# Patient Record
Sex: FEMALE | Race: WHITE | NOT HISPANIC OR LATINO | URBAN - METROPOLITAN AREA
[De-identification: names, ages, dates, MRNs, and addresses within clinical notes are randomized per-mention and may not be internally consistent; named-entity substitution may affect disease eponyms.]

---

## 2018-05-09 ENCOUNTER — INPATIENT (INPATIENT)
Facility: HOSPITAL | Age: 83
LOS: 2 days | Discharge: HOME | End: 2018-05-12
Attending: HOSPITALIST | Admitting: HOSPITALIST
Payer: MEDICARE

## 2018-05-09 VITALS — WEIGHT: 171.08 LBS | HEIGHT: 63 IN

## 2018-05-09 LAB
ADD ON TEST-SPECIMEN IN LAB: SIGNIFICANT CHANGE UP
ALBUMIN SERPL ELPH-MCNC: 3.6 G/DL — SIGNIFICANT CHANGE UP (ref 3.3–5)
ALP SERPL-CCNC: 65 U/L — SIGNIFICANT CHANGE UP (ref 40–120)
ALT FLD-CCNC: 24 U/L — SIGNIFICANT CHANGE UP (ref 12–78)
ANION GAP SERPL CALC-SCNC: 10 MMOL/L — SIGNIFICANT CHANGE UP (ref 5–17)
AST SERPL-CCNC: 19 U/L — SIGNIFICANT CHANGE UP (ref 15–37)
BASOPHILS # BLD AUTO: 0.08 K/UL — SIGNIFICANT CHANGE UP (ref 0–0.2)
BASOPHILS NFR BLD AUTO: 0.8 % — SIGNIFICANT CHANGE UP (ref 0–2)
BILIRUB SERPL-MCNC: 0.2 MG/DL — SIGNIFICANT CHANGE UP (ref 0.2–1.2)
BUN SERPL-MCNC: 19 MG/DL — SIGNIFICANT CHANGE UP (ref 7–23)
CALCIUM SERPL-MCNC: 9 MG/DL — SIGNIFICANT CHANGE UP (ref 8.5–10.1)
CHLORIDE SERPL-SCNC: 112 MMOL/L — HIGH (ref 96–108)
CO2 SERPL-SCNC: 23 MMOL/L — SIGNIFICANT CHANGE UP (ref 22–31)
CREAT SERPL-MCNC: 0.77 MG/DL — SIGNIFICANT CHANGE UP (ref 0.5–1.3)
EOSINOPHIL # BLD AUTO: 0.15 K/UL — SIGNIFICANT CHANGE UP (ref 0–0.5)
EOSINOPHIL NFR BLD AUTO: 1.6 % — SIGNIFICANT CHANGE UP (ref 0–6)
ERYTHROCYTE [SEDIMENTATION RATE] IN BLOOD: 11 MM/HR — SIGNIFICANT CHANGE UP (ref 0–20)
GLUCOSE SERPL-MCNC: 106 MG/DL — HIGH (ref 70–99)
HCT VFR BLD CALC: 37.2 % — SIGNIFICANT CHANGE UP (ref 34.5–45)
HGB BLD-MCNC: 12.6 G/DL — SIGNIFICANT CHANGE UP (ref 11.5–15.5)
IMM GRANULOCYTES NFR BLD AUTO: 0.2 % — SIGNIFICANT CHANGE UP (ref 0–1.5)
INR BLD: 1.03 RATIO — SIGNIFICANT CHANGE UP (ref 0.88–1.16)
LACTATE SERPL-SCNC: 1.5 MMOL/L — SIGNIFICANT CHANGE UP (ref 0.7–2)
LYMPHOCYTES # BLD AUTO: 2.69 K/UL — SIGNIFICANT CHANGE UP (ref 1–3.3)
LYMPHOCYTES # BLD AUTO: 28.2 % — SIGNIFICANT CHANGE UP (ref 13–44)
MCHC RBC-ENTMCNC: 31.8 PG — SIGNIFICANT CHANGE UP (ref 27–34)
MCHC RBC-ENTMCNC: 33.9 GM/DL — SIGNIFICANT CHANGE UP (ref 32–36)
MCV RBC AUTO: 93.9 FL — SIGNIFICANT CHANGE UP (ref 80–100)
MONOCYTES # BLD AUTO: 0.86 K/UL — SIGNIFICANT CHANGE UP (ref 0–0.9)
MONOCYTES NFR BLD AUTO: 9 % — SIGNIFICANT CHANGE UP (ref 2–14)
NEUTROPHILS # BLD AUTO: 5.75 K/UL — SIGNIFICANT CHANGE UP (ref 1.8–7.4)
NEUTROPHILS NFR BLD AUTO: 60.2 % — SIGNIFICANT CHANGE UP (ref 43–77)
NRBC # BLD: 0 /100 WBCS — SIGNIFICANT CHANGE UP (ref 0–0)
PLATELET # BLD AUTO: 177 K/UL — SIGNIFICANT CHANGE UP (ref 150–400)
POTASSIUM SERPL-MCNC: 3.7 MMOL/L — SIGNIFICANT CHANGE UP (ref 3.5–5.3)
POTASSIUM SERPL-SCNC: 3.7 MMOL/L — SIGNIFICANT CHANGE UP (ref 3.5–5.3)
PROT SERPL-MCNC: 7.1 GM/DL — SIGNIFICANT CHANGE UP (ref 6–8.3)
PROTHROM AB SERPL-ACNC: 11.1 SEC — SIGNIFICANT CHANGE UP (ref 9.8–12.7)
RBC # BLD: 3.96 M/UL — SIGNIFICANT CHANGE UP (ref 3.8–5.2)
RBC # FLD: 13.8 % — SIGNIFICANT CHANGE UP (ref 10.3–14.5)
SODIUM SERPL-SCNC: 145 MMOL/L — SIGNIFICANT CHANGE UP (ref 135–145)
WBC # BLD: 9.55 K/UL — SIGNIFICANT CHANGE UP (ref 3.8–10.5)
WBC # FLD AUTO: 9.55 K/UL — SIGNIFICANT CHANGE UP (ref 3.8–10.5)

## 2018-05-09 PROCEDURE — 99285 EMERGENCY DEPT VISIT HI MDM: CPT

## 2018-05-09 PROCEDURE — 73590 X-RAY EXAM OF LOWER LEG: CPT | Mod: 26,RT

## 2018-05-09 RX ORDER — SODIUM CHLORIDE 9 MG/ML
1000 INJECTION INTRAMUSCULAR; INTRAVENOUS; SUBCUTANEOUS
Qty: 0 | Refills: 0 | Status: DISCONTINUED | OUTPATIENT
Start: 2018-05-09 | End: 2018-05-12

## 2018-05-09 RX ORDER — ATORVASTATIN CALCIUM 80 MG/1
10 TABLET, FILM COATED ORAL AT BEDTIME
Qty: 0 | Refills: 0 | Status: DISCONTINUED | OUTPATIENT
Start: 2018-05-09 | End: 2018-05-12

## 2018-05-09 RX ORDER — CEFEPIME 1 G/1
INJECTION, POWDER, FOR SOLUTION INTRAMUSCULAR; INTRAVENOUS
Qty: 0 | Refills: 0 | Status: DISCONTINUED | OUTPATIENT
Start: 2018-05-09 | End: 2018-05-10

## 2018-05-09 RX ORDER — DOCUSATE SODIUM 100 MG
100 CAPSULE ORAL THREE TIMES A DAY
Qty: 0 | Refills: 0 | Status: DISCONTINUED | OUTPATIENT
Start: 2018-05-09 | End: 2018-05-12

## 2018-05-09 RX ORDER — TAMOXIFEN CITRATE 20 MG/1
20 TABLET, FILM COATED ORAL AT BEDTIME
Qty: 0 | Refills: 0 | Status: DISCONTINUED | OUTPATIENT
Start: 2018-05-09 | End: 2018-05-12

## 2018-05-09 RX ORDER — ACETAMINOPHEN 500 MG
650 TABLET ORAL EVERY 6 HOURS
Qty: 0 | Refills: 0 | Status: DISCONTINUED | OUTPATIENT
Start: 2018-05-09 | End: 2018-05-12

## 2018-05-09 RX ORDER — MULTIVIT-MIN/FERROUS GLUCONATE 9 MG/15 ML
1 LIQUID (ML) ORAL DAILY
Qty: 0 | Refills: 0 | Status: DISCONTINUED | OUTPATIENT
Start: 2018-05-09 | End: 2018-05-12

## 2018-05-09 RX ORDER — LOSARTAN POTASSIUM 100 MG/1
100 TABLET, FILM COATED ORAL DAILY
Qty: 0 | Refills: 0 | Status: DISCONTINUED | OUTPATIENT
Start: 2018-05-09 | End: 2018-05-12

## 2018-05-09 RX ORDER — ONDANSETRON 8 MG/1
4 TABLET, FILM COATED ORAL EVERY 6 HOURS
Qty: 0 | Refills: 0 | Status: DISCONTINUED | OUTPATIENT
Start: 2018-05-09 | End: 2018-05-12

## 2018-05-09 RX ORDER — CEFEPIME 1 G/1
1000 INJECTION, POWDER, FOR SOLUTION INTRAMUSCULAR; INTRAVENOUS ONCE
Qty: 0 | Refills: 0 | Status: COMPLETED | OUTPATIENT
Start: 2018-05-09 | End: 2018-05-09

## 2018-05-09 RX ORDER — CEFEPIME 1 G/1
1000 INJECTION, POWDER, FOR SOLUTION INTRAMUSCULAR; INTRAVENOUS EVERY 12 HOURS
Qty: 0 | Refills: 0 | Status: DISCONTINUED | OUTPATIENT
Start: 2018-05-10 | End: 2018-05-10

## 2018-05-09 RX ORDER — LEVOTHYROXINE SODIUM 125 MCG
112 TABLET ORAL DAILY
Qty: 0 | Refills: 0 | Status: DISCONTINUED | OUTPATIENT
Start: 2018-05-09 | End: 2018-05-12

## 2018-05-09 RX ORDER — CALCIUM CARBONATE 500(1250)
1 TABLET ORAL
Qty: 0 | Refills: 0 | Status: DISCONTINUED | OUTPATIENT
Start: 2018-05-09 | End: 2018-05-12

## 2018-05-09 RX ORDER — VANCOMYCIN HCL 1 G
1000 VIAL (EA) INTRAVENOUS ONCE
Qty: 0 | Refills: 0 | Status: COMPLETED | OUTPATIENT
Start: 2018-05-09 | End: 2018-05-10

## 2018-05-09 RX ORDER — ALPRAZOLAM 0.25 MG
0.25 TABLET ORAL DAILY
Qty: 0 | Refills: 0 | Status: DISCONTINUED | OUTPATIENT
Start: 2018-05-09 | End: 2018-05-12

## 2018-05-09 RX ADMIN — CEFEPIME 100 MILLIGRAM(S): 1 INJECTION, POWDER, FOR SOLUTION INTRAMUSCULAR; INTRAVENOUS at 22:41

## 2018-05-09 RX ADMIN — TAMOXIFEN CITRATE 20 MILLIGRAM(S): 20 TABLET, FILM COATED ORAL at 23:08

## 2018-05-09 RX ADMIN — ATORVASTATIN CALCIUM 10 MILLIGRAM(S): 80 TABLET, FILM COATED ORAL at 22:41

## 2018-05-09 NOTE — ED STATDOCS - OBJECTIVE STATEMENT
85 y/o female presents to the ED sent by Dr. Hebert for cellulitis of right ankle. Pt states it is painful when ambulating and resting. Pt prescribed outpatient antibiotics, augmentin, kelfex and cipro, with no improvement. Denies fever/chills. Had similar episode 3 years ago, admitted to hospital and received infusion.

## 2018-05-09 NOTE — ED STATDOCS - CARE PLAN
Principal Discharge DX:	Cellulitis of leg without foot, right  Goal:	admit  Assessment and plan of treatment:	cellulitis

## 2018-05-09 NOTE — H&P ADULT - ASSESSMENT
86 y.o. female with PMH Grave's disease s/p BARBER now hypothyroidism, HTN, HLD, Left invasive ductal breast ca s/p lumpectomy s/p chemo/xrt 2008, BCC skin presents with RLE redness, pain, and swelling. Pt reports symptoms for the past 3 weeks. Denies trauma and denies infect bites. Pt went to PCP and was given Keflex, Cipro and Augmentin at different times. Despite ABx, pt continues to complain of redness, pain, and swelling of RLE/shin region. She denies fever, chills, CP, SOB, abd pain, dysuria. Pt worse with ambulation and occurs even at rest.    #RLE cellulitis  #dehydration  -admit to med surg  -check ESR, CRP  -f/u blood cultures  -iv hydration  -vanco, cefepime  -outpt US: negative for DVT (report in chart)  -ID consult  -obtain xray RLE    #hypothyroidism  -cont synthroid 112mcg daily    #HTN, HLD  -cont home meds    #L breast ca s/p lumpectomy, chemo, xrt  -cont tamoxifen    #DVT ppx  -SCDs

## 2018-05-09 NOTE — H&P ADULT - NSHPPHYSICALEXAM_GEN_ALL_CORE
Vital Signs Last 24 Hrs  T(C): 36.8 (09 May 2018 16:33), Max: 36.8 (09 May 2018 16:33)  T(F): 98.2 (09 May 2018 16:33), Max: 98.2 (09 May 2018 16:33)  HR: 93 (09 May 2018 16:33) (93 - 93)  BP: 143/71 (09 May 2018 16:33) (143/71 - 143/71)  BP(mean): --  RR: --  SpO2: 95% (09 May 2018 16:33) (95% - 95%)    GEN: appears comfortable  Neuro: AAOx3, nonfocal  HEENT: NC/AT, EOMI  Neck: no thyroidmegaly, no JVD  Cardiovascular: S1S2 present, regular rhythm, no murmur  Respiratory: breath sounds normal bilaterally, no wheezing, no rales, no rhonchi  Gastrointestinal: bowel sounds normal, soft, no abdominal tenderness  Musculoskeletal: +tenderness RLE at shin  Extremities: R ankle pitting edema  Skin: +RLE erythema and swelling

## 2018-05-09 NOTE — ED STATDOCS - ATTENDING CONTRIBUTION TO CARE
I, Roxana Laguna MD,  performed the initial face to face bedside interview with this patient regarding history of present illness, review of symptoms and relevant past medical, social and family history.  I completed an independent physical examination.  I was the initial provider who evaluated this patient. I have signed out the follow up of any pending tests (i.e. labs, radiological studies) to the ACP.  I have communicated the patient’s plan of care and disposition with the ACP.  The history, relevant review of systems, past medical and surgical history, medical decision making, and physical examination was documented by the scribe in my presence and I attest to the accuracy of the documentation.

## 2018-05-09 NOTE — PATIENT PROFILE ADULT. - NS TRANSFER PATIENT BELONGINGS
yellow metal ring to left ring finger, wallet/Jewelry/Money (specify)/Clothing/Cell Phone/PDA (specify)

## 2018-05-09 NOTE — ED ADULT NURSE NOTE - OBJECTIVE STATEMENT
Pt alert and oriented x3. Pt presents with cellulitis of the RLE. Pt sent in for IV antibiotics. Pt states pain while ambulating. Denies fever or chills.

## 2018-05-09 NOTE — H&P ADULT - HISTORY OF PRESENT ILLNESS
86 y.o. female with PMH Grave's disease s/p BARBER now hypothyroidism, HTN, HLD, Left invasive ductal breast ca s/p lumpectomy s/p chemo/xrt 2008, BCC skin presents with RLE redness, pain, and swelling. Pt reports symptoms for the past 3 weeks. Denies trauma and denies infect bites. Pt went to PCP and was given Keflex, Cipro and Augmentin at different times. Despite ABx, pt continues to complain of redness, pain, and swelling of RLE/shin region. She denies fever, chills, CP, SOB, abd pain, dysuria. Pt worse with ambulation and occurs even at rest.    PMH: as above  PSH: as above and b/l cataract, tonsillectomy, lumpectomy, D&C  Social Hx: denies tobacco, EtOH, drugs  Family Hx: Mother-colon ca; daughter: breast ca; sister-colon ca  ROS: per HPI

## 2018-05-09 NOTE — ED ADULT TRIAGE NOTE - CHIEF COMPLAINT QUOTE
sent by dr mckinley for rle cellulitis patient failed outpatinet antibiotics, augmentin, kelfex and cipro.  denies fever/chills

## 2018-05-09 NOTE — ED STATDOCS - PROGRESS NOTE DETAILS
pt seen with ER attending for admission for cellulitis to left lower leg unresponsive to 3 dosages of oral antibiotics

## 2018-05-09 NOTE — ED STATDOCS - SKIN, MLM
skin normal color for race, warm, dry and intact. small patch of mildly reddened skin warm to touch medial distal R lower leg

## 2018-05-10 LAB
ANION GAP SERPL CALC-SCNC: 10 MMOL/L — SIGNIFICANT CHANGE UP (ref 5–17)
BASOPHILS # BLD AUTO: 0.07 K/UL — SIGNIFICANT CHANGE UP (ref 0–0.2)
BASOPHILS NFR BLD AUTO: 0.8 % — SIGNIFICANT CHANGE UP (ref 0–2)
BUN SERPL-MCNC: 15 MG/DL — SIGNIFICANT CHANGE UP (ref 7–23)
CALCIUM SERPL-MCNC: 8.3 MG/DL — LOW (ref 8.5–10.1)
CHLORIDE SERPL-SCNC: 113 MMOL/L — HIGH (ref 96–108)
CO2 SERPL-SCNC: 22 MMOL/L — SIGNIFICANT CHANGE UP (ref 22–31)
CREAT SERPL-MCNC: 0.66 MG/DL — SIGNIFICANT CHANGE UP (ref 0.5–1.3)
CRP SERPL-MCNC: 0.2 MG/DL — SIGNIFICANT CHANGE UP (ref 0–0.4)
EOSINOPHIL # BLD AUTO: 0.21 K/UL — SIGNIFICANT CHANGE UP (ref 0–0.5)
EOSINOPHIL NFR BLD AUTO: 2.4 % — SIGNIFICANT CHANGE UP (ref 0–6)
GLUCOSE SERPL-MCNC: 94 MG/DL — SIGNIFICANT CHANGE UP (ref 70–99)
HCT VFR BLD CALC: 34.1 % — LOW (ref 34.5–45)
HGB BLD-MCNC: 11.6 G/DL — SIGNIFICANT CHANGE UP (ref 11.5–15.5)
IMM GRANULOCYTES NFR BLD AUTO: 0.5 % — SIGNIFICANT CHANGE UP (ref 0–1.5)
LYMPHOCYTES # BLD AUTO: 2.69 K/UL — SIGNIFICANT CHANGE UP (ref 1–3.3)
LYMPHOCYTES # BLD AUTO: 30.9 % — SIGNIFICANT CHANGE UP (ref 13–44)
MCHC RBC-ENTMCNC: 31.8 PG — SIGNIFICANT CHANGE UP (ref 27–34)
MCHC RBC-ENTMCNC: 34 GM/DL — SIGNIFICANT CHANGE UP (ref 32–36)
MCV RBC AUTO: 93.4 FL — SIGNIFICANT CHANGE UP (ref 80–100)
MONOCYTES # BLD AUTO: 0.79 K/UL — SIGNIFICANT CHANGE UP (ref 0–0.9)
MONOCYTES NFR BLD AUTO: 9.1 % — SIGNIFICANT CHANGE UP (ref 2–14)
NEUTROPHILS # BLD AUTO: 4.9 K/UL — SIGNIFICANT CHANGE UP (ref 1.8–7.4)
NEUTROPHILS NFR BLD AUTO: 56.3 % — SIGNIFICANT CHANGE UP (ref 43–77)
NRBC # BLD: 0 /100 WBCS — SIGNIFICANT CHANGE UP (ref 0–0)
PLATELET # BLD AUTO: 149 K/UL — LOW (ref 150–400)
POTASSIUM SERPL-MCNC: 3.6 MMOL/L — SIGNIFICANT CHANGE UP (ref 3.5–5.3)
POTASSIUM SERPL-SCNC: 3.6 MMOL/L — SIGNIFICANT CHANGE UP (ref 3.5–5.3)
RBC # BLD: 3.65 M/UL — LOW (ref 3.8–5.2)
RBC # FLD: 13.7 % — SIGNIFICANT CHANGE UP (ref 10.3–14.5)
SODIUM SERPL-SCNC: 145 MMOL/L — SIGNIFICANT CHANGE UP (ref 135–145)
WBC # BLD: 8.7 K/UL — SIGNIFICANT CHANGE UP (ref 3.8–10.5)
WBC # FLD AUTO: 8.7 K/UL — SIGNIFICANT CHANGE UP (ref 3.8–10.5)

## 2018-05-10 RX ORDER — CEFAZOLIN SODIUM 1 G
1000 VIAL (EA) INJECTION EVERY 8 HOURS
Qty: 0 | Refills: 0 | Status: DISCONTINUED | OUTPATIENT
Start: 2018-05-10 | End: 2018-05-12

## 2018-05-10 RX ORDER — CEFEPIME 1 G/1
1000 INJECTION, POWDER, FOR SOLUTION INTRAMUSCULAR; INTRAVENOUS EVERY 12 HOURS
Qty: 0 | Refills: 0 | Status: DISCONTINUED | OUTPATIENT
Start: 2018-05-10 | End: 2018-05-10

## 2018-05-10 RX ADMIN — LOSARTAN POTASSIUM 100 MILLIGRAM(S): 100 TABLET, FILM COATED ORAL at 06:25

## 2018-05-10 RX ADMIN — CEFEPIME 1000 MILLIGRAM(S): 1 INJECTION, POWDER, FOR SOLUTION INTRAMUSCULAR; INTRAVENOUS at 06:25

## 2018-05-10 RX ADMIN — Medication 1 TABLET(S): at 17:16

## 2018-05-10 RX ADMIN — Medication 250 MILLIGRAM(S): at 00:27

## 2018-05-10 RX ADMIN — Medication 100 MILLIGRAM(S): at 21:02

## 2018-05-10 RX ADMIN — Medication 112 MICROGRAM(S): at 06:19

## 2018-05-10 RX ADMIN — Medication 1 TABLET(S): at 06:19

## 2018-05-10 RX ADMIN — Medication 1 TABLET(S): at 12:53

## 2018-05-10 RX ADMIN — TAMOXIFEN CITRATE 20 MILLIGRAM(S): 20 TABLET, FILM COATED ORAL at 21:02

## 2018-05-10 RX ADMIN — ATORVASTATIN CALCIUM 10 MILLIGRAM(S): 80 TABLET, FILM COATED ORAL at 21:02

## 2018-05-10 RX ADMIN — Medication 100 MILLIGRAM(S): at 15:41

## 2018-05-10 RX ADMIN — SODIUM CHLORIDE 75 MILLILITER(S): 9 INJECTION INTRAMUSCULAR; INTRAVENOUS; SUBCUTANEOUS at 00:28

## 2018-05-10 NOTE — PROGRESS NOTE ADULT - ATTENDING COMMENTS
Patient seen and examined with Alma Keys, Darryn Robertson, Rodolfo Saldivar, Kali Villa and PA Dawn Paez on the Family Medicine Teaching Service.  Agree with history, physical, labs and plan which were reviewed in detail.

## 2018-05-10 NOTE — PROGRESS NOTE ADULT - SUBJECTIVE AND OBJECTIVE BOX
86F w/PMH of L invasive ductal breast ca s/p lumpectomy s/p chemo/xrt 2008, HTN, HLD, hypothyroidism due to Grave's disease S/P BARBER presents w/ a 3 week hx of R ankle pain, swelling, and erythema. Pt denies any trauma, insect bites, scratching of the area. Pt was seen by PCP and diagnosed w/ cellulitis. Pt failed Keflex, Cipro and Augmentin as outpatient. Pt denies fever/chills, CP, SOB, abd pain, dysuria. In the ED, pt received Vanco and Cefepime.     5/10/18: Pt seen and evaluated at bedside. Pt reports the pain, swelling, erythema improved. ID started pt on Ancef 1g q8. Pt denies fevers/chills. Pt afebrile, WBC 8.7.    REVIEW OF SYSTEMS:  CONSTITUTIONAL: No weakness, fevers or chills  EYES/ENT: No visual changes;  No vertigo or throat pain   NECK: No pain or stiffness  RESPIRATORY: No cough, wheezing, hemoptysis; No shortness of breath  CARDIOVASCULAR: No chest pain or palpitations  GASTROINTESTINAL: No abdominal or epigastric pain. No nausea, vomiting, or hematemesis; No diarrhea or constipation. No melena or hematochezia.  GENITOURINARY: No dysuria, frequency or hematuria  NEUROLOGICAL: No numbness or weakness  SKIN:  R ankle pain, swelling, and erythema  All other review of systems is negative unless indicated above    PHYSICAL EXAM:  Vital Signs Last 24 Hrs  T(C): 37.3 (10 May 2018 12:00), Max: 37.3 (10 May 2018 12:00)  T(F): 99.2 (10 May 2018 12:00), Max: 99.2 (10 May 2018 12:00)  HR: 80 (10 May 2018 12:00) (79 - 93)  BP: 137/57 (10 May 2018 12:00) (137/57 - 169/81)  BP(mean): --  RR: 18 (10 May 2018 12:00) (18 - 19)  SpO2: 96% (10 May 2018 12:00) (95% - 100%)    Constitutional: Pt sitting in chair, awake and alert, NAD  HEENT: EOMI, normal hearing, moist mucous membranes  Neck: Soft and supple, no JVD  Respiratory: CTABL, No wheezing, rales or rhonchi  Cardiovascular: S1S2+, RRR, no M/G/R  Gastrointestinal: BS+, soft, NT/ND, no guarding, no rebound  Extremities: No peripheral edema  Vascular: 2+ peripheral pulses  Neurological: AAOx3, no focal deficits  Musculoskeletal: 5/5 strength b/l upper and lower extremities  Skin: R ankle pain, swelling, and erythema    MEDICATIONS:  MEDICATIONS  (STANDING):  atorvastatin 10 milliGRAM(s) Oral at bedtime  calcium carbonate 500 mG (Tums) Chewable 1 Tablet(s) Chew two times a day  ceFAZolin   IVPB 1000 milliGRAM(s) IV Intermittent every 8 hours  levothyroxine 112 MICROGram(s) Oral daily  losartan 100 milliGRAM(s) Oral daily  multivitamin/minerals 1 Tablet(s) Oral daily  sodium chloride 0.9%. 1000 milliLiter(s) (75 mL/Hr) IV Continuous <Continuous>  tamoxifen 20 milliGRAM(s) Oral at bedtime      LABS: All Labs Reviewed:                        11.6   8.70  )-----------( 149      ( 10 May 2018 07:16 )             34.1     05-10    145  |  113<H>  |  15  ----------------------------<  94  3.6   |  22  |  0.66    Ca    8.3<L>      10 May 2018 07:16    TPro  7.1  /  Alb  3.6  /  TBili  0.2  /  DBili  x   /  AST  19  /  ALT  24  /  AlkPhos  65  05-09    PT/INR - ( 09 May 2018 17:52 )   PT: 11.1 sec;   INR: 1.03 ratio         Blood Culture:   I&O's Summary    10 May 2018 07:01  -  10 May 2018 14:28  --------------------------------------------------------  IN: 360 mL / OUT: 0 mL / NET: 360 mL

## 2018-05-10 NOTE — PROGRESS NOTE ADULT - ASSESSMENT
86F w/PMH of L invasive ductal breast ca s/p lumpectomy s/p chemo/xrt 2008, HTN, HLD, hypothyroidism due to Grave's disease S/P BARBER presents w/ a 3 week hx of R ankle pain, swelling, and erythema 2/2 cellulitis that failed outpatient Keflex, Cipro and Augmentin therapy.     #RLE cellulitis  - F/U blood cultures  - IV hydration 75mL/hr x24 hours  - ESR, CRP WNL  - S/P vanco, cefepime  - ID consult appreciated: changed to Ancef 1g q8  - Foot xray negative for osteomyelitis    #Hypothyroidism  - Continue synthroid 112mcg qd    #HTN  - Continue home meds losartan    #HLD  - Continue atorvastatin    #Hx of breast cancer  - Stable  - Continue Tamoxifen    #DVT ppx  - SCDs

## 2018-05-10 NOTE — CONSULT NOTE ADULT - SUBJECTIVE AND OBJECTIVE BOX
Patient is a 86y old  Female who presents with a chief complaint of RLE cellulitis (09 May 2018 20:54)    HPI:  86 y.o. female with PMH Grave's disease s/p BARBER now hypothyroidism, HTN, HLD, Left invasive ductal breast ca s/p lumpectomy s/p chemo/xrt , BCC skin presents with RLE redness, pain, and swelling on . Pt reports symptoms for the past 3 weeks. Denies trauma and denies infect bites. Pt went to PCP and was given Keflex, Cipro and Augmentin at different times. Despite ABx, pt continues to complain of redness, pain, and swelling of RLE/shin region. She denies fever, chills, CP, SOB, abd pain, dysuria. Pt worse with ambulation and occurs even at rest. Here, afebrile, wbc ct nl, was given IV vanco/cefepime.     PMH: as above  PSH: as above and b/l cataract, tonsillectomy, lumpectomy, D&C    Meds: per reconciliation sheet, noted below  MEDICATIONS  (STANDING):  atorvastatin 10 milliGRAM(s) Oral at bedtime  calcium carbonate 500 mG (Tums) Chewable 1 Tablet(s) Chew two times a day  ceFAZolin   IVPB 1000 milliGRAM(s) IV Intermittent every 8 hours  levothyroxine 112 MICROGram(s) Oral daily  losartan 100 milliGRAM(s) Oral daily  multivitamin/minerals 1 Tablet(s) Oral daily  sodium chloride 0.9%. 1000 milliLiter(s) (75 mL/Hr) IV Continuous <Continuous>  tamoxifen 20 milliGRAM(s) Oral at bedtime      Allergies    No Known Allergies    Intolerances      Social: no smoking, no alcohol, no illegal drugs; no recent travel, no exposure to TB  FAMILY HISTORY:     no history of premature cardiovascular disease in first degree relatives    ROS: the patient denies fever, no chills, no HA, no dizziness, no sore throat, no blurry vision, no CP, no palpitations, no SOB, no cough, no abdominal pain, no diarrhea, no N/V, no dysuria, no leg pain, no claudication, no rectal pain or bleeding, no night sweats  All other systems reviewed and are negative    Vital Signs Last 24 Hrs  T(C): 37.3 (10 May 2018 12:00), Max: 37.3 (10 May 2018 12:00)  T(F): 99.2 (10 May 2018 12:00), Max: 99.2 (10 May 2018 12:00)  HR: 80 (10 May 2018 12:00) (79 - 93)  BP: 137/57 (10 May 2018 12:00) (137/57 - 169/81)  BP(mean): --  RR: 18 (10 May 2018 12:00) (18 - 19)  SpO2: 96% (10 May 2018 12:00) (95% - 100%)  Daily Height in cm: 160.02 (09 May 2018 16:15)    Daily Weight in k (10 May 2018 00:13)    PE:    Constitutional: frail looking  HEENT: NC/AT, EOMI, PERRLA, conjunctivae clear; ears and nose atraumatic; pharynx benign  Neck: supple; thyroid not palpable  Back: no tenderness  Respiratory: respiratory effort normal; clear to auscultation  Cardiovascular: S1S2 regular, no murmurs  Abdomen: soft, not tender, not distended, positive BS; liver and spleen WNL  Genitourinary: no suprapubic tenderness  Lymphatic: no LN palpable  Musculoskeletal: no muscle tenderness, no joint swelling or tenderness  Extremities: no pedal edema  Neurological/ Psychiatric: moving all extremities  Skin: RLE resolving erythema    Labs: all available labs reviewed                        11.6   8.70  )-----------( 149      ( 10 May 2018 07:16 )             34.1     05-10    145  |  113<H>  |  15  ----------------------------<  94  3.6   |  22  |  0.66    Ca    8.3<L>      10 May 2018 07:16    TPro  7.1  /  Alb  3.6  /  TBili  0.2  /  DBili  x   /  AST  19  /  ALT  24  /  AlkPhos  65  05-09     LIVER FUNCTIONS - ( 09 May 2018 17:52 )  Alb: 3.6 g/dL / Pro: 7.1 gm/dL / ALK PHOS: 65 U/L / ALT: 24 U/L / AST: 19 U/L / GGT: x                 Radiology: all available radiological tests reviewed      EXAM:  CR TIB-FIB RIGHT                            PROCEDURE DATE:  2018          INTERPRETATION:  INDICATION: 86-year-old pain. Swelling and tenderness in   the right leg.    3 views of right tibia and fibula. There are no prior exams available for   comparison.    FINDINGS/  IMPRESSION:    There is no acute displaced fracture or dislocation. There is osteopenia.   There is no gross soft tissue swelling.     If symptoms persist, follow-up exam is suggested.    < end of copied text >    Advanced directives addressed: full resuscitation

## 2018-05-10 NOTE — CONSULT NOTE ADULT - ASSESSMENT
86 y.o. female with PMH Grave's disease s/p BARBER now hypothyroidism, HTN, HLD, Left invasive ductal breast ca s/p lumpectomy s/p chemo/xrt 2008, BCC skin presents with RLE redness, pain, and swelling on 5/9. Pt reports symptoms for the past 3 weeks. Denies trauma and denies infect bites. Pt went to PCP and was given Keflex, Cipro and Augmentin at different times. Despite ABx, pt continues to complain of redness, pain, and swelling of RLE/shin region. She denies fever, chills, CP, SOB, abd pain, dysuria. Pt worse with ambulation and occurs even at rest. Here, afebrile, wbc ct nl, was given IV vanco/cefepime.     1. RLE cellulitis  - improving  - likely strep related  - s/p vanco/cefepime  - switch to ancef 1gmq8h  - once improved in next 24-48 hours, switch to oral keflex [id number] to complete 5 day course  - f/u cbc  - monitor temps  - tolerating abx well so far; no side effects noted  -reason for abx use and side effects reviewed with patient  - supportive care    2. other issues - care per medicine

## 2018-05-11 ENCOUNTER — TRANSCRIPTION ENCOUNTER (OUTPATIENT)
Age: 83
End: 2018-05-11

## 2018-05-11 RX ORDER — CEPHALEXIN 500 MG
1 CAPSULE ORAL
Qty: 21 | Refills: 0 | OUTPATIENT
Start: 2018-05-11 | End: 2018-05-17

## 2018-05-11 RX ORDER — CEPHALEXIN 500 MG
1 CAPSULE ORAL
Qty: 15 | Refills: 0 | OUTPATIENT
Start: 2018-05-11 | End: 2018-05-15

## 2018-05-11 RX ORDER — CEPHALEXIN 500 MG
1 CAPSULE ORAL
Qty: 9 | Refills: 0 | OUTPATIENT
Start: 2018-05-11 | End: 2018-05-13

## 2018-05-11 RX ADMIN — LOSARTAN POTASSIUM 100 MILLIGRAM(S): 100 TABLET, FILM COATED ORAL at 05:19

## 2018-05-11 RX ADMIN — Medication 100 MILLIGRAM(S): at 21:29

## 2018-05-11 RX ADMIN — Medication 1 TABLET(S): at 05:19

## 2018-05-11 RX ADMIN — ATORVASTATIN CALCIUM 10 MILLIGRAM(S): 80 TABLET, FILM COATED ORAL at 21:29

## 2018-05-11 RX ADMIN — Medication 100 MILLIGRAM(S): at 13:18

## 2018-05-11 RX ADMIN — Medication 1 TABLET(S): at 12:06

## 2018-05-11 RX ADMIN — Medication 1 TABLET(S): at 17:35

## 2018-05-11 RX ADMIN — Medication 112 MICROGRAM(S): at 05:19

## 2018-05-11 RX ADMIN — Medication 100 MILLIGRAM(S): at 05:19

## 2018-05-11 RX ADMIN — TAMOXIFEN CITRATE 20 MILLIGRAM(S): 20 TABLET, FILM COATED ORAL at 21:29

## 2018-05-11 NOTE — DISCHARGE NOTE ADULT - CARE PLAN
Principal Discharge DX:	Cellulitis of leg without foot, right  Goal:	To not have worsening swelling, pain, warmth of right ankle Principal Discharge DX:	Cellulitis of leg without foot, right  Goal:	To not have worsening swelling, pain, warmth of right ankle  Assessment and plan of treatment:	- Start taking Keflex 500mg every 8 hours for the next three days to complete your antibiotic course  - Follow up with your primary physician within the next week  - Call your primary physician or return to the ED if you experience worsening right ankle/foot pain, fevers, chills, nausea or vomiting Principal Discharge DX:	Cellulitis of leg without foot, right  Goal:	To not have worsening swelling, pain, warmth of right ankle  Assessment and plan of treatment:	- Start taking Keflex 500mg every 8 hours for the next five days to complete your antibiotic course  - Follow up with your primary physician within the next week  - Call your primary physician or return to the ED if you experience worsening right ankle/foot pain, fevers, chills, nausea or vomiting  Secondary Diagnosis:	Hypothyroid  Assessment and plan of treatment:	- Continue taking synthroid 112mcg daily  Secondary Diagnosis:	HTN (hypertension)  Assessment and plan of treatment:	- Continue taking home Losartan  Secondary Diagnosis:	HLD (hyperlipidemia)  Assessment and plan of treatment:	- Continue taking atorvastatin  Secondary Diagnosis:	History of breast cancer  Assessment and plan of treatment:	- Continue taking Tamoxifen Principal Discharge DX:	Cellulitis of leg without foot, right  Goal:	To not have worsening swelling, pain, warmth of right ankle  Assessment and plan of treatment:	- Start taking Keflex 500mg every 8 hours for the next seven days to complete your antibiotic course  - Follow up with your primary physician within the next week  - Call your primary physician or return to the ED if you experience worsening right ankle/foot pain, fevers, chills, nausea or vomiting  Secondary Diagnosis:	Hypothyroid  Assessment and plan of treatment:	- Continue taking synthroid 112mcg daily  Secondary Diagnosis:	HTN (hypertension)  Assessment and plan of treatment:	- Continue taking home Losartan  Secondary Diagnosis:	HLD (hyperlipidemia)  Assessment and plan of treatment:	- Continue taking atorvastatin  Secondary Diagnosis:	History of breast cancer  Assessment and plan of treatment:	- Continue taking Tamoxifen

## 2018-05-11 NOTE — DISCHARGE NOTE ADULT - CARE PROVIDER_API CALL
Mahnaz Hebert), Gastroenterology; Internal Medicine  [address]  [place] [address]  Phone: [phone]  Fax: [phone]    Nazia Page (PA-C), Physician Assistant Services  [address]  [place] [address]  Phone: [phone]  Fax: [phone]

## 2018-05-11 NOTE — DISCHARGE NOTE ADULT - HOSPITAL COURSE
86F w/PMH of L invasive ductal breast ca s/p lumpectomy s/p chemo/xrt 2008, HTN, HLD, hypothyroidism due to Grave's disease S/P BARBER presents w/ a 3 week hx of R ankle pain, swelling, and erythema. Pt denies any trauma, insect bites, scratching of the area. Pt was seen by PCP and diagnosed w/ cellulitis. Pt failed Keflex, Cipro and Augmentin as outpatient. Pt denies fever/chills, CP, SOB, abd pain, dysuria. In the ED, pt received Vanco and Cefepime. ID started pt on Ancef 1g q8. Pain, swelling, erythema improved. Pt denies fevers/chills. Pt afebrile, WBC 8.7 on D/C. Pt sent home w/ 3 days of Keflex 500mg q8 to complete 5 day antibiotic course. 86F w/PMH of L invasive ductal breast ca s/p lumpectomy s/p chemo/xrt 2008, HTN, HLD, hypothyroidism due to Grave's disease S/P BARBER presents w/ a 3 week hx of R ankle pain, swelling, and erythema. Pt denies any trauma, insect bites, scratching of the area. Pt was seen by PCP and diagnosed w/ cellulitis. Pt failed Keflex, Cipro and Augmentin as outpatient. Pt denies fever/chills, CP, SOB, abd pain, dysuria. In the ED, pt received Vanco and Cefepime. ID started pt on Ancef 1g q8. Pain, swelling, erythema improved. Pt denies fevers/chills. Pt afebrile, WBC WNL on D/C. Pt sent home w/ 5 days of Keflex 500mg q8 to complete antibiotic course. 86F w/PMH of L invasive ductal breast ca s/p lumpectomy s/p chemo/xrt 2008, HTN, HLD, hypothyroidism due to Grave's disease S/P BARBER presents w/ a 3 week hx of R ankle pain, swelling, and erythema. Pt denies any trauma, insect bites, scratching of the area. Pt was seen by PCP and diagnosed w/ cellulitis. Pt failed Keflex, Cipro and Augmentin as outpatient. Pt denies fever/chills, CP, SOB, abd pain, dysuria. In the ED, pt received Vanco and Cefepime. ID started pt on Ancef 1g q8. Pain, swelling, erythema improved. Pt denies fevers/chills. Pt afebrile, WBC WNL on D/C. Pt sent home w/ 5 days of Keflex 500mg q8 to complete antibiotic course.    PE  Vital Signs Last 24 Hrs  T(C): 36.8 (11 May 2018 05:22), Max: 36.8 (11 May 2018 05:22)  T(F): 98.2 (11 May 2018 05:22), Max: 98.2 (11 May 2018 05:22)  HR: 74 (11 May 2018 05:22) (74 - 81)  BP: 135/67 (11 May 2018 05:22) (129/78 - 135/67)  BP(mean): --  RR: 18 (11 May 2018 05:22) (18 - 18)  SpO2: 94% (11 May 2018 05:22) (94% - 98%)    Constitutional: Pt sitting in chair, awake and alert, NAD  HEENT: EOMI, normal hearing, moist mucous membranes  Neck: Soft and supple, no JVD  Respiratory: CTABL, No wheezing, rales or rhonchi  Cardiovascular: S1S2+, RRR, no M/G/R  Gastrointestinal: BS+, soft, NT/ND, no guarding, no rebound  Extremities: No peripheral edema  Vascular: 2+ peripheral pulses  Neurological: AAOx3, no focal deficits  Musculoskeletal: 5/5 strength b/l upper and lower extremities  Skin: R ankle pain, swelling, and erythema

## 2018-05-11 NOTE — DISCHARGE NOTE ADULT - OTHER SIGNIFICANT FINDINGS
11.6   8.70  )-----------( 149      ( 10 May 2018 07:16 )             34.1     10 May 2018 07:16    145    |  113    |  15     ----------------------------<  94     3.6     |  22     |  0.66     Ca    8.3        10 May 2018 07:16    TPro  7.1    /  Alb  3.6    /  TBili  0.2    /  DBili  x      /  AST  19     /  ALT  24     /  AlkPhos  65     09 May 2018 17:52    PT/INR - ( 09 May 2018 17:52 )   PT: 11.1 sec;   INR: 1.03 ratio       CAPILLARY BLOOD GLUCOSE    LIVER FUNCTIONS - ( 09 May 2018 17:52 )  Alb: 3.6 g/dL / Pro: 7.1 gm/dL / ALK PHOS: 65 U/L / ALT: 24 U/L / AST: 19 U/L / GGT: x

## 2018-05-11 NOTE — DISCHARGE NOTE ADULT - PLAN OF CARE
To not have worsening swelling, pain, warmth of right ankle - Start taking Keflex 500mg every 8 hours for the next three days to complete your antibiotic course  - Follow up with your primary physician within the next week  - Call your primary physician or return to the ED if you experience worsening right ankle/foot pain, fevers, chills, nausea or vomiting - Start taking Keflex 500mg every 8 hours for the next five days to complete your antibiotic course  - Follow up with your primary physician within the next week  - Call your primary physician or return to the ED if you experience worsening right ankle/foot pain, fevers, chills, nausea or vomiting - Continue taking synthroid 112mcg daily - Continue taking home Losartan - Continue taking atorvastatin - Continue taking Tamoxifen - Start taking Keflex 500mg every 8 hours for the next seven days to complete your antibiotic course  - Follow up with your primary physician within the next week  - Call your primary physician or return to the ED if you experience worsening right ankle/foot pain, fevers, chills, nausea or vomiting

## 2018-05-11 NOTE — DISCHARGE NOTE ADULT - PATIENT PORTAL LINK FT
You can access the [facility] Patient Portal, offered by [facility], by registering with the following website: [email or web address]

## 2018-05-11 NOTE — DISCHARGE NOTE ADULT - MEDICATION SUMMARY - MEDICATIONS TO TAKE
I will START or STAY ON the medications listed below when I get home from the hospital:    Cozaar 100 mg oral tablet  -- 1 tab(s) by mouth once a day  -- Indication: For HTN    calcium carbonate 500 mg (200 mg elemental calcium) oral tablet, chewable  -- 1 tab(s) by mouth 2 times a day  -- Indication: For GERD    atorvastatin 10 mg oral tablet  -- 1 tab(s) by mouth once a day (at bedtime)  -- Indication: For HLD    tamoxifen 20 mg oral tablet  -- 1 tab(s) by mouth once a day (at bedtime)  -- Indication: For Hx of breast cancer    ALPRAZolam 0.25 mg oral tablet  -- 1 tab(s) by mouth once a day, As Needed  -- Indication: For Anxiety    Keflex 500 mg oral capsule  -- 1 cap(s) by mouth every 8 hours   -- Finish all this medication unless otherwise directed by prescriber.    -- Indication: For CELLULITIS OF LEG WITHOUT FOOT, RIGHT    Synthroid 112 mcg (0.112 mg) oral tablet  -- 1 tab(s) by mouth once a day  -- Indication: For Hypothyroidism    Centrum Silver oral tablet  -- 1 tab(s) by mouth once a day  -- Indication: For Health maintenance

## 2018-05-11 NOTE — PROGRESS NOTE ADULT - ASSESSMENT
86 y.o. female with PMH Grave's disease s/p BARBER now hypothyroidism, HTN, HLD, Left invasive ductal breast ca s/p lumpectomy s/p chemo/xrt 2008, BCC skin presents with RLE redness, pain, and swelling on 5/9. Pt reports symptoms for the past 3 weeks. Denies trauma and denies insect bites. Pt went to PCP and was given Keflex, Cipro and Augmentin at different times. Despite ABx, pt continues to complain of redness, pain, and swelling of RLE/shin region. She denies fever, chills, CP, SOB, abd pain, dysuria. Pt worse with ambulation and occurs even at rest. Here, afebrile, wbc ct nl, was given IV vanco/cefepime.     1. RLE cellulitis  - improving  - likely strep related  - s/p vanco/cefepime  - on ancef 1gmq8h #2  - continue with antibiotic coverage  - consider RLE doppler  - once improved in next 24-48 hours, switch to oral keflex [id number] to complete 7 day course  - f/u cbc  - monitor temps  - tolerating abx well so far; no side effects noted  -reason for abx use and side effects reviewed with patient  - supportive care    2. other issues - care per medicine

## 2018-05-11 NOTE — DISCHARGE NOTE ADULT - ADDITIONAL INSTRUCTIONS
- Followup with primary MD.  - Take medications as prescribed.  - If you have a fever greater than 100.3F, chills, abdominal pain, headache with vision changes, or shortness of breath, come to the ED or call your primary MD immediately.

## 2018-05-11 NOTE — PROGRESS NOTE ADULT - SUBJECTIVE AND OBJECTIVE BOX
Date of service: 05-11-18 @ 15:55    86 y.o. female with PMH Grave's disease s/p BARBER now hypothyroidism, HTN, HLD, Left invasive ductal breast ca s/p lumpectomy s/p chemo/xrt 2008, BCC skin presents with RLE redness, pain, and swelling on 5/9. Pt reports symptoms for the past 3 weeks. Denies trauma and denies infect bites. Pt went to PCP and was given Keflex, Cipro and Augmentin at different times. Despite ABx, pt continues to complain of redness, pain, and swelling of RLE/shin region. She denies fever, chills, CP, SOB, abd pain, dysuria. Pt worse with ambulation and occurs even at rest. Here, afebrile, wbc ct nl, was given IV vanco/cefepime.     pt seen and examined  no fevers  LLE improving    ROS: no fever or chills; denies dizziness, no HA, no SOB or cough, no abdominal pain, no diarrhea or constipation; no dysuria, no urinary frequency    MEDICATIONS  (STANDING):  atorvastatin 10 milliGRAM(s) Oral at bedtime  calcium carbonate 500 mG (Tums) Chewable 1 Tablet(s) Chew two times a day  ceFAZolin   IVPB 1000 milliGRAM(s) IV Intermittent every 8 hours  levothyroxine 112 MICROGram(s) Oral daily  losartan 100 milliGRAM(s) Oral daily  multivitamin/minerals 1 Tablet(s) Oral daily  sodium chloride 0.9%. 1000 milliLiter(s) (75 mL/Hr) IV Continuous <Continuous>  tamoxifen 20 milliGRAM(s) Oral at bedtime    Vital Signs Last 24 Hrs  T(C): 36.8 (11 May 2018 05:22), Max: 36.8 (11 May 2018 05:22)  T(F): 98.2 (11 May 2018 05:22), Max: 98.2 (11 May 2018 05:22)  HR: 74 (11 May 2018 05:22) (74 - 81)  BP: 135/67 (11 May 2018 05:22) (129/78 - 135/67)  BP(mean): --  RR: 18 (11 May 2018 05:22) (18 - 18)  SpO2: 94% (11 May 2018 05:22) (94% - 98%)        PE:  Constitutional: frail looking  HEENT: NC/AT, EOMI, PERRLA, conjunctivae clear; ears and nose atraumatic; pharynx benign  Neck: supple; thyroid not palpable  Back: no tenderness  Respiratory: respiratory effort normal; clear to auscultation  Cardiovascular: S1S2 regular, no murmurs  Abdomen: soft, not tender, not distended, positive BS; liver and spleen WNL  Genitourinary: no suprapubic tenderness  Lymphatic: no LN palpable  Musculoskeletal: no muscle tenderness, no joint swelling or tenderness  Extremities: no pedal edema  Neurological/ Psychiatric: moving all extremities  Skin: RLE resolving erythema    Labs: all available labs reviewed                                   11.6   8.70  )-----------( 149      ( 10 May 2018 07:16 )             34.1     05-10    145  |  113<H>  |  15  ----------------------------<  94  3.6   |  22  |  0.66    Ca    8.3<L>      10 May 2018 07:16    TPro  7.1  /  Alb  3.6  /  TBili  0.2  /  DBili  x   /  AST  19  /  ALT  24  /  AlkPhos  65  05-09         Cultures:   Culture - Blood (05.09.18 @ 17:52)    Specimen Source: .Blood Blood    Culture Results:   No growth to date.    Culture - Blood (05.09.18 @ 17:52)    Specimen Source: .Blood Blood    Culture Results:   No growth to date.      Radiology: all available radiological tests reviewed      EXAM:  CR TIB-FIB RIGHT                            PROCEDURE DATE:  05/09/2018          INTERPRETATION:  INDICATION: 86-year-old pain. Swelling and tenderness in   the right leg.    3 views of right tibia and fibula. There are no prior exams available for   comparison.    FINDINGS/  IMPRESSION:    There is no acute displaced fracture or dislocation. There is osteopenia.   There is no gross soft tissue swelling.     If symptoms persist, follow-up exam is suggested.    < end of copied text >    Advanced directives addressed: full resuscitation

## 2018-05-12 VITALS
DIASTOLIC BLOOD PRESSURE: 77 MMHG | TEMPERATURE: 98 F | OXYGEN SATURATION: 97 % | HEART RATE: 84 BPM | SYSTOLIC BLOOD PRESSURE: 137 MMHG | RESPIRATION RATE: 18 BRPM

## 2018-05-12 LAB
HCT VFR BLD CALC: 34.4 % — LOW (ref 34.5–45)
HGB BLD-MCNC: 11.5 G/DL — SIGNIFICANT CHANGE UP (ref 11.5–15.5)
MCHC RBC-ENTMCNC: 31.3 PG — SIGNIFICANT CHANGE UP (ref 27–34)
MCHC RBC-ENTMCNC: 33.4 GM/DL — SIGNIFICANT CHANGE UP (ref 32–36)
MCV RBC AUTO: 93.5 FL — SIGNIFICANT CHANGE UP (ref 80–100)
NRBC # BLD: 0 /100 WBCS — SIGNIFICANT CHANGE UP (ref 0–0)
PLATELET # BLD AUTO: 159 K/UL — SIGNIFICANT CHANGE UP (ref 150–400)
RBC # BLD: 3.68 M/UL — LOW (ref 3.8–5.2)
RBC # FLD: 13.8 % — SIGNIFICANT CHANGE UP (ref 10.3–14.5)
WBC # BLD: 9.25 K/UL — SIGNIFICANT CHANGE UP (ref 3.8–10.5)
WBC # FLD AUTO: 9.25 K/UL — SIGNIFICANT CHANGE UP (ref 3.8–10.5)

## 2018-05-12 RX ORDER — CEPHALEXIN 500 MG
1 CAPSULE ORAL
Qty: 21 | Refills: 0
Start: 2018-05-12 | End: 2018-05-18

## 2018-05-12 RX ADMIN — Medication 112 MICROGRAM(S): at 05:36

## 2018-05-12 RX ADMIN — Medication 100 MILLIGRAM(S): at 05:36

## 2018-05-12 RX ADMIN — Medication 1 TABLET(S): at 05:36

## 2018-05-12 RX ADMIN — LOSARTAN POTASSIUM 100 MILLIGRAM(S): 100 TABLET, FILM COATED ORAL at 05:36

## 2018-05-12 NOTE — PROGRESS NOTE ADULT - ASSESSMENT
86F w/PMH of L invasive ductal breast ca s/p lumpectomy s/p chemo/xrt 2008, HTN, HLD, hypothyroidism due to Grave's disease S/P BARBER presents w/ a 3 week hx of R ankle pain, swelling, and erythema 2/2 cellulitis. D/C order placed yesterday, pt aware.    #RLE cellulitis  - Blood cultures negative  - ESR, CRP WNL  - S/P vanco, cefepime, Ancef  - ID consult appreciated: D/C on Keflex 500mg q8 x7days  - Foot xray negative for osteomyelitis    #Hypothyroidism  - Continue synthroid 112mcg qd    #HTN  - Continue home meds losartan    #HLD  - Continue atorvastatin    #Hx of breast cancer  - Stable  - Continue Tamoxifen    #DVT ppx  - SCDs

## 2018-05-12 NOTE — PROGRESS NOTE ADULT - SUBJECTIVE AND OBJECTIVE BOX
6F w/PMH of L invasive ductal breast ca s/p lumpectomy s/p chemo/xrt 2008, HTN, HLD, hypothyroidism due to Grave's disease S/P BARBER presents w/ a 3 week hx of R ankle pain, swelling, and erythema. Pt denies any trauma, insect bites, scratching of the area. Pt was seen by PCP and diagnosed w/ cellulitis. Pt failed Keflex, Cipro and Augmentin as outpatient. Pt denies fever/chills, CP, SOB, abd pain, dysuria. In the ED, pt received Vanco and Cefepime.     5/10/18: Pt seen and evaluated at bedside. Pt reports the pain, swelling, erythema improved. ID started pt on Ancef 1g q8. Pt denies fevers/chills. Pt afebrile, WBC 8.7.  5/12/18: Pt seen and evaluated at bedside. Pt still concerned about remaining pain, swelling, erythema, was reassured about taking outpt PO antibiotics. Pt remains afebrile, WBC 9.25. D/C on Keflex 500mg q8 x7days.    REVIEW OF SYSTEMS:  CONSTITUTIONAL: No weakness, fevers or chills  EYES/ENT: No visual changes;  No vertigo or throat pain   NECK: No pain or stiffness  RESPIRATORY: No cough, wheezing, hemoptysis; No shortness of breath  CARDIOVASCULAR: No chest pain or palpitations  GASTROINTESTINAL: No abdominal or epigastric pain. No nausea, vomiting, or hematemesis; No diarrhea or constipation. No melena or hematochezia.  GENITOURINARY: No dysuria, frequency or hematuria  NEUROLOGICAL: No numbness or weakness  SKIN:  POSITIVE R ankle pain, swelling, and erythema, improved  All other review of systems is negative unless indicated above    PHYSICAL EXAM:  Vital Signs Last 24 Hrs  T(C): 36.9 (12 May 2018 05:08), Max: 36.9 (11 May 2018 17:18)  T(F): 98.5 (12 May 2018 05:08), Max: 98.5 (12 May 2018 05:08)  HR: 84 (12 May 2018 05:08) (82 - 84)  BP: 137/77 (12 May 2018 05:08) (131/46 - 137/77)  BP(mean): --  RR: 18 (12 May 2018 05:08) (18 - 18)  SpO2: 97% (12 May 2018 05:08) (94% - 97%)    Constitutional: Pt sitting in chair, awake and alert, NAD  HEENT: EOMI, normal hearing, moist mucous membranes  Neck: Soft and supple, no JVD  Respiratory: CTABL, No wheezing, rales or rhonchi  Cardiovascular: S1S2+, RRR, no M/G/R  Gastrointestinal: BS+, soft, NT/ND, no guarding, no rebound  Extremities: No peripheral edema  Vascular: 2+ peripheral pulses  Neurological: AAOx3, no focal deficits  Musculoskeletal: 5/5 strength b/l upper and lower extremities  Skin: R ankle pain, swelling, and erythema, margins retreated from initial presentation    MEDICATIONS  (STANDING):  atorvastatin 10 milliGRAM(s) Oral at bedtime  calcium carbonate 500 mG (Tums) Chewable 1 Tablet(s) Chew two times a day  ceFAZolin   IVPB 1000 milliGRAM(s) IV Intermittent every 8 hours  levothyroxine 112 MICROGram(s) Oral daily  losartan 100 milliGRAM(s) Oral daily  multivitamin/minerals 1 Tablet(s) Oral daily  sodium chloride 0.9%. 1000 milliLiter(s) (75 mL/Hr) IV Continuous <Continuous>  tamoxifen 20 milliGRAM(s) Oral at bedtime    MEDICATIONS  (PRN):  acetaminophen   Tablet 650 milliGRAM(s) Oral every 6 hours PRN For Temp greater than 38 C (100.4 F), mild pain, HA  ALPRAZolam 0.25 milliGRAM(s) Oral daily PRN anxiety  docusate sodium 100 milliGRAM(s) Oral three times a day PRN Constipation  ondansetron Injectable 4 milliGRAM(s) IV Push every 6 hours PRN Nausea      LABS: All Labs Reviewed:                        11.5   9.25  )-----------( 159      ( 12 May 2018 07:03 )             34.4

## 2018-05-12 NOTE — PROGRESS NOTE ADULT - ATTENDING COMMENTS
Patient seen and examined with Alma Robertson and Rodolfo Saldivar on the Family Medicine Teaching Service.  Agree with history, physical, labs and plan which were reviewed in detail.

## 2018-05-15 DIAGNOSIS — I10 ESSENTIAL (PRIMARY) HYPERTENSION: ICD-10-CM

## 2018-05-15 DIAGNOSIS — Z92.21 PERSONAL HISTORY OF ANTINEOPLASTIC CHEMOTHERAPY: ICD-10-CM

## 2018-05-15 DIAGNOSIS — L03.115 CELLULITIS OF RIGHT LOWER LIMB: ICD-10-CM

## 2018-05-15 DIAGNOSIS — E78.5 HYPERLIPIDEMIA, UNSPECIFIED: ICD-10-CM

## 2018-05-15 DIAGNOSIS — E86.0 DEHYDRATION: ICD-10-CM

## 2018-05-15 DIAGNOSIS — Z85.3 PERSONAL HISTORY OF MALIGNANT NEOPLASM OF BREAST: ICD-10-CM

## 2018-05-15 DIAGNOSIS — Z79.899 OTHER LONG TERM (CURRENT) DRUG THERAPY: ICD-10-CM

## 2018-05-15 DIAGNOSIS — E03.9 HYPOTHYROIDISM, UNSPECIFIED: ICD-10-CM

## 2018-05-15 LAB
CULTURE RESULTS: SIGNIFICANT CHANGE UP
CULTURE RESULTS: SIGNIFICANT CHANGE UP
SPECIMEN SOURCE: SIGNIFICANT CHANGE UP
SPECIMEN SOURCE: SIGNIFICANT CHANGE UP

## 2018-05-24 ENCOUNTER — APPOINTMENT (OUTPATIENT)
Dept: INFECTIOUS DISEASE | Facility: CLINIC | Age: 83
End: 2018-05-24

## 2018-05-24 PROBLEM — Z00.00 ENCOUNTER FOR PREVENTIVE HEALTH EXAMINATION: Status: ACTIVE | Noted: 2018-05-24

## 2020-02-19 NOTE — ED STATDOCS - NS ED MD DISPO ADMITTING SERVICE
MED PAST MEDICAL HISTORY:  Anxiety     BRCA positive     Breast cancer in female Right    Gastric polyp History of    Hyperlipidemia, unspecified hyperlipidemia type

## 2020-02-28 NOTE — PATIENT PROFILE ADULT. - SOCIAL CONCERNS
None
Is This A New Presentation, Or A Follow-Up?: Skin Lesions
What Type Of Note Output Would You Prefer (Optional)?: Standard Output
How Severe Is Your Skin Lesion?: severe
Has Your Skin Lesion Been Treated?: not been treated

## 2020-06-22 ENCOUNTER — APPOINTMENT (OUTPATIENT)
Dept: ORTHOPEDIC SURGERY | Facility: CLINIC | Age: 85
End: 2020-06-22
Payer: MEDICARE

## 2020-06-22 DIAGNOSIS — M65.351 TRIGGER FINGER, RIGHT LITTLE FINGER: ICD-10-CM

## 2020-06-22 PROCEDURE — 20550 NJX 1 TENDON SHEATH/LIGAMENT: CPT | Mod: F9

## 2020-06-22 PROCEDURE — 99203 OFFICE O/P NEW LOW 30 MIN: CPT | Mod: 25

## 2020-06-22 NOTE — PHYSICAL EXAM
[de-identified] : Patient is WDWN, alert, and in no acute distress. Breathing is unlabored. She is grossly oriented to person, place, and time. \par \par Right hand: There is A1 pulley tenderness in the little finger. Full arc of motion in the fingers, and all intrinsic and extrinsic hand muscles 5/5. No joint instability on provocative testing, sensation is intact to light touch, and no skin lesions or discoloration.  [de-identified] : No imaging done today.

## 2020-06-22 NOTE — HISTORY OF PRESENT ILLNESS
[Right] : right hand dominant [FreeTextEntry1] : Patient is a RHD 89 y/o female who presents with right little finger pain and triggering x 3 weeks. She states that her symptoms were of spontaneous onset as she denies injury or any antecedent event. The finger locks.  It is tender to touch.  She has tenderness over the A1 pulley. She states her symptoms only affect one finger. She denies any additional symptoms. She  has not received any treatment to date. \par

## 2020-06-22 NOTE — DISCUSSION/SUMMARY
[FreeTextEntry1] : The underlying pathophysiology was reviewed with the patient. Treatment options were discussed including; observation, NSAIDS, analgesics, injection and surgical intervention. \par \par The patient wishes to proceed with a cortisone injection at this time. The skin was prepped with alcohol and sprayed with Ethyl Chloride. An injection of 0.5 cc 1% Lidocaine without epinephrine, 0.25 cc Kenalog 40mg, and 0.25 cc Dexamethasone was administered into the flexor tendon sheath of the right little finger (1/3). The patient tolerated the procedure well. Apply ice. \par \par The patient was advised to soak the hand in warm water and Epsom salt. \par NSAIDs, as tolerated, were advised for pain and symptom management. \par Follow up as needed.

## 2020-06-22 NOTE — ADDENDUM
[FreeTextEntry1] : I, Linnea Bergman wrote this note acting as a scribe for Dr. Kwesi Rabago on Jun 22, 2020.

## 2020-06-22 NOTE — END OF VISIT
[FreeTextEntry3] : I, Kwesi Rabago MD, ordering physician, have read and attest that all the information, medical decision making and discharge instructions within are true and accurate.

## 2020-11-19 ENCOUNTER — APPOINTMENT (OUTPATIENT)
Dept: OTOLARYNGOLOGY | Facility: CLINIC | Age: 85
End: 2020-11-19
Payer: MEDICARE

## 2020-11-19 VITALS
TEMPERATURE: 97.5 F | DIASTOLIC BLOOD PRESSURE: 80 MMHG | SYSTOLIC BLOOD PRESSURE: 149 MMHG | HEIGHT: 63 IN | WEIGHT: 175 LBS | BODY MASS INDEX: 31.01 KG/M2

## 2020-11-19 DIAGNOSIS — H60.63 UNSPECIFIED CHRONIC OTITIS EXTERNA, BILATERAL: ICD-10-CM

## 2020-11-19 DIAGNOSIS — H61.23 IMPACTED CERUMEN, BILATERAL: ICD-10-CM

## 2020-11-19 PROCEDURE — 69210 REMOVE IMPACTED EAR WAX UNI: CPT

## 2020-11-19 PROCEDURE — 99213 OFFICE O/P EST LOW 20 MIN: CPT | Mod: 25

## 2020-11-19 RX ORDER — LOSARTAN POTASSIUM 100 MG/1
100 TABLET, FILM COATED ORAL
Refills: 0 | Status: ACTIVE | COMMUNITY

## 2020-11-19 RX ORDER — METOPROLOL TARTRATE 100 MG/1
100 TABLET, FILM COATED ORAL
Refills: 0 | Status: ACTIVE | COMMUNITY

## 2020-11-19 RX ORDER — LEVOTHYROXINE SODIUM 100 UG/1
100 TABLET ORAL
Refills: 0 | Status: ACTIVE | COMMUNITY

## 2020-11-19 RX ORDER — MOMETASONE FUROATE 1 MG/G
0.1 CREAM TOPICAL TWICE DAILY
Qty: 1 | Refills: 1 | Status: ACTIVE | COMMUNITY
Start: 2020-11-19 | End: 1900-01-01

## 2020-11-19 RX ORDER — ATORVASTATIN CALCIUM 10 MG/1
10 TABLET, FILM COATED ORAL
Refills: 0 | Status: ACTIVE | COMMUNITY

## 2020-11-19 RX ORDER — GABAPENTIN 300 MG/1
300 CAPSULE ORAL
Refills: 0 | Status: ACTIVE | COMMUNITY

## 2020-11-19 NOTE — PHYSICAL EXAM
[Normal] : mucosa is normal [Midline] : trachea located in midline position [de-identified] : CI AU, dry flaky skin

## 2020-11-19 NOTE — HISTORY OF PRESENT ILLNESS
[de-identified] : 88 yr old female c/o clog, itch and burning AU for 2 weeks.\par -tinnitus, otorrhea, dizzy\par denies Qtips\par -hx otitis, noise exp, head trauma, FH

## 2020-11-19 NOTE — REVIEW OF SYSTEMS
[Ear Pain] : ear pain [Ear Itch] : ear itch [Negative] : Heme/Lymph [As Noted in HPI] : as noted in HPI

## 2022-03-15 NOTE — ED ADULT NURSE NOTE - [facility] ED SYMPTOMS POS
Problem: PHYSICAL THERAPY ADULT  Goal: Performs mobility at highest level of function for planned discharge setting  See evaluation for individualized goals  Description:  Outcome: Progressing  Note: Prognosis: Good  Problem List: Decreased strength,Decreased endurance,Impaired balance,Decreased mobility,Decreased safety awareness  Assessment: Pt  seen for PT treatment session this date with interventions consisting of  therapeutic exercises, bed mobility, transfers and  gait training w/ emphasis on improving pt's ability to ambulate  Pt  Requiring occasional cues for sequence and safety  In comparison to previous session, Pt  With decrease in activity tolerance  C/o fatigue from no sleep limiting ambulation  Pt is in need of continued activity in PT to improve strength balance endurance mobility transfers and ambulation with return to maximize LOF  From PT/mobility standpoint, recommendation at time of d/c would be home health rehab  in order to promote return to PLOF and independence  The patient's AM-PAC Basic Mobility Inpatient Short Form Raw Score is 18  A Raw score of greater than 16 suggests the patient may benefit from discharge to home  Please also refer to physical therapy recommendation for safe DC planning  Barriers to Discharge: Decreased caregiver support        PT Discharge Recommendation: Post acute rehabilitation services          See flowsheet documentation for full assessment  REDNESS/cellulitis

## 2022-06-25 ENCOUNTER — NON-APPOINTMENT (OUTPATIENT)
Age: 87
End: 2022-06-25

## 2022-07-30 ENCOUNTER — NON-APPOINTMENT (OUTPATIENT)
Age: 87
End: 2022-07-30

## 2023-04-10 ENCOUNTER — APPOINTMENT (OUTPATIENT)
Dept: ORTHOPEDIC SURGERY | Facility: CLINIC | Age: 88
End: 2023-04-10
Payer: MEDICARE

## 2023-04-10 VITALS — HEIGHT: 63 IN | BODY MASS INDEX: 30.12 KG/M2 | WEIGHT: 170 LBS

## 2023-04-10 DIAGNOSIS — I10 ESSENTIAL (PRIMARY) HYPERTENSION: ICD-10-CM

## 2023-04-10 DIAGNOSIS — E78.00 PURE HYPERCHOLESTEROLEMIA, UNSPECIFIED: ICD-10-CM

## 2023-04-10 DIAGNOSIS — I89.0 LYMPHEDEMA, NOT ELSEWHERE CLASSIFIED: ICD-10-CM

## 2023-04-10 DIAGNOSIS — C80.1 MALIGNANT (PRIMARY) NEOPLASM, UNSPECIFIED: ICD-10-CM

## 2023-04-10 DIAGNOSIS — Z78.9 OTHER SPECIFIED HEALTH STATUS: ICD-10-CM

## 2023-04-10 PROCEDURE — 99203 OFFICE O/P NEW LOW 30 MIN: CPT

## 2023-04-10 NOTE — PHYSICAL EXAM
[NL (90)] : forward flexion 90 degrees [NL (30)] : right lateral bending 30 degrees [Left] : left foot and ankle [NL (20)] : dorsiflexion 20 degrees [NL (40)] : plantar flexion 40 degrees [5___] : eversion 5[unfilled]/5 [Normal Mood and Affect] : normal mood and affect [Able to Communicate] : able to communicate [Well Developed] : well developed [Well Nourished] : well nourished [] : no SI joint tenderness [FreeTextEntry3] : Venous stasis changes. [FreeTextEntry9] : Limited inversion and eversion.

## 2023-04-10 NOTE — REASON FOR VISIT
[FreeTextEntry2] : Sharp left lower leg pain past 6 month with swelling . with pain level 0 active and 10 resting . Occasional  right lower leg pain..

## 2023-04-10 NOTE — HISTORY OF PRESENT ILLNESS
[de-identified] : 4/10/23  Initial visit for this 89 year female who noticed pain and swelling lt leg x 6 months ago. Went to urgent care where she was started on oral ABS which helped. pt noticed a recurrence 3-4 months ago and was placed on the same abs which helped.  She had seen a vascular surgeon one month ago due to persistent lower leg swelling with sharp pains, who did a Doppler study of her left LE, which was negative for DVT and he prescribed a diuretic, which she did not take. Pain is intermittent, and mostly when sitting and is her shin only to the stop of her left foot.  Said she was diagnosed by a neurologist years ago that she has neuropathy in both legs and she takes gabapentin for this.  No complaint of back pain.  She uses a walker. She also states she has done PT for many visits with the pain still persisting.  [] : no [FreeTextEntry1] : bilateral low leg pain [FreeTextEntry9] : no treatment [de-identified] : 3./23 [de-identified] : DR Garcia-great neck- vascular [de-identified] : USDoppler left leg [de-identified] : none

## 2023-08-26 ENCOUNTER — INPATIENT (INPATIENT)
Facility: HOSPITAL | Age: 88
LOS: 3 days | Discharge: NURSING, REHAB OR LONG-TERM CARE | DRG: 481 | End: 2023-08-30
Attending: INTERNAL MEDICINE | Admitting: STUDENT IN AN ORGANIZED HEALTH CARE EDUCATION/TRAINING PROGRAM
Payer: MEDICARE

## 2023-08-26 VITALS
HEART RATE: 74 BPM | SYSTOLIC BLOOD PRESSURE: 164 MMHG | OXYGEN SATURATION: 95 % | WEIGHT: 169.98 LBS | HEIGHT: 63 IN | DIASTOLIC BLOOD PRESSURE: 76 MMHG | TEMPERATURE: 98 F | RESPIRATION RATE: 18 BRPM

## 2023-08-26 DIAGNOSIS — S72.002A FRACTURE OF UNSPECIFIED PART OF NECK OF LEFT FEMUR, INITIAL ENCOUNTER FOR CLOSED FRACTURE: ICD-10-CM

## 2023-08-26 LAB
ABO RH CONFIRMATION: SIGNIFICANT CHANGE UP
ANION GAP SERPL CALC-SCNC: 4 MMOL/L — LOW (ref 5–17)
APTT BLD: 26.3 SEC — SIGNIFICANT CHANGE UP (ref 24.5–35.6)
BASOPHILS # BLD AUTO: 0.1 K/UL — SIGNIFICANT CHANGE UP (ref 0–0.2)
BASOPHILS NFR BLD AUTO: 0.6 % — SIGNIFICANT CHANGE UP (ref 0–2)
BLD GP AB SCN SERPL QL: SIGNIFICANT CHANGE UP
BUN SERPL-MCNC: 17 MG/DL — SIGNIFICANT CHANGE UP (ref 7–23)
CALCIUM SERPL-MCNC: 9 MG/DL — SIGNIFICANT CHANGE UP (ref 8.5–10.1)
CHLORIDE SERPL-SCNC: 108 MMOL/L — SIGNIFICANT CHANGE UP (ref 96–108)
CK SERPL-CCNC: 169 U/L — SIGNIFICANT CHANGE UP (ref 26–192)
CO2 SERPL-SCNC: 26 MMOL/L — SIGNIFICANT CHANGE UP (ref 22–31)
CREAT SERPL-MCNC: 0.85 MG/DL — SIGNIFICANT CHANGE UP (ref 0.5–1.3)
EGFR: 65 ML/MIN/1.73M2 — SIGNIFICANT CHANGE UP
EOSINOPHIL # BLD AUTO: 0.07 K/UL — SIGNIFICANT CHANGE UP (ref 0–0.5)
EOSINOPHIL NFR BLD AUTO: 0.4 % — SIGNIFICANT CHANGE UP (ref 0–6)
GLUCOSE SERPL-MCNC: 127 MG/DL — HIGH (ref 70–99)
HCT VFR BLD CALC: 37.1 % — SIGNIFICANT CHANGE UP (ref 34.5–45)
HGB BLD-MCNC: 12.4 G/DL — SIGNIFICANT CHANGE UP (ref 11.5–15.5)
IMM GRANULOCYTES NFR BLD AUTO: 0.3 % — SIGNIFICANT CHANGE UP (ref 0–0.9)
INR BLD: 0.97 RATIO — SIGNIFICANT CHANGE UP (ref 0.85–1.18)
LYMPHOCYTES # BLD AUTO: 1.14 K/UL — SIGNIFICANT CHANGE UP (ref 1–3.3)
LYMPHOCYTES # BLD AUTO: 6.3 % — LOW (ref 13–44)
MCHC RBC-ENTMCNC: 32.2 PG — SIGNIFICANT CHANGE UP (ref 27–34)
MCHC RBC-ENTMCNC: 33.4 GM/DL — SIGNIFICANT CHANGE UP (ref 32–36)
MCV RBC AUTO: 96.4 FL — SIGNIFICANT CHANGE UP (ref 80–100)
MONOCYTES # BLD AUTO: 0.92 K/UL — HIGH (ref 0–0.9)
MONOCYTES NFR BLD AUTO: 5.1 % — SIGNIFICANT CHANGE UP (ref 2–14)
NEUTROPHILS # BLD AUTO: 15.87 K/UL — HIGH (ref 1.8–7.4)
NEUTROPHILS NFR BLD AUTO: 87.3 % — HIGH (ref 43–77)
PLATELET # BLD AUTO: 176 K/UL — SIGNIFICANT CHANGE UP (ref 150–400)
POTASSIUM SERPL-MCNC: 3.8 MMOL/L — SIGNIFICANT CHANGE UP (ref 3.5–5.3)
POTASSIUM SERPL-SCNC: 3.8 MMOL/L — SIGNIFICANT CHANGE UP (ref 3.5–5.3)
PROTHROM AB SERPL-ACNC: 11 SEC — SIGNIFICANT CHANGE UP (ref 9.5–13)
RBC # BLD: 3.85 M/UL — SIGNIFICANT CHANGE UP (ref 3.8–5.2)
RBC # FLD: 14.6 % — HIGH (ref 10.3–14.5)
SODIUM SERPL-SCNC: 138 MMOL/L — SIGNIFICANT CHANGE UP (ref 135–145)
WBC # BLD: 18.16 K/UL — HIGH (ref 3.8–10.5)
WBC # FLD AUTO: 18.16 K/UL — HIGH (ref 3.8–10.5)

## 2023-08-26 PROCEDURE — P9016: CPT

## 2023-08-26 PROCEDURE — 83735 ASSAY OF MAGNESIUM: CPT

## 2023-08-26 PROCEDURE — 93005 ELECTROCARDIOGRAM TRACING: CPT

## 2023-08-26 PROCEDURE — 86920 COMPATIBILITY TEST SPIN: CPT

## 2023-08-26 PROCEDURE — 80048 BASIC METABOLIC PNL TOTAL CA: CPT

## 2023-08-26 PROCEDURE — 97530 THERAPEUTIC ACTIVITIES: CPT | Mod: GO

## 2023-08-26 PROCEDURE — 36430 TRANSFUSION BLD/BLD COMPNT: CPT

## 2023-08-26 PROCEDURE — 86901 BLOOD TYPING SEROLOGIC RH(D): CPT

## 2023-08-26 PROCEDURE — 76376 3D RENDER W/INTRP POSTPROCES: CPT | Mod: 26

## 2023-08-26 PROCEDURE — 97162 PT EVAL MOD COMPLEX 30 MIN: CPT | Mod: GP

## 2023-08-26 PROCEDURE — 93010 ELECTROCARDIOGRAM REPORT: CPT

## 2023-08-26 PROCEDURE — 99285 EMERGENCY DEPT VISIT HI MDM: CPT

## 2023-08-26 PROCEDURE — 86923 COMPATIBILITY TEST ELECTRIC: CPT

## 2023-08-26 PROCEDURE — 82040 ASSAY OF SERUM ALBUMIN: CPT

## 2023-08-26 PROCEDURE — 76000 FLUOROSCOPY <1 HR PHYS/QHP: CPT

## 2023-08-26 PROCEDURE — 73502 X-RAY EXAM HIP UNI 2-3 VIEWS: CPT | Mod: 26,LT

## 2023-08-26 PROCEDURE — 73562 X-RAY EXAM OF KNEE 3: CPT | Mod: 26,LT

## 2023-08-26 PROCEDURE — 72192 CT PELVIS W/O DYE: CPT | Mod: 26,MG

## 2023-08-26 PROCEDURE — G1004: CPT

## 2023-08-26 PROCEDURE — 86850 RBC ANTIBODY SCREEN: CPT

## 2023-08-26 PROCEDURE — 85730 THROMBOPLASTIN TIME PARTIAL: CPT

## 2023-08-26 PROCEDURE — 85025 COMPLETE CBC W/AUTO DIFF WBC: CPT

## 2023-08-26 PROCEDURE — 85027 COMPLETE CBC AUTOMATED: CPT

## 2023-08-26 PROCEDURE — 86900 BLOOD TYPING SEROLOGIC ABO: CPT

## 2023-08-26 PROCEDURE — 82306 VITAMIN D 25 HYDROXY: CPT

## 2023-08-26 PROCEDURE — 71045 X-RAY EXAM CHEST 1 VIEW: CPT | Mod: 26

## 2023-08-26 PROCEDURE — 97116 GAIT TRAINING THERAPY: CPT | Mod: GP

## 2023-08-26 PROCEDURE — C1889: CPT

## 2023-08-26 PROCEDURE — 36415 COLL VENOUS BLD VENIPUNCTURE: CPT

## 2023-08-26 PROCEDURE — C1713: CPT

## 2023-08-26 PROCEDURE — 73552 X-RAY EXAM OF FEMUR 2/>: CPT | Mod: 26,LT

## 2023-08-26 PROCEDURE — 97535 SELF CARE MNGMENT TRAINING: CPT | Mod: GO

## 2023-08-26 PROCEDURE — 99223 1ST HOSP IP/OBS HIGH 75: CPT

## 2023-08-26 PROCEDURE — 97166 OT EVAL MOD COMPLEX 45 MIN: CPT | Mod: GO

## 2023-08-26 PROCEDURE — 85610 PROTHROMBIN TIME: CPT

## 2023-08-26 RX ORDER — MORPHINE SULFATE 50 MG/1
4 CAPSULE, EXTENDED RELEASE ORAL ONCE
Refills: 0 | Status: DISCONTINUED | OUTPATIENT
Start: 2023-08-26 | End: 2023-08-26

## 2023-08-26 RX ORDER — ALPRAZOLAM 0.25 MG
1 TABLET ORAL
Qty: 0 | Refills: 0 | DISCHARGE

## 2023-08-26 RX ORDER — ATORVASTATIN CALCIUM 80 MG/1
10 TABLET, FILM COATED ORAL AT BEDTIME
Refills: 0 | Status: DISCONTINUED | OUTPATIENT
Start: 2023-08-26 | End: 2023-08-30

## 2023-08-26 RX ORDER — HEPARIN SODIUM 5000 [USP'U]/ML
5000 INJECTION INTRAVENOUS; SUBCUTANEOUS ONCE
Refills: 0 | Status: COMPLETED | OUTPATIENT
Start: 2023-08-26 | End: 2023-08-26

## 2023-08-26 RX ORDER — MULTIVIT-MIN/FERROUS GLUCONATE 9 MG/15 ML
1 LIQUID (ML) ORAL
Qty: 0 | Refills: 0 | DISCHARGE

## 2023-08-26 RX ORDER — MORPHINE SULFATE 50 MG/1
2 CAPSULE, EXTENDED RELEASE ORAL EVERY 4 HOURS
Refills: 0 | Status: DISCONTINUED | OUTPATIENT
Start: 2023-08-26 | End: 2023-08-27

## 2023-08-26 RX ORDER — NALOXONE HYDROCHLORIDE 4 MG/.1ML
0.4 SPRAY NASAL ONCE
Refills: 0 | Status: DISCONTINUED | OUTPATIENT
Start: 2023-08-26 | End: 2023-08-30

## 2023-08-26 RX ORDER — TAMOXIFEN CITRATE 20 MG/1
1 TABLET, FILM COATED ORAL
Qty: 0 | Refills: 0 | DISCHARGE

## 2023-08-26 RX ORDER — ACETAMINOPHEN 500 MG
1000 TABLET ORAL EVERY 8 HOURS
Refills: 0 | Status: DISCONTINUED | OUTPATIENT
Start: 2023-08-26 | End: 2023-08-30

## 2023-08-26 RX ORDER — GABAPENTIN 400 MG/1
300 CAPSULE ORAL THREE TIMES A DAY
Refills: 0 | Status: DISCONTINUED | OUTPATIENT
Start: 2023-08-26 | End: 2023-08-30

## 2023-08-26 RX ORDER — SENNA PLUS 8.6 MG/1
2 TABLET ORAL AT BEDTIME
Refills: 0 | Status: DISCONTINUED | OUTPATIENT
Start: 2023-08-26 | End: 2023-08-30

## 2023-08-26 RX ORDER — LEVOTHYROXINE SODIUM 125 MCG
112 TABLET ORAL DAILY
Refills: 0 | Status: DISCONTINUED | OUTPATIENT
Start: 2023-08-27 | End: 2023-08-30

## 2023-08-26 RX ORDER — POLYETHYLENE GLYCOL 3350 17 G/17G
17 POWDER, FOR SOLUTION ORAL DAILY
Refills: 0 | Status: DISCONTINUED | OUTPATIENT
Start: 2023-08-26 | End: 2023-08-27

## 2023-08-26 RX ORDER — LOSARTAN POTASSIUM 100 MG/1
1 TABLET, FILM COATED ORAL
Qty: 0 | Refills: 0 | DISCHARGE

## 2023-08-26 RX ORDER — ENOXAPARIN SODIUM 100 MG/ML
40 INJECTION SUBCUTANEOUS EVERY 24 HOURS
Refills: 0 | Status: DISCONTINUED | OUTPATIENT
Start: 2023-08-26 | End: 2023-08-27

## 2023-08-26 RX ORDER — CALCIUM CARBONATE 500(1250)
1 TABLET ORAL
Qty: 0 | Refills: 0 | DISCHARGE

## 2023-08-26 RX ORDER — MORPHINE SULFATE 50 MG/1
4 CAPSULE, EXTENDED RELEASE ORAL EVERY 4 HOURS
Refills: 0 | Status: DISCONTINUED | OUTPATIENT
Start: 2023-08-26 | End: 2023-08-27

## 2023-08-26 RX ORDER — ACETAMINOPHEN 500 MG
1000 TABLET ORAL ONCE
Refills: 0 | Status: COMPLETED | OUTPATIENT
Start: 2023-08-26 | End: 2023-08-26

## 2023-08-26 RX ORDER — METOPROLOL TARTRATE 50 MG
100 TABLET ORAL DAILY
Refills: 0 | Status: DISCONTINUED | OUTPATIENT
Start: 2023-08-26 | End: 2023-08-30

## 2023-08-26 RX ADMIN — Medication 400 MILLIGRAM(S): at 17:49

## 2023-08-26 RX ADMIN — MORPHINE SULFATE 4 MILLIGRAM(S): 50 CAPSULE, EXTENDED RELEASE ORAL at 17:47

## 2023-08-26 RX ADMIN — GABAPENTIN 300 MILLIGRAM(S): 400 CAPSULE ORAL at 23:57

## 2023-08-26 NOTE — ED ADULT NURSE NOTE - NSFALLUNIVINTERV_ED_ALL_ED
Bed/Stretcher in lowest position, wheels locked, appropriate side rails in place/Call bell, personal items and telephone in reach/Instruct patient to call for assistance before getting out of bed/chair/stretcher/Non-slip footwear applied when patient is off stretcher/[place] to call system/Physically safe environment - no spills, clutter or unnecessary equipment/Purposeful proactive rounding/Room/bathroom lighting operational, light cord in reach

## 2023-08-26 NOTE — H&P ADULT - ASSESSMENT
89 year old female PMHx significant for Left invasive ductal breast ca s/p lumpectomy s/p chemo/xrt 2008, Hypertension, Hyperlipidemia, hypothyroidism due to Grave's disease s/p BARBER, BCC of the skin, community ambulator with assistive device (walker) was BIBA from home after after suffering from an unwitnessed mechanical fall with resultant severe (10/10 in intensity) left hip pain. Of note the patient was down for approximately one hour. The patient denies any associated prodrome of dizziness, palpitations, chest pain, or shortness of breath. Additionally the patient denies any head strike, loss of consciousness, or other injury to note. The patient is not currently taking any anticoagulation/antiplatelet therapy. At baseline the patient is able to perform her ADLs alothough requires assistive device to ambulate (walker).      89 year old female PMHx significant for Left invasive ductal breast ca s/p lumpectomy s/p chemo/xrt 2008, Hypertension, Hyperlipidemia, hypothyroidism due to Grave's disease s/p BARBER, BCC of the skin, community ambulator with assistive device (walker) was BIBA from home after suffering from an unwitnessed mechanical fall with resultant severe (10/10 in intensity) left hip pain. Of note the patient was down for approximately one hour. The patient denies any associated prodrome of dizziness, palpitations, chest pain, or shortness of breath. Additionally the patient denies any head strike, loss of consciousness, or other injury to note. The patient is not currently taking any anticoagulation/antiplatelet therapy. At baseline the patient is able to perform her ADLs although requires assistive device to ambulate (walker).     #Falls in the Elderly with resultant Left Intertrochanteric Hip Fracture  ~admit to Medicine  ~Imaging reviewed revealed an acute left intertrochanteric hip fracture  ~f/u w/ Orthopedics in the am  ~plan at this time is for operative fixation of hip fracture  ~NWB  ~strict bedrest  ~NPO at midnight, hold anticoagulation  ~f/u Preoperative labs, imaging    #Hypothyroidism  ~cont. Levothyroxine 112mcg po qam    #Hypertension  ~will hold Losartan (takes 100mg po daily) (please reassess post-operatively)  ~cont. Metoprolol ER 100mg po daily    #Hyperlipidemia  ~cont. statin therapy with Atorvastatin 10mg po qhs     #Peripheral Neuropathy  ~cont. Gabapentin 300mg po tid    #Vte ppx  ~given Heparin 5000u sc x 1  ~cont. SCDs  ~f/u w/ Orthopedics concern post-operative Vte ppx               89 year old female PMHx significant for Left invasive ductal breast ca s/p lumpectomy s/p chemo/xrt 2008, Hypertension, Hyperlipidemia, hypothyroidism due to Grave's disease s/p BARBER, BCC of the skin, community ambulator with assistive device (walker) was BIBA from home after suffering from an unwitnessed mechanical fall with resultant severe (10/10 in intensity) left hip pain. Of note the patient was down for approximately one hour. The patient denies any associated prodrome of dizziness, palpitations, chest pain, or shortness of breath. Additionally the patient denies any head strike, loss of consciousness, or other injury to note. The patient is not currently taking any anticoagulation/antiplatelet therapy. At baseline the patient is able to perform her ADLs although requires assistive device to ambulate (walker).     #Falls in the Elderly with resultant Left Intertrochanteric Hip Fracture  ~admit to Medicine  ~Imaging reviewed revealed an acute left intertrochanteric hip fracture  ~f/u w/ Orthopedics in the am  ~plan at this time is for operative fixation of hip fracture  ~NWB  ~strict bedrest  ~NPO at midnight, hold anticoagulation  ~f/u Preoperative labs, imaging  ~Patient's estimated perioperative risk of MACE based on combined clinical/surgical risk is low or < 1% given number of risk factors. At this time no further presurgical testing is warranted. As noted above patient is a community ambulator at baseline without use of assistive devices at baseline. Medical optimization is sufficient, and at this time no further presurgical testing is warranted.    #Hypothyroidism  ~cont. Levothyroxine 112mcg po qam    #Hypertension  ~will hold Losartan (takes 100mg po daily) (please reassess post-operatively)  ~cont. Metoprolol ER 100mg po daily    #Hyperlipidemia  ~cont. statin therapy with Atorvastatin 10mg po qhs     #Peripheral Neuropathy  ~cont. Gabapentin 300mg po tid    #Vte ppx  ~given Heparin 5000u sc x 1  ~cont. SCDs  ~f/u w/ Orthopedics concern post-operative Vte ppx               89 year old female PMHx significant for Left invasive ductal breast ca s/p lumpectomy s/p chemo/xrt 2008, Hypertension, Hyperlipidemia, hypothyroidism due to Grave's disease s/p BARBER, BCC of the skin, community ambulator with assistive device (walker) was BIBA from home after suffering from an unwitnessed mechanical fall with resultant severe (10/10 in intensity) left hip pain. Of note the patient was down for approximately one hour. The patient denies any associated prodrome of dizziness, palpitations, chest pain, or shortness of breath. Additionally the patient denies any head strike, loss of consciousness, or other injury to note. The patient is not currently taking any anticoagulation/antiplatelet therapy. At baseline the patient is able to perform her ADLs although requires assistive device to ambulate (walker).     #Falls in the Elderly with resultant Left Intertrochanteric Hip Fracture  ~admit to Medicine  ~Imaging reviewed revealed an acute left intertrochanteric hip fracture  ~f/u w/ Orthopedics in the am  ~plan at this time is for operative fixation of hip fracture  ~NWB  ~strict bedrest  ~NPO at midnight, hold anticoagulation  ~f/u Preoperative labs, imaging  ~Patient's estimated perioperative risk of MACE based on combined clinical/surgical risk is low or < 1% given number of risk factors. At this time no further presurgical testing is warranted. As noted above patient is a community ambulator at baseline (via walker). Medical optimization is sufficient, and at this time no further presurgical testing is warranted.    #Hypothyroidism  ~cont. Levothyroxine 112mcg po qam    #Hypertension  ~will hold Losartan (takes 100mg po daily) (please reassess post-operatively)  ~cont. Metoprolol ER 100mg po daily    #Hyperlipidemia  ~cont. statin therapy with Atorvastatin 10mg po qhs     #Peripheral Neuropathy  ~cont. Gabapentin 300mg po tid    #Vte ppx  ~given Heparin 5000u sc x 1  ~cont. SCDs  ~f/u w/ Orthopedics concern post-operative Vte ppx

## 2023-08-26 NOTE — ED ADULT NURSE NOTE - NSFALLASSESSNEED_ED_ALL_ED
OT Daily Note  Today's date: 2021  Patient name: Merlyn Villa  : 2018  MRN: [id number]  Referring provider: Eusebio Boo  Dx:   Encounter Diagnosis     ICD-10-CM    1  Developmental delay  R62 50      Following established CDC and hospital protocols confirmed that Milderd Steve was wearing an appropriate mask or face covering (PPE) OR Child was not able to wear facemask due to age/condition  Therapist was wearing the appropriate PPE consisting of surgical mask, KN95 mask, glasses, or face shield depending on patients masking status  The mandatory travel, community and communication screening was completed prior to entering the clinic and documented by the therapist, with the result of no illness or risk present or suspected  Milderd Steve  was accompanied directly into a disinfected and clean therapy gym using social distancing with other staff/peers  Subjective: Katheryn transitioned to SLP/OT co-treatment with minimal resistance  Difficulty with structured activities during today's session, however increased attention and focus during last five minutes of session  Objective:     Katheryn will engage in simple play activities from start to finish with the use of sensory strategies as needed    6/3: Katheryn engaged in tactile sensory play with water beads and bath toys  Nice visual attention and participation was observed  During sensory play, Katheryn appeared upset and threw the water beads  The water beads was removed and Katheryn was observed crying, screaming, hitting, kicking, and banging head on wall  Bin with water, animals, and a few water beads, and [place] music was utilized to engage Katheryn back into activity  She displayed successful self-regulation and was able to continue participation and accepted deep pressure for proprioceptive input  6/7: Milderd Steve accepted deep pressure for prioprioceptive input throughout the session   Katheryn engaged in playing with play cecy and bingo stampers at tabletop with nice participation  Stacking blocks, rolling a ball, and playing with pegs was also utilized in session to address participation, visual attention, and focus  6/10: Redirection and verbal prompting for Katheryn to engage in picking up bed bugs and placing in water beads  She was not receptive toward hand over hand and demonstrated difficulty particpating initially, however was able to successfully take out beg bugs and place in water beads without physical prompting  6/14: Watercolor painting with animal pop blocs, bowling with heavy ball, and rings on cone was utilized to address play skills and participation  Joy Toledo demonstrated nice participation with some redirection for attention to activity  Deep pressure was utilized throughout session for proprioceptive input  6/17: Rolling and bouncing with heavy ball into bowling pins was used to allow a preferred activity to be paired with a structured task  6/21: Water beads was utilized in the beginning of the session to promote motivation during play activities  Play cecy was also used for added tactile sensory play  6/24: Joy Toledo engaged with tactile sensory play with play cecy and taking out small bugs  She straddled peanut ball during [employer] picture book for sensory support and to address postural stability  Joy Toledo will engage in back and forth play/turn taking with therapist support as needed in order to increase her success with playing with family and peers  6/3Karyl Ben engaged in parallel sensory play with her brother however turn taking not directly addressed today  6/7: Nice parallel play and cooperative play was displayed with Joy Toledo and brother with rolling a ball and placing pegs in board, however structured turn taking was not addressed  6/10: Katheryn and brother took turns bouncing heavy ball into rolling pins and took turns cheering for each other     6/14: Structured turn taking was utilized throughout the session with painting animal blocs, bowling, and stacking rings on cone  Katheryn required hand over hand and verbal redirection to engage in turn taking with brother and therapists  6/17: not directly addressed   6/21: not directly addressed, however [place] of play was observed with spin toy    6/24: Not directly addressed, however Juany Bonner was able to participate in five minutes of structured False [place] of play with throwing ball of tape back and forth  Juany Bonner will visually attend to a book and use her index finger to point at pictures with hand over hand assistance as needed  6/3: Not directly addressed, however Katheryn displayed nice visual attention to sensory play with water beads  6/7: Juany Bonner displayed nice visual attention to placing bears into cup, blocks, and pegs  6/10: Excellent visual attention when bouncing the balls into the bowling pins  6/14: Intermittent visual attention bouncing heavy ball into bowling pins  6/17: Nice participation and engagement with taking velcro pictures and placing in book  6/21: Nice visual attention during spin away toy and stamping stamps in play cecy    6/24: Nice visual attention when engaging in pre-writing on tape placed on door and placing velcro pictures in book  Parent will be compliant with home sensory program in order to meet Katehryn's sensory needs    6/3: Mom reports behaviors have been increasing  Discussion of the possibility of TSS services  6/7: Mom reports moving forward with TSS services due to Aria's increased behaviors  6/10: Mom continues to integrate the home sensory program for Aria's sensory needs  6/14: Mom continues to integrate the home sensory program for Aria's sensory needs  6/17: not addressed   6/21: not addressed  6/24: Not addressed    Other: Katheryn demonstrated vertical and horizontal lines with hand over hand assistance on tape placed on the door       Assessment: Juany Bonner is a 1year old female receiving skilled OT services for deficits in sensory processing, attention to task and functional play skills  Kayla Vernell demonstrated jumping from activity to activity in the beginning of session with decreased attention  Today's session Kayla Emeraldmariah engaged with spin away toy, placing small bugs in a cup, taking bugs out of play cecy, pre-writing on tape, and throwing ball back and forth  Verbal redirection and physical cuing utilized throughout session to maintain attention and participation  Rosadimitri Matt continues to have a general delay in skills  It is recommended that Aria continue OT 2x/week per plan of care  Plan: Continue OT 2x/week for 12 weeks  no

## 2023-08-26 NOTE — H&P ADULT - NSICDXPASTMEDICALHX_GEN_ALL_CORE_FT
PAST MEDICAL HISTORY:  Adult hypothyroidism     Breast cancer     History of peripheral neuropathy     HLD (hyperlipidemia)     HTN (hypertension)      PAST MEDICAL HISTORY:  Adult hypothyroidism     Basal cell carcinoma (BCC)     Breast cancer     History of Graves' disease     History of peripheral neuropathy     HLD (hyperlipidemia)     HTN (hypertension)

## 2023-08-26 NOTE — PATIENT PROFILE ADULT - FALL HARM RISK - HARM RISK INTERVENTIONS

## 2023-08-26 NOTE — ED ADULT NURSE REASSESSMENT NOTE - NS ED NURSE REASSESS COMMENT FT1
Pt refusing lea catheter at this time. MD Powell made aware. Pt TBA to 2N and report given at this time with transport pending. Pt safety maintained.

## 2023-08-26 NOTE — ED ADULT NURSE REASSESSMENT NOTE - NS ED NURSE REASSESS COMMENT FT1
Pt report received from Antonio Arias RN at 1900. Pt contact made. Pt has no new complaints at this time. Pt is Aox4 and speaking in full sentences. Pt to have followup CT - plan pending.  Pt safety and comfort maintained.

## 2023-08-26 NOTE — ED ADULT TRIAGE NOTE - CHIEF COMPLAINT QUOTE
BIBEMS for isolated left thigh pain s/p unwitnessed fall. pt was walking when one of her shoes got stuck and she tripped, landing onto left side. as per EMS, pt was on floor for about 1 hour. no obvious gross deformity to extremity. pt unable to ambulate. denies head strike, denies LOC, not on anticoagulant therapy. pt endorsing pain upon weight bearing.

## 2023-08-26 NOTE — ED PROVIDER NOTE - OBJECTIVE STATEMENT
Patient presents to ED complaining of left hip pain constant achy nonradiating 9 out of 10 in severity after acute mechanical fall earlier today she was on the ground for about 1 hour.  Denies headache or blurry vision.  No chest pain or shortness of breath.  No abdominal pain.  No nausea vomiting diarrhea.  Patient is unable to ambulate secondary to pain in left hip.  She denies any Eliquis or Coumadin or Xarelto

## 2023-08-26 NOTE — H&P ADULT - NSICDXPASTSURGICALHX_GEN_ALL_CORE_FT
PAST SURGICAL HISTORY:  No significant past surgical history      PAST SURGICAL HISTORY:  History of cataract surgery     History of tonsillectomy     S/P dilation and curettage     Status post left breast lumpectomy

## 2023-08-26 NOTE — ED PROVIDER NOTE - CLINICAL SUMMARY MEDICAL DECISION MAKING FREE TEXT BOX
pain controlled patient neurovascular intact acute left hip fracture spoke to Ortho resident coming to evaluate patient will require medicine admission patient patient's daughter at bedside agree to plan of care

## 2023-08-26 NOTE — CONSULT NOTE ADULT - SUBJECTIVE AND OBJECTIVE BOX
89y Female community ambulator with assistive device (walker) presents to the ED with left hip pain after mechanical fall today. Patient denies headstrike or LOC. Denies AC. Patient noticed immediate pain and inability to ambulate over the affected hip. Patient subsequently came to the ED for further evaluation and management. In the ED, endorses pain over the hip/groin area and pain with range of motion. Patient denies any fevers, chills, numbness, tingling, weakness, or any other orthopaedic complaint.     Physical Exam  Vital Signs Last 24 Hrs  T(C): 36.6 (08-26-23 @ 14:43), Max: 36.6 (08-26-23 @ 14:43)  T(F): 97.8 (08-26-23 @ 14:43), Max: 97.8 (08-26-23 @ 14:43)  HR: 61 (08-26-23 @ 19:30) (61 - 74)  BP: 138/68 (08-26-23 @ 19:30) (138/68 - 164/76)  BP(mean): --  RR: 17 (08-26-23 @ 19:30) (17 - 18)  SpO2: 95% (08-26-23 @ 19:30) (95% - 95%)    Gen: Resting in bed, NAD  L LE:   Skin intact. No edema, erythema, or lesions throughout the extremity. Extremity is shortened and externally rotated.   TTP over the hip/groin. Pain with AROM/PROM of the hip. NTTP throughout the rest of the extremity.   SILT L2-S1  GSC/TA/EHL/FHL intact; Q/H unable to assess 2/2 pain.   DP pulse palpable.   No calf tenderness bilaterally.   Compartments soft and compressible.     Secondary Assessment:  NC/AT, NTTP of clavicles, NTTP of C-spine,T-spine, or L-spine in the midline and paraspinal areas; NTTP of pelvis  LUE: NTTP of Shoulder, Elbow, Wrist, Hand; NT with AROM/PROM of Shoulder, Elbow, Wrist, Hand; AIN/PIN/Med/Uln/Msc/Rad/Ax intact  RUE: NTTP of Shoulder, Elbow, Wrist, Hand; NT with AROM/PROM of Shoulder, Elbow, Wrist, Hand; AIN/PIN/Med/Uln/Msc/Rad/Ax intact   RLE: Able to SLR, NT with Log Roll, NT with Heel Strike, NTTP of Hip, Knee, Ankle, Foot; NT with AROM/PROM of Hip, Knee, Ankle, Foot; Q/H/GSC/TA/EHL/FHL intact                          12.4   18.16 )-----------( 176      ( 26 Aug 2023 17:40 )             37.1     26 Aug 2023 17:40    138    |  108    |  17     ----------------------------<  127    3.8     |  26     |  0.85     Ca    9.0        26 Aug 2023 17:40      PT/INR - ( 26 Aug 2023 17:40 )   PT: 11.0 sec;   INR: 0.97 ratio         PTT - ( 26 Aug 2023 17:40 )  PTT:26.3 sec    Imaging: XR L hip reviewed demonstrating L IT fx    Assessment and Plan  89y Female with L hip fracture    Imaging findings reviewed and discussed with the patient. Need for operative intervention discussed.   Plan for OR for operative fixation of hip fracture - please document medical clearance.   NWB, strict bedrest  NPO at midnight, hold anticoagulation  Preoperative labs, imaging  Dispo: pending OR for fixation of hip fracture  Will discuss plan with Dr. Kramer and will advise of any changes.

## 2023-08-26 NOTE — H&P ADULT - HISTORY OF PRESENT ILLNESS
89 year old female PMHx significant for Left invasive ductal breast ca s/p lumpectomy s/p chemo/xrt 2008, Hypertension, Hyperlipidemia, hypothyroidism due to Grave's disease s/p BARBER, BCC of the skin, community ambulator with assistive device (walker) was BIBA from home after after suffering from an unwitnessed mechanical fall with resultant severe (10/10 in intensity) left hip pain. Of note the patient was down for approximately one hour. The patient denies any associated prodrome of dizziness, palpitations, chest pain, or shortness of breath. Additionally the patient denies any head strike, loss of consciousness, or other injury to note. The patient is not currently taking any anticoagulation/antiplatelet therapy. At baseline the patient is able to perform her ADLs alothough requires assistive device to ambulate (walker).  89 year old female PMHx significant for Left invasive ductal breast ca s/p lumpectomy s/p chemo/xrt 2008, Hypertension, Hyperlipidemia, hypothyroidism due to Grave's disease s/p BARBER, BCC of the skin, community ambulator with assistive device (walker) was BIBA from home after suffering from an unwitnessed mechanical fall with resultant severe (10/10 in intensity) left hip pain. Of note the patient was down for approximately one hour. The patient denies any associated prodrome of dizziness, palpitations, chest pain, or shortness of breath. Additionally the patient denies any head strike, loss of consciousness, or other injury to note. The patient is not currently taking any anticoagulation/antiplatelet therapy. At baseline the patient is able to perform her ADLs although requires assistive device to ambulate (walker).

## 2023-08-26 NOTE — ED PROVIDER NOTE - CADM POA CENTRAL LINE
27 yo F with no reported pmhx presents with R sided facial swelling/pain and r ear pain. Pt states she was seen by her md yesterday and was told she has an impacted wisdom tooth on the lower R side.  Has been taking OTC medications for the pain without relief. Pt denies fevers/chills, sore throat, neck pain, traumas. VSS afebrile on arrival.
No

## 2023-08-26 NOTE — H&P ADULT - NSICDXFAMILYHX_GEN_ALL_CORE_FT
FAMILY HISTORY:  No pertinent family history in first degree relatives     FAMILY HISTORY:  Mother  Still living? Unknown  Family hx of colon cancer, Age at diagnosis: Age Unknown    Sibling  Still living? Unknown  Family hx of colon cancer, Age at diagnosis: Age Unknown    Child  Still living? Unknown  Family history of breast cancer, Age at diagnosis: Age Unknown

## 2023-08-26 NOTE — H&P ADULT - NSHPPHYSICALEXAM_GEN_ALL_CORE
Vital Signs Last 24 Hrs  T(C): 36.7 (26 Aug 2023 22:27), Max: 36.7 (26 Aug 2023 22:27)  T(F): 98.1 (26 Aug 2023 22:27), Max: 98.1 (26 Aug 2023 22:27)  HR: 62 (26 Aug 2023 22:27) (61 - 74)  BP: 141/64 (26 Aug 2023 22:27) (138/68 - 164/76)  RR: 18 (26 Aug 2023 22:27) (17 - 18)  SpO2: 96% (26 Aug 2023 22:27) (95% - 96%)    Parameters below as of 26 Aug 2023 22:27  Patient On (Oxygen Delivery Method): room air

## 2023-08-27 ENCOUNTER — TRANSCRIPTION ENCOUNTER (OUTPATIENT)
Age: 88
End: 2023-08-27

## 2023-08-27 DIAGNOSIS — Z98.49 CATARACT EXTRACTION STATUS, UNSPECIFIED EYE: Chronic | ICD-10-CM

## 2023-08-27 DIAGNOSIS — Z98.890 OTHER SPECIFIED POSTPROCEDURAL STATES: Chronic | ICD-10-CM

## 2023-08-27 DIAGNOSIS — Z90.89 ACQUIRED ABSENCE OF OTHER ORGANS: Chronic | ICD-10-CM

## 2023-08-27 LAB
24R-OH-CALCIDIOL SERPL-MCNC: 24.6 NG/ML — LOW (ref 30–80)
ALBUMIN SERPL ELPH-MCNC: 3.3 G/DL — SIGNIFICANT CHANGE UP (ref 3.3–5)
ANION GAP SERPL CALC-SCNC: 0 MMOL/L — LOW (ref 5–17)
ANION GAP SERPL CALC-SCNC: 2 MMOL/L — LOW (ref 5–17)
APTT BLD: 27 SEC — SIGNIFICANT CHANGE UP (ref 24.5–35.6)
BUN SERPL-MCNC: 11 MG/DL — SIGNIFICANT CHANGE UP (ref 7–23)
BUN SERPL-MCNC: 14 MG/DL — SIGNIFICANT CHANGE UP (ref 7–23)
CALCIUM SERPL-MCNC: 8.5 MG/DL — SIGNIFICANT CHANGE UP (ref 8.5–10.1)
CALCIUM SERPL-MCNC: 8.8 MG/DL — SIGNIFICANT CHANGE UP (ref 8.5–10.1)
CHLORIDE SERPL-SCNC: 112 MMOL/L — HIGH (ref 96–108)
CHLORIDE SERPL-SCNC: 113 MMOL/L — HIGH (ref 96–108)
CO2 SERPL-SCNC: 24 MMOL/L — SIGNIFICANT CHANGE UP (ref 22–31)
CO2 SERPL-SCNC: 27 MMOL/L — SIGNIFICANT CHANGE UP (ref 22–31)
CREAT SERPL-MCNC: 0.71 MG/DL — SIGNIFICANT CHANGE UP (ref 0.5–1.3)
CREAT SERPL-MCNC: 0.76 MG/DL — SIGNIFICANT CHANGE UP (ref 0.5–1.3)
EGFR: 74 ML/MIN/1.73M2 — SIGNIFICANT CHANGE UP
EGFR: 80 ML/MIN/1.73M2 — SIGNIFICANT CHANGE UP
GLUCOSE SERPL-MCNC: 107 MG/DL — HIGH (ref 70–99)
GLUCOSE SERPL-MCNC: 108 MG/DL — HIGH (ref 70–99)
HCT VFR BLD CALC: 32.2 % — LOW (ref 34.5–45)
HCT VFR BLD CALC: 33.6 % — LOW (ref 34.5–45)
HGB BLD-MCNC: 10.8 G/DL — LOW (ref 11.5–15.5)
HGB BLD-MCNC: 11.2 G/DL — LOW (ref 11.5–15.5)
INR BLD: 1.07 RATIO — SIGNIFICANT CHANGE UP (ref 0.85–1.18)
MAGNESIUM SERPL-MCNC: 2.2 MG/DL — SIGNIFICANT CHANGE UP (ref 1.6–2.6)
MCHC RBC-ENTMCNC: 31.5 PG — SIGNIFICANT CHANGE UP (ref 27–34)
MCHC RBC-ENTMCNC: 32.2 PG — SIGNIFICANT CHANGE UP (ref 27–34)
MCHC RBC-ENTMCNC: 33.3 GM/DL — SIGNIFICANT CHANGE UP (ref 32–36)
MCHC RBC-ENTMCNC: 33.5 GM/DL — SIGNIFICANT CHANGE UP (ref 32–36)
MCV RBC AUTO: 94.6 FL — SIGNIFICANT CHANGE UP (ref 80–100)
MCV RBC AUTO: 96.1 FL — SIGNIFICANT CHANGE UP (ref 80–100)
PLATELET # BLD AUTO: 155 K/UL — SIGNIFICANT CHANGE UP (ref 150–400)
PLATELET # BLD AUTO: 171 K/UL — SIGNIFICANT CHANGE UP (ref 150–400)
POTASSIUM SERPL-MCNC: 4 MMOL/L — SIGNIFICANT CHANGE UP (ref 3.5–5.3)
POTASSIUM SERPL-MCNC: 4 MMOL/L — SIGNIFICANT CHANGE UP (ref 3.5–5.3)
POTASSIUM SERPL-SCNC: 4 MMOL/L — SIGNIFICANT CHANGE UP (ref 3.5–5.3)
POTASSIUM SERPL-SCNC: 4 MMOL/L — SIGNIFICANT CHANGE UP (ref 3.5–5.3)
PROTHROM AB SERPL-ACNC: 12.1 SEC — SIGNIFICANT CHANGE UP (ref 9.5–13)
RBC # BLD: 3.35 M/UL — LOW (ref 3.8–5.2)
RBC # BLD: 3.55 M/UL — LOW (ref 3.8–5.2)
RBC # FLD: 14.4 % — SIGNIFICANT CHANGE UP (ref 10.3–14.5)
RBC # FLD: 14.6 % — HIGH (ref 10.3–14.5)
SODIUM SERPL-SCNC: 139 MMOL/L — SIGNIFICANT CHANGE UP (ref 135–145)
SODIUM SERPL-SCNC: 139 MMOL/L — SIGNIFICANT CHANGE UP (ref 135–145)
WBC # BLD: 12.03 K/UL — HIGH (ref 3.8–10.5)
WBC # BLD: 19.12 K/UL — HIGH (ref 3.8–10.5)
WBC # FLD AUTO: 12.03 K/UL — HIGH (ref 3.8–10.5)
WBC # FLD AUTO: 19.12 K/UL — HIGH (ref 3.8–10.5)

## 2023-08-27 PROCEDURE — 99232 SBSQ HOSP IP/OBS MODERATE 35: CPT

## 2023-08-27 RX ORDER — HYDROMORPHONE HYDROCHLORIDE 2 MG/ML
0.5 INJECTION INTRAMUSCULAR; INTRAVENOUS; SUBCUTANEOUS
Refills: 0 | Status: DISCONTINUED | OUTPATIENT
Start: 2023-08-27 | End: 2023-08-27

## 2023-08-27 RX ORDER — CEFAZOLIN SODIUM 1 G
2000 VIAL (EA) INJECTION EVERY 8 HOURS
Refills: 0 | Status: COMPLETED | OUTPATIENT
Start: 2023-08-27 | End: 2023-08-28

## 2023-08-27 RX ORDER — LANOLIN ALCOHOL/MO/W.PET/CERES
3 CREAM (GRAM) TOPICAL AT BEDTIME
Refills: 0 | Status: DISCONTINUED | OUTPATIENT
Start: 2023-08-27 | End: 2023-08-30

## 2023-08-27 RX ORDER — OXYCODONE HYDROCHLORIDE 5 MG/1
5 TABLET ORAL
Refills: 0 | Status: DISCONTINUED | OUTPATIENT
Start: 2023-08-27 | End: 2023-08-30

## 2023-08-27 RX ORDER — SODIUM CHLORIDE 9 MG/ML
1000 INJECTION, SOLUTION INTRAVENOUS
Refills: 0 | Status: DISCONTINUED | OUTPATIENT
Start: 2023-08-27 | End: 2023-08-27

## 2023-08-27 RX ORDER — ENOXAPARIN SODIUM 100 MG/ML
40 INJECTION SUBCUTANEOUS EVERY 24 HOURS
Refills: 0 | Status: DISCONTINUED | OUTPATIENT
Start: 2023-08-28 | End: 2023-08-30

## 2023-08-27 RX ORDER — HYDROMORPHONE HYDROCHLORIDE 2 MG/ML
0.5 INJECTION INTRAMUSCULAR; INTRAVENOUS; SUBCUTANEOUS
Refills: 0 | Status: DISCONTINUED | OUTPATIENT
Start: 2023-08-27 | End: 2023-08-30

## 2023-08-27 RX ORDER — ONDANSETRON 8 MG/1
4 TABLET, FILM COATED ORAL EVERY 6 HOURS
Refills: 0 | Status: DISCONTINUED | OUTPATIENT
Start: 2023-08-27 | End: 2023-08-30

## 2023-08-27 RX ORDER — ONDANSETRON 8 MG/1
4 TABLET, FILM COATED ORAL ONCE
Refills: 0 | Status: DISCONTINUED | OUTPATIENT
Start: 2023-08-27 | End: 2023-08-27

## 2023-08-27 RX ORDER — TRAMADOL HYDROCHLORIDE 50 MG/1
25 TABLET ORAL EVERY 6 HOURS
Refills: 0 | Status: DISCONTINUED | OUTPATIENT
Start: 2023-08-27 | End: 2023-08-30

## 2023-08-27 RX ORDER — ACETAMINOPHEN 500 MG
1000 TABLET ORAL EVERY 8 HOURS
Refills: 0 | Status: DISCONTINUED | OUTPATIENT
Start: 2023-08-27 | End: 2023-08-27

## 2023-08-27 RX ORDER — OXYCODONE HYDROCHLORIDE 5 MG/1
2.5 TABLET ORAL
Refills: 0 | Status: DISCONTINUED | OUTPATIENT
Start: 2023-08-27 | End: 2023-08-30

## 2023-08-27 RX ORDER — SODIUM CHLORIDE 9 MG/ML
1000 INJECTION INTRAMUSCULAR; INTRAVENOUS; SUBCUTANEOUS
Refills: 0 | Status: DISCONTINUED | OUTPATIENT
Start: 2023-08-27 | End: 2023-08-29

## 2023-08-27 RX ORDER — SENNA PLUS 8.6 MG/1
2 TABLET ORAL AT BEDTIME
Refills: 0 | Status: DISCONTINUED | OUTPATIENT
Start: 2023-08-27 | End: 2023-08-27

## 2023-08-27 RX ORDER — POLYETHYLENE GLYCOL 3350 17 G/17G
17 POWDER, FOR SOLUTION ORAL AT BEDTIME
Refills: 0 | Status: DISCONTINUED | OUTPATIENT
Start: 2023-08-27 | End: 2023-08-30

## 2023-08-27 RX ORDER — CHOLECALCIFEROL (VITAMIN D3) 125 MCG
400 CAPSULE ORAL DAILY
Refills: 0 | Status: DISCONTINUED | OUTPATIENT
Start: 2023-08-27 | End: 2023-08-30

## 2023-08-27 RX ORDER — SODIUM CHLORIDE 9 MG/ML
1000 INJECTION, SOLUTION INTRAVENOUS
Refills: 0 | Status: DISCONTINUED | OUTPATIENT
Start: 2023-08-28 | End: 2023-08-30

## 2023-08-27 RX ORDER — MAGNESIUM HYDROXIDE 400 MG/1
30 TABLET, CHEWABLE ORAL DAILY
Refills: 0 | Status: DISCONTINUED | OUTPATIENT
Start: 2023-08-27 | End: 2023-08-30

## 2023-08-27 RX ADMIN — Medication 1000 MILLIGRAM(S): at 21:42

## 2023-08-27 RX ADMIN — SODIUM CHLORIDE 75 MILLILITER(S): 9 INJECTION INTRAMUSCULAR; INTRAVENOUS; SUBCUTANEOUS at 08:17

## 2023-08-27 RX ADMIN — Medication 1000 MILLIGRAM(S): at 06:32

## 2023-08-27 RX ADMIN — MORPHINE SULFATE 2 MILLIGRAM(S): 50 CAPSULE, EXTENDED RELEASE ORAL at 00:11

## 2023-08-27 RX ADMIN — Medication 1000 MILLIGRAM(S): at 06:27

## 2023-08-27 RX ADMIN — Medication 100 MILLIGRAM(S): at 09:42

## 2023-08-27 RX ADMIN — Medication 112 MICROGRAM(S): at 06:27

## 2023-08-27 RX ADMIN — HEPARIN SODIUM 5000 UNIT(S): 5000 INJECTION INTRAVENOUS; SUBCUTANEOUS at 00:03

## 2023-08-27 RX ADMIN — ATORVASTATIN CALCIUM 10 MILLIGRAM(S): 80 TABLET, FILM COATED ORAL at 00:04

## 2023-08-27 RX ADMIN — MORPHINE SULFATE 2 MILLIGRAM(S): 50 CAPSULE, EXTENDED RELEASE ORAL at 00:26

## 2023-08-27 RX ADMIN — Medication 1000 MILLIGRAM(S): at 14:42

## 2023-08-27 RX ADMIN — GABAPENTIN 300 MILLIGRAM(S): 400 CAPSULE ORAL at 21:41

## 2023-08-27 RX ADMIN — GABAPENTIN 300 MILLIGRAM(S): 400 CAPSULE ORAL at 06:28

## 2023-08-27 RX ADMIN — SENNA PLUS 2 TABLET(S): 8.6 TABLET ORAL at 21:41

## 2023-08-27 RX ADMIN — ATORVASTATIN CALCIUM 10 MILLIGRAM(S): 80 TABLET, FILM COATED ORAL at 21:42

## 2023-08-27 RX ADMIN — Medication 100 MILLIGRAM(S): at 21:42

## 2023-08-27 RX ADMIN — Medication 1000 MILLIGRAM(S): at 14:39

## 2023-08-27 RX ADMIN — Medication 3 MILLIGRAM(S): at 21:42

## 2023-08-27 NOTE — DISCHARGE NOTE PROVIDER - NSDCFUADDINST_GEN_ALL_CORE_FT
IM Nail DC Instructions:    1. ACTIVITY: Weight bearing as tolerated with assistive devices (i.e. cane/walker).  2. DVT/PE Prophylaxis: Continue anticoagulation medication per AC team. See Med Rec for duration and dose.  3. PHYSICAL THERAPY: You should receive physical therapy daily.   4. STAPLES: Your staples are to be removed on post-operative day #14   5. BANDAGE: Change bandage on POD7 to dry gauze and tegaderm or paper tape. Can also change PRN if saturated. Do NOT remove on arrival to inspect wound.  6. FOLLOW UP: Follow up outpatient with your Orthopedic Surgeon Dr. Kramer in 1 month. Perform portable xray of extremity at Rehab postoperative day #14 before follow up.         Discharge Instructions for Left Hip IMN:    1. PAIN CONTROL: See Med Rec.  2. ACTIVITY: Weight Bearing as Tolerated with assistance and rolling walker  3. PT: daily  4. DVT/PE PROPHYLAXIS: Continue DVT/PE Prophylaxis. See Med Rec for Duration and dose.  5. BANDAGE: Change dressing to a new Mepilex Ag bandage POD7 (9/3/23). May change sooner if dressing saturated or falling off. DO NOT REMOVE BANDAGE TO CHECK WOUND ON INTAKE.  6. STAPLES: RN Remove Staples POD14 (9/10/23).  7. SHOWER: Okay to shower. Do not soak, submerge or let shower stream beat on dressing/wound.  8. FOLLOW UP: Follow-up with Dr. Kramer in 1 month. Call office for appointment. Please perform portable x-rays of left hip and femur at Rehab POD 14 (9/10/23) before follow up.

## 2023-08-27 NOTE — PROGRESS NOTE ADULT - SUBJECTIVE AND OBJECTIVE BOX
Patient seen and examined at bedside. Pain moderately controlled with medication. Patient denies any numbness, tingling, weakness, or any other orthopaedic complaint.     LABS:                        11.2   12.03 )-----------( 171      ( 27 Aug 2023 04:03 )             33.6     08-27    139  |  112<H>  |  14  ----------------------------<  107<H>  4.0   |  27  |  0.76    Ca    8.8      27 Aug 2023 04:03  Mg     2.2     08-27    TPro  x   /  Alb  3.3  /  TBili  x   /  DBili  x   /  AST  x   /  ALT  x   /  AlkPhos  x   08-27    PT/INR - ( 27 Aug 2023 04:03 )   PT: 12.1 sec;   INR: 1.07 ratio         PTT - ( 27 Aug 2023 04:03 )  PTT:27.0 sec  Urinalysis Basic - ( 27 Aug 2023 04:03 )    Color: x / Appearance: x / SG: x / pH: x  Gluc: 107 mg/dL / Ketone: x  / Bili: x / Urobili: x   Blood: x / Protein: x / Nitrite: x   Leuk Esterase: x / RBC: x / WBC x   Sq Epi: x / Non Sq Epi: x / Bacteria: x        VITAL SIGNS:  T(C): 36.7 (08-27-23 @ 00:07), Max: 36.7 (08-26-23 @ 22:27)  HR: 65 (08-27-23 @ 00:07) (61 - 74)  BP: 130/56 (08-27-23 @ 00:07) (130/56 - 164/76)  RR: 18 (08-27-23 @ 00:07) (17 - 18)  SpO2: 96% (08-27-23 @ 00:07) (95% - 96%)    Gen: Resting in bed, NAD  L LE:   Skin intact. No edema, erythema, or lesions throughout the extremity. Extremity is shortened and externally rotated.   TTP over the hip/groin. Pain with AROM/PROM of the hip. NTTP throughout the rest of the extremity.   SILT L2-S1  GSC/TA/EHL/FHL intact; Q/H unable to assess 2/2 pain.   DP pulse palpable.   No calf tenderness bilaterally.   Compartments soft and compressible.     Assessment and Plan  89y Female with L hip fracture    Imaging findings reviewed and discussed with the patient and patient's daughter at bedside. Need for operative intervention discussed.   Plan for OR for operative fixation of hip fracture - please document medical clearance.   NWB, strict bedrest  NPO except meds, hold anticoagulation  Preoperative labs, imaging  Dispo: pending OR for fixation of hip fracture this AM  Will discuss plan with Dr. Kramer and will advise of any changes.

## 2023-08-27 NOTE — DISCHARGE NOTE PROVIDER - NSDCCPCAREPLAN_GEN_ALL_CORE_FT
PRINCIPAL DISCHARGE DIAGNOSIS  Diagnosis: Closed fracture of left hip  Assessment and Plan of Treatment:      PRINCIPAL DISCHARGE DIAGNOSIS  Diagnosis: Closed fracture of left hip  Assessment and Plan of Treatment: physical therapy at rehab.   follow up with ortho as advised  see post op ortho instructions.

## 2023-08-27 NOTE — DISCHARGE NOTE PROVIDER - NSDCMRMEDTOKEN_GEN_ALL_CORE_FT
atorvastatin 10 mg oral tablet: 1 tab(s) orally once a day (at bedtime)  gabapentin 300 mg oral capsule: 1 cap(s) orally 3 times a day  losartan 100 mg oral tablet: 1 tab(s) orally once a day  metoprolol succinate 100 mg oral tablet, extended release: 1 tab(s) orally once a day  Synthroid 112 mcg (0.112 mg) oral tablet: 1 tab(s) orally once a day   acetaminophen 500 mg oral tablet: 2 orally every 8 hours as needed for  mild pain  atorvastatin 10 mg oral tablet: 1 tab(s) orally once a day (at bedtime)  cholecalciferol oral tablet: 400 unit(s) orally once a day  enoxaparin: 40 milligram(s) subcutaneous once a day last date sept 25th to complete 4 weeks for dvt prophylaxis.  gabapentin 300 mg oral capsule: 1 cap(s) orally 3 times a day  losartan 100 mg oral tablet: 1 tab(s) orally once a day  metoprolol succinate 100 mg oral tablet, extended release: 1 tab(s) orally once a day  polyethylene glycol 3350 oral powder for reconstitution: 17 gram(s) orally once a day  senna leaf extract oral tablet: 2 tab(s) orally once a day (at bedtime)  Synthroid 112 mcg (0.112 mg) oral tablet: 1 tab(s) orally once a day  traMADol 50 mg oral tablet: 1 orally every 6 hours as needed for moderate to severe pain

## 2023-08-27 NOTE — DISCHARGE NOTE PROVIDER - NSDCCPTREATMENT_GEN_ALL_CORE_FT
PRINCIPAL PROCEDURE  Procedure: Insertion of locking intramedullary hardeep into left hip  Findings and Treatment:

## 2023-08-27 NOTE — DISCHARGE NOTE PROVIDER - HOSPITAL COURSE
received patient via EMS c/o numbness on her right side; patient is awake, alert oriented; not in distress; tearful; able to lift left arm but right arm is weak; both legs able to lift; slight facial droop on the right; able to stick her tongue out; able to raise both eyebrows; hooked on the monitor; transported to CT; blood drawn and sent to lab. Orthopedic Summary  H&P:  Pt is a 89y Female   PAST MEDICAL & SURGICAL HISTORY:  HTN (hypertension)      HLD (hyperlipidemia)      History of peripheral neuropathy      Breast cancer      Adult hypothyroidism      History of Graves' disease      Basal cell carcinoma (BCC)      History of cataract surgery      History of tonsillectomy      S/P dilation and curettage      Status post left breast lumpectomy            Now s/p Left Hip IM Nail for fracture. Pt is afebrile with stable vital signs. Pain is controlled. Exam reveals intact EHL FHL TA GS, +DP. Dressing is clean and dry.    Hospital Course:  Patient presented to [facility] ED after a fall, found to have a hip fracture, and admitted to the Medical Service. Pt was medically cleared prior to surgery. Prophylactic antibiotics were started before the procedure and continued for 24 hours. They were admitted after surgery to the orthopedic floor. There were no orthopedic complications during the hospital stay. All home medications were continued.    Routine consults were obtained from the Anticoagulation Team for DVT/PE prophylaxis, from Physical Therapy, and followed by Medicine for Co-management. Patient was placed on  anticoagulation.  Pertinent home medications were continued.  Daily labs were followed.      On POD 0 there were no major issues. Pt received PT daily and was Discharged once cleared per Medicine.  The orthopedic Attending is aware and agrees. See addendum to DC summary per medical team below for any additional info or if any changes. 89F,  PMHx significant for Left invasive ductal breast ca s/p lumpectomy s/p chemo/xrt 2008, Hypertension, Hyperlipidemia, hypothyroidism due to Grave's disease s/p BARBER, BCC of the skin, community ambulator with assistive device (walker) was BIBA from home after suffering from an unwitnessed mechanical fall with resultant severe (10/10 in intensity) left hip pain. She was admitted with a left hip fracture. Underwent s/p IM nail 8/27. Post op required 1 unit prbcs.   She was seen by physical therapy and has opted for acute rehab.   She is medically stable for dc today. Of note her arb has been on hold. this will be resumed at Rehab if bp can tolerate.     for physical exam please see progress note from 8/30/23    LABS:                        8.8    10.89 )-----------( 146      ( 30 Aug 2023 06:49 )             26.0     08-30    139  |  111<H>  |  30<H>  ----------------------------<  108<H>  4.2   |  25  |  0.78    Ca    8.5      30 Aug 2023 06:49    CT Pelvis Bony Only No Cont (08.26.23 @ 19:57) >  IMPRESSION:  Acute impacted left femoral intertrochanteric fracture as described. If   there is continued clinical suspicion, MRI may be obtained for further   evaluation.    FINAL DIAGNOSIS:    #Mechanical fall/left hip fracture:  -s/p im nail pod#3  #Acute blood loss anemia S/P 1 UNIT PRBCS.  #Vit D insufficiency:  #Hypothyroidism  #Hypertension  #Hyperlipidemia  #Peripheral Neuropathy    time taken for dc 41 min  d/w pt  summary to be faxed to pcp

## 2023-08-27 NOTE — DISCHARGE NOTE PROVIDER - CARE PROVIDER_API CALL
Gurdeep Kramer [place]  Orthopaedic Surgery  [address] [place] [id number]  Phone: [phone]  Fax: [phone]  Follow Up Time:

## 2023-08-27 NOTE — PROGRESS NOTE ADULT - SUBJECTIVE AND OBJECTIVE BOX
Postop s/p L IMN for L IT fx    Pt seen at bedside. No acute complaints, pain is well managed. Denies numbness, tingling, fevers, chills, CP, SOB, N/V/D.    Vital Signs (24 Hrs):  T(C): 36.8 (08-27-23 @ 18:00), Max: 36.9 (08-27-23 @ 07:30)  HR: 63 (08-27-23 @ 18:00) (61 - 69)  BP: 146/79 (08-27-23 @ 18:00) (120/64 - 152/67)  RR: 15 (08-27-23 @ 18:00) (14 - 19)  SpO2: 97% (08-27-23 @ 18:00) (93% - 99%)  Wt(kg): --    LABS:                          11.2   12.03 )-----------( 171      ( 27 Aug 2023 04:03 )             33.6     08-27    139  |  112<H>  |  14  ----------------------------<  107<H>  4.0   |  27  |  0.76    Ca    8.8      27 Aug 2023 04:03  Mg     2.2     08-27    TPro  x   /  Alb  3.3  /  TBili  x   /  DBili  x   /  AST  x   /  ALT  x   /  AlkPhos  x   08-27    LIVER FUNCTIONS - ( 27 Aug 2023 04:03 )  Alb: 3.3 g/dL / Pro: x     / ALK PHOS: x     / ALT: x     / AST: x     / GGT: x           PT/INR - ( 27 Aug 2023 04:03 )   PT: 12.1 sec;   INR: 1.07 ratio         PTT - ( 27 Aug 2023 04:03 )  PTT:27.0 sec    Physical Exam:  General: NAD, pt laying in bed comfortably  SCDs in place BL    LLE:  Compartments soft, compressible  Calves nontender  Motor: +GSC, TA, EHL, FHL  SILT: SPN, DPN, Tib, Saph, Deana  +DP    A/P:    89F POD0 L IMN    WBAT  PT/OT  Follow up AM labs  Analgesics  DVT PPx per primary team, POD1 Lovenox  Incentive spirometry  Dispo: pending PT eval  Will discuss plan with Dr. Kramer and notify primary team of any changes to plan

## 2023-08-27 NOTE — PROGRESS NOTE ADULT - SUBJECTIVE AND OBJECTIVE BOX
c/c: fall/left hip pain    HPI:  89 year old female PMHx significant for Left invasive ductal breast ca s/p lumpectomy s/p chemo/xrt 2008, Hypertension, Hyperlipidemia, hypothyroidism due to Grave's disease s/p BARBER, BCC of the skin, community ambulator with assistive device (walker) was BIBA from home after suffering from an unwitnessed mechanical fall with resultant severe (10/10 in intensity) left hip pain. Of note the patient was down for approximately one hour. The patient denies any associated prodrome of dizziness, palpitations, chest pain, or shortness of breath. Additionally the patient denies any head strike, loss of consciousness, or other injury to note. The patient is not currently taking any anticoagulation/antiplatelet therapy. At baseline the patient is able to perform her ADLs although requires assistive device to ambulate (walker).    She is admitted with a left hip fracture.     pt seen and examined this am. doing well. Minimal pain at rest. no sob/chest pain. NPO for OR today.       Review of system- All 10 systems reviewed and is as per HPI otherwise negative.     VITALS  T(C): 36.9 (08-27-23 @ 07:30), Max: 36.9 (08-27-23 @ 07:30)  HR: 64 (08-27-23 @ 07:30) (61 - 65)  BP: 128/54 (08-27-23 @ 07:30) (128/54 - 141/64)  RR: 18 (08-27-23 @ 07:30) (17 - 18)  SpO2: 93% (08-27-23 @ 07:30) (93% - 96%)    PHYSICAL EXAM:    GENERAL: Comfortable, no acute distress  HEAD:  Atraumatic, Normocephalic  EYES: EOMI, PERRLA  HEENT: Moist mucous membranes  NECK: Supple, No JVD  NERVOUS SYSTEM:  Alert & Oriented X3, Motor Strength 5/5 B/L upper and lower extremities  CHEST/LUNG: Clear to auscultation bilaterally  HEART: Regular rate and rhythm; No murmurs, rubs, or gallops  ABDOMEN: Soft, Nontender, Nondistended; Bowel sounds present  GENITOURINARY- Voiding, no palpable bladder  EXTREMITIES:  No clubbing, cyanosis, or edema  MUSCULOSKELETAL- No muscle tenderness, No joint tenderness  SKIN-no rash        LABS:                        11.2   12.03 )-----------( 171      ( 27 Aug 2023 04:03 )             33.6     08-27    139  |  112<H>  |  14  ----------------------------<  107<H>  4.0   |  27  |  0.76    Ca    8.8      27 Aug 2023 04:03  Mg     2.2     08-27    TPro  x   /  Alb  3.3  /  TBili  x   /  DBili  x   /  AST  x   /  ALT  x   /  AlkPhos  x   08-27    PT/INR - ( 27 Aug 2023 04:03 )   PT: 12.1 sec;   INR: 1.07 ratio         PTT - ( 27 Aug 2023 04:03 )  PTT:27.0 sec  Urinalysis Basic - ( 27 Aug 2023 04:03 )    Color: x / Appearance: x / SG: x / pH: x  Gluc: 107 mg/dL / Ketone: x  / Bili: x / Urobili: x   Blood: x / Protein: x / Nitrite: x   Leuk Esterase: x / RBC: x / WBC x   Sq Epi: x / Non Sq Epi: x / Bacteria: x      CARDIAC MARKERS ( 26 Aug 2023 17:40 )  x     / x     / 169 U/L / x     / x            MEDS  acetaminophen     Tablet .. 1000 milliGRAM(s) Oral every 8 hours  atorvastatin 10 milliGRAM(s) Oral at bedtime  bisacodyl 5 milliGRAM(s) Oral daily PRN  gabapentin 300 milliGRAM(s) Oral three times a day  HYDROmorphone  Injectable 0.5 milliGRAM(s) IV Push every 10 minutes PRN  lactated ringers. 1000 milliLiter(s) IV Continuous <Continuous>  levothyroxine 112 MICROGram(s) Oral daily  metoprolol succinate  milliGRAM(s) Oral daily  morphine  - Injectable 2 milliGRAM(s) IV Push every 4 hours PRN  morphine  - Injectable 4 milliGRAM(s) IV Push every 4 hours PRN  naloxone Injectable 0.4 milliGRAM(s) IV Push once  ondansetron Injectable 4 milliGRAM(s) IV Push once PRN  polyethylene glycol 3350 17 Gram(s) Oral daily  senna 2 Tablet(s) Oral at bedtime  sodium chloride 0.9%. 1000 milliLiter(s) IV Continuous <Continuous>      ASSESSMENT AND PLAN    89f, PMH as above a/w    #Mechanical fall/left hip fracture:  -for OR today.   -pain control  -physical therapy post op  -incentive spirometry  -bowel regimen.    #Acute blood loss anemia:  -likely related to fracture.  -monitor h/h    #Vit D insufficiency:  -start supplement    #Hypothyroidism  -cont. Levothyroxine 112mcg po qam    #Hypertension  -continue bb  -resume arb if bp can tolerate post op    #Hyperlipidemia  -continue statin.     #Peripheral Neuropathy  -cont. Gabapentin 300mg po tid    #Vte ppx  -resume post op

## 2023-08-28 LAB
ANION GAP SERPL CALC-SCNC: 7 MMOL/L — SIGNIFICANT CHANGE UP (ref 5–17)
BUN SERPL-MCNC: 19 MG/DL — SIGNIFICANT CHANGE UP (ref 7–23)
CALCIUM SERPL-MCNC: 8.2 MG/DL — LOW (ref 8.5–10.1)
CHLORIDE SERPL-SCNC: 109 MMOL/L — HIGH (ref 96–108)
CO2 SERPL-SCNC: 23 MMOL/L — SIGNIFICANT CHANGE UP (ref 22–31)
CREAT SERPL-MCNC: 0.76 MG/DL — SIGNIFICANT CHANGE UP (ref 0.5–1.3)
EGFR: 74 ML/MIN/1.73M2 — SIGNIFICANT CHANGE UP
GLUCOSE SERPL-MCNC: 116 MG/DL — HIGH (ref 70–99)
HCT VFR BLD CALC: 28 % — LOW (ref 34.5–45)
HGB BLD-MCNC: 9.3 G/DL — LOW (ref 11.5–15.5)
MCHC RBC-ENTMCNC: 31.4 PG — SIGNIFICANT CHANGE UP (ref 27–34)
MCHC RBC-ENTMCNC: 33.2 GM/DL — SIGNIFICANT CHANGE UP (ref 32–36)
MCV RBC AUTO: 94.6 FL — SIGNIFICANT CHANGE UP (ref 80–100)
PLATELET # BLD AUTO: 110 K/UL — LOW (ref 150–400)
POTASSIUM SERPL-MCNC: 4.4 MMOL/L — SIGNIFICANT CHANGE UP (ref 3.5–5.3)
POTASSIUM SERPL-SCNC: 4.4 MMOL/L — SIGNIFICANT CHANGE UP (ref 3.5–5.3)
RBC # BLD: 2.96 M/UL — LOW (ref 3.8–5.2)
RBC # FLD: 14.5 % — SIGNIFICANT CHANGE UP (ref 10.3–14.5)
SODIUM SERPL-SCNC: 139 MMOL/L — SIGNIFICANT CHANGE UP (ref 135–145)
WBC # BLD: 11.97 K/UL — HIGH (ref 3.8–10.5)
WBC # FLD AUTO: 11.97 K/UL — HIGH (ref 3.8–10.5)

## 2023-08-28 PROCEDURE — 99232 SBSQ HOSP IP/OBS MODERATE 35: CPT

## 2023-08-28 RX ADMIN — ATORVASTATIN CALCIUM 10 MILLIGRAM(S): 80 TABLET, FILM COATED ORAL at 21:50

## 2023-08-28 RX ADMIN — Medication 400 UNIT(S): at 10:14

## 2023-08-28 RX ADMIN — Medication 1000 MILLIGRAM(S): at 05:59

## 2023-08-28 RX ADMIN — GABAPENTIN 300 MILLIGRAM(S): 400 CAPSULE ORAL at 06:00

## 2023-08-28 RX ADMIN — Medication 3 MILLIGRAM(S): at 21:50

## 2023-08-28 RX ADMIN — Medication 1000 MILLIGRAM(S): at 21:50

## 2023-08-28 RX ADMIN — ENOXAPARIN SODIUM 40 MILLIGRAM(S): 100 INJECTION SUBCUTANEOUS at 06:00

## 2023-08-28 RX ADMIN — POLYETHYLENE GLYCOL 3350 17 GRAM(S): 17 POWDER, FOR SOLUTION ORAL at 21:50

## 2023-08-28 RX ADMIN — TRAMADOL HYDROCHLORIDE 25 MILLIGRAM(S): 50 TABLET ORAL at 10:15

## 2023-08-28 RX ADMIN — TRAMADOL HYDROCHLORIDE 25 MILLIGRAM(S): 50 TABLET ORAL at 11:00

## 2023-08-28 RX ADMIN — GABAPENTIN 300 MILLIGRAM(S): 400 CAPSULE ORAL at 13:23

## 2023-08-28 RX ADMIN — Medication 1000 MILLIGRAM(S): at 13:23

## 2023-08-28 RX ADMIN — Medication 100 MILLIGRAM(S): at 05:15

## 2023-08-28 RX ADMIN — Medication 1000 MILLIGRAM(S): at 13:27

## 2023-08-28 RX ADMIN — Medication 112 MICROGRAM(S): at 05:59

## 2023-08-28 RX ADMIN — SENNA PLUS 2 TABLET(S): 8.6 TABLET ORAL at 21:50

## 2023-08-28 RX ADMIN — GABAPENTIN 300 MILLIGRAM(S): 400 CAPSULE ORAL at 21:50

## 2023-08-28 NOTE — OCCUPATIONAL THERAPY INITIAL EVALUATION ADULT - MD ORDER
"OT Evaluate and Treat"- MD orders received. Chart reviewed, contents noted, conferred with RN "OT Evaluate and Treat"- MD orders received. Chart reviewed, contents noted, conferred with RN Nazia TURNER, VS: semi-supine in bed- 88/66 L arm, 104/51 right arm, 94% room air, HR 68, notified RN of vitals, pt non-symptomatic t/o, okayed to trial sitting EOB and reassess vitals, 113/68 at EOB, okayed to be in chair, (108/70 R arm in chair).

## 2023-08-28 NOTE — OCCUPATIONAL THERAPY INITIAL EVALUATION ADULT - PRECAUTIONS/LIMITATIONS, REHAB EVAL
diet: regular, elevate HOB, notify if systolic >140 <90, HR >100 <50, 3L SpO2 <94%/aspiration precautions/cardiac precautions/fall precautions/surgical precautions

## 2023-08-28 NOTE — OCCUPATIONAL THERAPY INITIAL EVALUATION ADULT - ADDITIONAL COMMENTS
At baseline the patient is able to perform her ADLs although requires assistive device to ambulate (walker). Pt reports she resides in a ground level apartment with no steps. At baseline the patient is able to perform her ADLs (I), walks with RW, and needs help with some IADL tasks. Pt's  has a HHA and is not (I), the HHA also assists with cooking, cleaning, laundry, and household tasks for her. Pt has a tub/shower combo and a walk in shower stall, with a standard toilet. RHD, (-) , reports she has family local.

## 2023-08-28 NOTE — OCCUPATIONAL THERAPY INITIAL EVALUATION ADULT - NSACTIVITYREC_GEN_A_OT
Pt presents with impaired balance, endurance and muscle strength that will benefit from skilled OT to improve independence in ADLs, reduce fall risk and chance for readmission.

## 2023-08-28 NOTE — PHYSICAL THERAPY INITIAL EVALUATION ADULT - PATIENT/FAMILY/SIGNIFICANT OTHER GOALS STATEMENT, PT EVAL
The pt hopes to go to Acute Rehab upon discharge from , stating that she has her 70 year wedding anniversary coming up and she wants to be home for it.

## 2023-08-28 NOTE — PHYSICAL THERAPY INITIAL EVALUATION ADULT - GENERAL OBSERVATIONS, REHAB EVAL
The pt was received on 2N, sitting up at the EOB with OT. The pt agreed to participate with PT evaluation for therex and ambulation tx. The pt had 1 IV and bilateral SCDs in place.

## 2023-08-28 NOTE — OCCUPATIONAL THERAPY INITIAL EVALUATION ADULT - ADL RETRAINING, OT EVAL
Pt will perform toileting with moderate assist in 3-5 tx sessions. Pt will perform LE dressing and footwear management using AE prn with moderate assist in 3-5 tx sessions.

## 2023-08-28 NOTE — OCCUPATIONAL THERAPY INITIAL EVALUATION ADULT - NSOTDISCHREC_GEN_A_CORE
AIR (pt wants AIR, pt does not want CARLOS. If pt dc'd home pt would need HHA to assist for ADL's, IADL tasks, and physical assist for fxl transfers pending progress and home OT services)/Acute Inpatient Rehab

## 2023-08-28 NOTE — PROGRESS NOTE ADULT - SUBJECTIVE AND OBJECTIVE BOX
c/c: fall/left hip pain    HPI:  89 year old female PMHx significant for Left invasive ductal breast ca s/p lumpectomy s/p chemo/xrt 2008, Hypertension, Hyperlipidemia, hypothyroidism due to Grave's disease s/p BARBER, BCC of the skin, community ambulator with assistive device (walker) was BIBA from home after suffering from an unwitnessed mechanical fall with resultant severe (10/10 in intensity) left hip pain. Of note the patient was down for approximately one hour. The patient denies any associated prodrome of dizziness, palpitations, chest pain, or shortness of breath. Additionally the patient denies any head strike, loss of consciousness, or other injury to note. The patient is not currently taking any anticoagulation/antiplatelet therapy. At baseline the patient is able to perform her ADLs although requires assistive device to ambulate (walker).    She is admitted with a left hip fracture.   now s/p IM nail 8/27    pt seen and examined this am. Felt well. minimal pain at rest. no sob/chest pain. tolerating po.     ROS: all 10 systems reviewed and is as above otherwise negative.       Vital Signs Last 24 Hrs  T(C): 36.5 (28 Aug 2023 07:59), Max: 36.9 (27 Aug 2023 18:25)  T(F): 97.7 (28 Aug 2023 07:59), Max: 98.4 (27 Aug 2023 18:25)  HR: 62 (28 Aug 2023 07:59) (61 - 73)  BP: 106/51 (28 Aug 2023 07:59) (101/54 - 152/67)  BP(mean): 71 (27 Aug 2023 20:12) (71 - 71)  RR: 18 (28 Aug 2023 07:59) (14 - 19)  SpO2: 98% (28 Aug 2023 07:59) (93% - 99%)    Parameters below as of 28 Aug 2023 07:59  Patient On (Oxygen Delivery Method): nasal cannula  O2 Flow (L/min): 2    PHYSICAL EXAM:    GENERAL: Comfortable, no acute distress  HEAD:  Atraumatic, Normocephalic  EYES: EOMI, PERRLA  HEENT: Moist mucous membranes  NECK: Supple, No JVD  NERVOUS SYSTEM:  Alert & Oriented X3, Motor Strength 5/5 B/L upper and lower extremities  CHEST/LUNG: Clear to auscultation bilaterally  HEART: Regular rate and rhythm; No murmurs, rubs, or gallops  ABDOMEN: Soft, Nontender, Nondistended; Bowel sounds present  GENITOURINARY- Voiding, no palpable bladder  EXTREMITIES:  No clubbing, cyanosis, or edema  MUSCULOSKELETAL-Left hip dressing intact.  SKIN-no rash        LABS:                        9.3    11.97 )-----------( 110      ( 28 Aug 2023 07:37 )             28.0     08-28    139  |  109<H>  |  19  ----------------------------<  116<H>  4.4   |  23  |  0.76    Ca    8.2<L>      28 Aug 2023 07:37  Mg     2.2     08-27    TPro  x   /  Alb  3.3  /  TBili  x   /  DBili  x   /  AST  x   /  ALT  x   /  AlkPhos  x   08-27    PT/INR - ( 27 Aug 2023 04:03 )   PT: 12.1 sec;   INR: 1.07 ratio         PTT - ( 27 Aug 2023 04:03 )  PTT:27.0 sec  Urinalysis Basic - ( 28 Aug 2023 07:37 )    Color: x / Appearance: x / SG: x / pH: x  Gluc: 116 mg/dL / Ketone: x  / Bili: x / Urobili: x   Blood: x / Protein: x / Nitrite: x   Leuk Esterase: x / RBC: x / WBC x   Sq Epi: x / Non Sq Epi: x / Bacteria: x        MEDS  acetaminophen     Tablet .. 1000 milliGRAM(s) Oral every 8 hours  atorvastatin 10 milliGRAM(s) Oral at bedtime  bisacodyl 5 milliGRAM(s) Oral daily PRN  gabapentin 300 milliGRAM(s) Oral three times a day  HYDROmorphone  Injectable 0.5 milliGRAM(s) IV Push every 10 minutes PRN  lactated ringers. 1000 milliLiter(s) IV Continuous <Continuous>  levothyroxine 112 MICROGram(s) Oral daily  metoprolol succinate  milliGRAM(s) Oral daily  morphine  - Injectable 2 milliGRAM(s) IV Push every 4 hours PRN  morphine  - Injectable 4 milliGRAM(s) IV Push every 4 hours PRN  naloxone Injectable 0.4 milliGRAM(s) IV Push once  ondansetron Injectable 4 milliGRAM(s) IV Push once PRN  polyethylene glycol 3350 17 Gram(s) Oral daily  senna 2 Tablet(s) Oral at bedtime  sodium chloride 0.9%. 1000 milliLiter(s) IV Continuous <Continuous>      ASSESSMENT AND PLAN    89f, PMH as above a/w    #Mechanical fall/left hip fracture:  -s/p im nail pod#1  -pain control  -physical therapy post op  -incentive spirometry  -bowel regimen.    #Acute blood loss anemia:  -likely related to fracture/surgery  -repeat CBC in am.     #Vit D insufficiency:  -started supplement    #Hypothyroidism  -cont. Levothyroxine 112mcg po qam    #Hypertension  -continue bb  -arb on hold for borderline bp    #Hyperlipidemia  -continue statin.     #Peripheral Neuropathy  -cont. Gabapentin 300mg po tid    #Vte ppx  -lovenox

## 2023-08-28 NOTE — PHYSICAL THERAPY INITIAL EVALUATION ADULT - PERTINENT HX OF CURRENT PROBLEM, REHAB EVAL
as per Hospitalist HPI: 89 year old female PMHx significant for Left invasive ductal breast ca s/p lumpectomy s/p chemo/xrt 2008, Hypertension, Hyperlipidemia, hypothyroidism due to Grave's disease s/p BARBER, BCC of the skin, community ambulator with assistive device (walker) was BIBA from home after suffering from an unwitnessed mechanical fall with resultant severe (10/10 in intensity) left hip pain. Of note the patient was down for approximately one hour. The patient denies any associated prodrome of dizziness, palpitations, chest pain, or shortness of breath. Additionally the patient denies any head strike, loss of consciousness, or other injury to note. The patient is not currently taking any anticoagulation/antiplatelet therapy. At baseline the patient is able to perform her ADLs although requires assistive device to ambulate (walker).    She is admitted with a left hip fracture.

## 2023-08-28 NOTE — OCCUPATIONAL THERAPY INITIAL EVALUATION ADULT - PERTINENT HX OF CURRENT PROBLEM, REHAB EVAL
Pt is a 89 y/o female w/ a PMHx significant for Left invasive ductal breast CA s/p lumpectomy s/p chemo/xrt 2008, Hypertension, Hyperlipidemia, hypothyroidism due to Grave's disease s/p BARBER, BCC of the skin, community ambulator with assistive device (walker) was BIBA from home after suffering from an unwitnessed mechanical fall with resultant severe (10/10 in intensity) left hip pain. Of note the patient was down for approximately one hour. The patient denies any associated prodrome of dizziness, palpitations, chest pain, or shortness of breath. Additionally the patient denies any head strike, loss of consciousness, or other injury to note. The patient is not currently taking any anticoagulation/antiplatelet therapy.    She is admitted with a left femoral neck intertrochanteric hip fracture. Pt is now s/p L IMN.

## 2023-08-28 NOTE — PHYSICAL THERAPY INITIAL EVALUATION ADULT - ADDITIONAL COMMENTS
The pt reports having a home with no steps to negotiate. The pt's  has an aide at home for several hours who helps with some household chores including preparing meals, which assists the patient throughout her day, otherwise the pt reports that she was transferring and moving well with a RW and without anyone assisting her prior to admission.

## 2023-08-28 NOTE — PROGRESS NOTE ADULT - SUBJECTIVE AND OBJECTIVE BOX
Pt seen at bedside. No acute complaints, pain is well managed. Denies numbness, tingling, fevers, chills, CP, SOB, N/V/D.    LABS:                        10.8   19.12 )-----------( 155      ( 27 Aug 2023 17:51 )             32.2     08-27    139  |  113<H>  |  11  ----------------------------<  108<H>  4.0   |  24  |  0.71    Ca    8.5      27 Aug 2023 17:51  Mg     2.2     08-27    TPro  x   /  Alb  3.3  /  TBili  x   /  DBili  x   /  AST  x   /  ALT  x   /  AlkPhos  x   08-27    PT/INR - ( 27 Aug 2023 04:03 )   PT: 12.1 sec;   INR: 1.07 ratio         PTT - ( 27 Aug 2023 04:03 )  PTT:27.0 sec  Urinalysis Basic - ( 27 Aug 2023 17:51 )    Color: x / Appearance: x / SG: x / pH: x  Gluc: 108 mg/dL / Ketone: x  / Bili: x / Urobili: x   Blood: x / Protein: x / Nitrite: x   Leuk Esterase: x / RBC: x / WBC x   Sq Epi: x / Non Sq Epi: x / Bacteria: x        VITAL SIGNS:  T(C): 36.6 (08-28-23 @ 04:05), Max: 36.9 (08-27-23 @ 07:30)  HR: 61 (08-28-23 @ 04:05) (61 - 73)  BP: 101/54 (08-28-23 @ 04:05) (101/54 - 152/67)  RR: 17 (08-28-23 @ 04:05) (14 - 19)  SpO2: 99% (08-28-23 @ 04:05) (93% - 99%)    Physical Exam:  General: NAD, pt laying in bed comfortably  SCDs in place BL    LLE:  Compartments soft, compressible  Calves nontender  Motor: +GSC, TA, EHL, FHL  SILT: SPN, DPN, Tib, Saph, Deana  +DP    A/P:    89F POD1 s/p L IMN    WBAT  PT/OT  Follow up AM labs  Analgesics  DVT PPx: Lovenox  Incentive spirometry  Dispo: pending PT eval  Medical management appreciated  Will discuss plan w/ Dr. Kramer and change accordingly.

## 2023-08-28 NOTE — OCCUPATIONAL THERAPY INITIAL EVALUATION ADULT - MODALITIES TREATMENT COMMENTS
Pt left seated in bedside chair, chair alarmed, CB/TT/phone IR, LLE elevated on stool +pillow, needs met, in NAD.

## 2023-08-29 LAB
ANION GAP SERPL CALC-SCNC: 5 MMOL/L — SIGNIFICANT CHANGE UP (ref 5–17)
BASOPHILS # BLD AUTO: 0.08 K/UL — SIGNIFICANT CHANGE UP (ref 0–0.2)
BASOPHILS NFR BLD AUTO: 0.5 % — SIGNIFICANT CHANGE UP (ref 0–2)
BLD GP AB SCN SERPL QL: SIGNIFICANT CHANGE UP
BUN SERPL-MCNC: 37 MG/DL — HIGH (ref 7–23)
CALCIUM SERPL-MCNC: 8.5 MG/DL — SIGNIFICANT CHANGE UP (ref 8.5–10.1)
CHLORIDE SERPL-SCNC: 109 MMOL/L — HIGH (ref 96–108)
CO2 SERPL-SCNC: 25 MMOL/L — SIGNIFICANT CHANGE UP (ref 22–31)
CREAT SERPL-MCNC: 1.19 MG/DL — SIGNIFICANT CHANGE UP (ref 0.5–1.3)
EGFR: 43 ML/MIN/1.73M2 — LOW
EOSINOPHIL # BLD AUTO: 0.3 K/UL — SIGNIFICANT CHANGE UP (ref 0–0.5)
EOSINOPHIL NFR BLD AUTO: 1.7 % — SIGNIFICANT CHANGE UP (ref 0–6)
GLUCOSE SERPL-MCNC: 104 MG/DL — HIGH (ref 70–99)
HCT VFR BLD CALC: 24.8 % — LOW (ref 34.5–45)
HCT VFR BLD CALC: 25.5 % — LOW (ref 34.5–45)
HGB BLD-MCNC: 8.1 G/DL — LOW (ref 11.5–15.5)
HGB BLD-MCNC: 8.4 G/DL — LOW (ref 11.5–15.5)
IMM GRANULOCYTES NFR BLD AUTO: 0.6 % — SIGNIFICANT CHANGE UP (ref 0–0.9)
LYMPHOCYTES # BLD AUTO: 14.1 % — SIGNIFICANT CHANGE UP (ref 13–44)
LYMPHOCYTES # BLD AUTO: 2.43 K/UL — SIGNIFICANT CHANGE UP (ref 1–3.3)
MCHC RBC-ENTMCNC: 31.6 PG — SIGNIFICANT CHANGE UP (ref 27–34)
MCHC RBC-ENTMCNC: 31.7 PG — SIGNIFICANT CHANGE UP (ref 27–34)
MCHC RBC-ENTMCNC: 32.7 GM/DL — SIGNIFICANT CHANGE UP (ref 32–36)
MCHC RBC-ENTMCNC: 32.9 GM/DL — SIGNIFICANT CHANGE UP (ref 32–36)
MCV RBC AUTO: 96.2 FL — SIGNIFICANT CHANGE UP (ref 80–100)
MCV RBC AUTO: 96.9 FL — SIGNIFICANT CHANGE UP (ref 80–100)
MONOCYTES # BLD AUTO: 1.38 K/UL — HIGH (ref 0–0.9)
MONOCYTES NFR BLD AUTO: 8 % — SIGNIFICANT CHANGE UP (ref 2–14)
NEUTROPHILS # BLD AUTO: 12.94 K/UL — HIGH (ref 1.8–7.4)
NEUTROPHILS NFR BLD AUTO: 75.1 % — SIGNIFICANT CHANGE UP (ref 43–77)
PLATELET # BLD AUTO: 143 K/UL — LOW (ref 150–400)
PLATELET # BLD AUTO: 163 K/UL — SIGNIFICANT CHANGE UP (ref 150–400)
POTASSIUM SERPL-MCNC: 4 MMOL/L — SIGNIFICANT CHANGE UP (ref 3.5–5.3)
POTASSIUM SERPL-SCNC: 4 MMOL/L — SIGNIFICANT CHANGE UP (ref 3.5–5.3)
RBC # BLD: 2.56 M/UL — LOW (ref 3.8–5.2)
RBC # BLD: 2.65 M/UL — LOW (ref 3.8–5.2)
RBC # FLD: 14.9 % — HIGH (ref 10.3–14.5)
RBC # FLD: 15 % — HIGH (ref 10.3–14.5)
SODIUM SERPL-SCNC: 139 MMOL/L — SIGNIFICANT CHANGE UP (ref 135–145)
WBC # BLD: 13.85 K/UL — HIGH (ref 3.8–10.5)
WBC # BLD: 17.24 K/UL — HIGH (ref 3.8–10.5)
WBC # FLD AUTO: 13.85 K/UL — HIGH (ref 3.8–10.5)
WBC # FLD AUTO: 17.24 K/UL — HIGH (ref 3.8–10.5)

## 2023-08-29 PROCEDURE — 99221 1ST HOSP IP/OBS SF/LOW 40: CPT

## 2023-08-29 PROCEDURE — 99232 SBSQ HOSP IP/OBS MODERATE 35: CPT

## 2023-08-29 RX ADMIN — Medication 3 MILLIGRAM(S): at 21:57

## 2023-08-29 RX ADMIN — Medication 1000 MILLIGRAM(S): at 14:38

## 2023-08-29 RX ADMIN — Medication 1000 MILLIGRAM(S): at 22:10

## 2023-08-29 RX ADMIN — Medication 1000 MILLIGRAM(S): at 21:57

## 2023-08-29 RX ADMIN — GABAPENTIN 300 MILLIGRAM(S): 400 CAPSULE ORAL at 06:59

## 2023-08-29 RX ADMIN — Medication 400 UNIT(S): at 09:21

## 2023-08-29 RX ADMIN — ENOXAPARIN SODIUM 40 MILLIGRAM(S): 100 INJECTION SUBCUTANEOUS at 06:59

## 2023-08-29 RX ADMIN — Medication 112 MICROGRAM(S): at 06:59

## 2023-08-29 RX ADMIN — GABAPENTIN 300 MILLIGRAM(S): 400 CAPSULE ORAL at 21:58

## 2023-08-29 RX ADMIN — Medication 1000 MILLIGRAM(S): at 06:59

## 2023-08-29 RX ADMIN — ATORVASTATIN CALCIUM 10 MILLIGRAM(S): 80 TABLET, FILM COATED ORAL at 21:59

## 2023-08-29 RX ADMIN — GABAPENTIN 300 MILLIGRAM(S): 400 CAPSULE ORAL at 14:38

## 2023-08-29 RX ADMIN — POLYETHYLENE GLYCOL 3350 17 GRAM(S): 17 POWDER, FOR SOLUTION ORAL at 21:57

## 2023-08-29 RX ADMIN — SENNA PLUS 2 TABLET(S): 8.6 TABLET ORAL at 21:57

## 2023-08-29 NOTE — CONSULT NOTE ADULT - SUBJECTIVE AND OBJECTIVE BOX
89yF was admitted on 08-26    Patient is a 89y old  Female who presents with a chief complaint of Falls in the elderly with resultant severe left hip pain (29 Aug 2023 06:19)    HPI:  89 year old female PMHx significant for Left invasive ductal breast ca s/p lumpectomy s/p chemo/xrt 2008, Hypertension, Hyperlipidemia, hypothyroidism due to Grave's disease s/p BARBER, BCC of the skin, community ambulator with assistive device (walker) was BIBA from home after suffering from an unwitnessed mechanical fall with resultant severe (10/10 in intensity) left hip pain. Of note the patient was down for approximately one hour. The patient denies any associated prodrome of dizziness, palpitations, chest pain, or shortness of breath. Additionally the patient denies any head strike, loss of consciousness, or other injury to note. The patient is not currently taking any anticoagulation/antiplatelet therapy. At baseline the patient is able to perform her ADLs although requires assistive device to ambulate (walker).  (26 Aug 2023 22:36)    Patient underwent an Left hip IMN 08/27/2023. WBAT    Imaging performed:  Xray of the left hip  8/26/2023 reveals Intertrochanteric fracture left hip.   CT Pelvis 8.26.2023 Acute impacted left femoral intertrochanteric fracture as described. If there is continued clinical suspicion, MRI may be obtained for further evaluation  REVIEW OF SYSTEMS  Constitutional - No fever, No weight loss, No fatigue  HEENT - No eye pain, No visual disturbances, No difficulty hearing, No tinnitus, No vertigo, No neck pain  Respiratory - No cough, No wheezing, No shortness of breath  Cardiovascular - No chest pain, No palpitations  Gastrointestinal - No abdominal pain, No nausea, No vomiting, No diarrhea, No constipation  Genitourinary - No dysuria, No frequency, No hematuria, No incontinence  Neurological - No headaches, No memory loss, No loss of strength, No numbness, No tremors  Skin - No itching, No rashes, No lesions   Endocrine - No temperature intolerance  Musculoskeletal - No joint pain, No joint swelling, No muscle pain  Psychiatric - No depression, No anxiety    VITALS  T(C): 36.8 (08-29-23 @ 07:26), Max: 36.8 (08-29-23 @ 07:26)  HR: 73 (08-29-23 @ 07:26) (72 - 79)  BP: 92/43 (08-29-23 @ 07:26) (92/41 - 113/47)  RR: 18 (08-29-23 @ 07:26) (18 - 18)  SpO2: 97% (08-29-23 @ 07:26) (96% - 100%)  Wt(kg): --    PAST MEDICAL & SURGICAL HISTORY  No pertinent past medical history    HTN (hypertension)    HLD (hyperlipidemia)    History of peripheral neuropathy    Breast cancer    Adult hypothyroidism    History of Graves' disease    Basal cell carcinoma (BCC)    No significant past surgical history    History of cataract surgery    History of tonsillectomy    S/P dilation and curettage    Status post left breast lumpectomy        SOCIAL HISTORY - as per documentation/history  Smoking - None  EtOH - None  Drugs - None    FUNCTIONAL HISTORY  Lives   Independent    CURRENT FUNCTIONAL STATUS      FAMILY HISTORY   No pertinent family history in first degree relatives    Family hx of colon cancer (Mother, Sibling)    Family history of breast cancer (Child)        RECENT LABS - Reviewed  CBC Full  -  ( 29 Aug 2023 07:36 )  WBC Count : 13.85 K/uL  RBC Count : 2.56 M/uL  Hemoglobin : 8.1 g/dL  Hematocrit : 24.8 %  Platelet Count - Automated : 143 K/uL  Mean Cell Volume : 96.9 fl  Mean Cell Hemoglobin : 31.6 pg  Mean Cell Hemoglobin Concentration : 32.7 gm/dL  Auto Neutrophil # : x  Auto Lymphocyte # : x  Auto Monocyte # : x  Auto Eosinophil # : x  Auto Basophil # : x  Auto Neutrophil % : x  Auto Lymphocyte % : x  Auto Monocyte % : x  Auto Eosinophil % : x  Auto Basophil % : x    08-29    139  |  109<H>  |  37<H>  ----------------------------<  104<H>  4.0   |  25  |  1.19    Ca    8.5      29 Aug 2023 07:36      Urinalysis Basic - ( 29 Aug 2023 07:36 )    Color: x / Appearance: x / SG: x / pH: x  Gluc: 104 mg/dL / Ketone: x  / Bili: x / Urobili: x   Blood: x / Protein: x / Nitrite: x   Leuk Esterase: x / RBC: x / WBC x   Sq Epi: x / Non Sq Epi: x / Bacteria: x        ALLERGIES  No Known Allergies      MEDICATIONS   acetaminophen     Tablet .. 1000 milliGRAM(s) Oral every 8 hours  atorvastatin 10 milliGRAM(s) Oral at bedtime  bisacodyl 5 milliGRAM(s) Oral daily PRN  cholecalciferol 400 Unit(s) Oral daily  enoxaparin Injectable 40 milliGRAM(s) SubCutaneous every 24 hours  gabapentin 300 milliGRAM(s) Oral three times a day  HYDROmorphone  Injectable 0.5 milliGRAM(s) IV Push every 3 hours PRN  lactated ringers. 1000 milliLiter(s) IV Continuous <Continuous>  levothyroxine 112 MICROGram(s) Oral daily  magnesium hydroxide Suspension 30 milliLiter(s) Oral daily PRN  melatonin 3 milliGRAM(s) Oral at bedtime  metoprolol succinate  milliGRAM(s) Oral daily  naloxone Injectable 0.4 milliGRAM(s) IV Push once  ondansetron Injectable 4 milliGRAM(s) IV Push every 6 hours PRN  oxyCODONE    IR 2.5 milliGRAM(s) Oral every 3 hours PRN  oxyCODONE    IR 5 milliGRAM(s) Oral every 3 hours PRN  polyethylene glycol 3350 17 Gram(s) Oral at bedtime  senna 2 Tablet(s) Oral at bedtime  traMADol 25 milliGRAM(s) Oral every 6 hours PRN      ----------------------------------------------------------------------------------------  PHYSICAL EXAM  Constitutional - NAD, Comfortable  HEENT - NCAT, EOMI  Neck - Supple, No limited ROM  Chest - Breathing comfortably, No wheezing  Cardiovascular - S1S2   Abdomen - Soft   Extremities - No C/C/E, No calf tenderness   Neurologic Exam -                    Cognitive - AAO to self, place, date, year, situation     Communication - Fluent, No dysarthria     Cranial Nerves - CN 2-12 intact     Motor - No focal deficits                    LEFT    UE - ShAB 5/5, EF 5/5, EE 5/5, WE 5/5,  5/5                    RIGHT UE - ShAB 5/5, EF 5/5, EE 5/5, WE 5/5,  5/5                    LEFT    LE - HF 5/5, KE 5/5, DF 5/5, PF 5/5                    RIGHT LE - HF 5/5, KE 5/5, DF 5/5, PF 5/5        Sensory - Intact to LT     Reflexes - DTR Intact, No primitive reflexive     Coordination - FTN intact     OculoVestibular - No saccades, No nystagmus, VOR         Balance - WNL Static  Psychiatric - Mood stable, Affect WNL  ----------------------------------------------------------------------------------------   89yF was admitted on 08-26    Patient is a 89y old  Female who presents with a chief complaint of Falls in the elderly with resultant severe left hip pain (29 Aug 2023 06:19)  ++  HPI:  89 year old female PMHx significant for Left invasive ductal breast ca s/p lumpectomy s/p chemo/xrt 2008, Hypertension, Hyperlipidemia, hypothyroidism due to Grave's disease s/p BARBER, BCC of the skin, community ambulator with assistive device (walker) was BIBA from home after suffering from an unwitnessed mechanical fall with resultant severe (10/10 in intensity) left hip pain. Of note the patient was down for approximately one hour. The patient denies any associated prodrome of dizziness, palpitations, chest pain, or shortness of breath. Additionally the patient denies any head strike, loss of consciousness, or other injury to note. The patient is not currently taking any anticoagulation/antiplatelet therapy. At baseline the patient is able to perform her ADLs although requires assistive device to ambulate (walker).  (26 Aug 2023 22:36)    Patient underwent an Left hip IMN 08/27/2023. WBAT    Imaging performed:  Xray of the left hip  8/26/2023 reveals Intertrochanteric fracture left hip.   CT Pelvis 8.26.2023 Acute impacted left femoral intertrochanteric fracture as described. If there is continued clinical suspicion, MRI may be obtained for further evaluation      REVIEW OF SYSTEMS  Constitutional - No fever, No weight loss, No fatigue  HEENT - No eye pain, No visual disturbances, No difficulty hearing, No tinnitus, No vertigo, No neck pain  Respiratory - No cough, No wheezing, No shortness of breath  Cardiovascular - No chest pain, No palpitations  Gastrointestinal - No abdominal pain, No nausea, No vomiting, No diarrhea, No constipation  Genitourinary - No dysuria, No frequency, No hematuria, No incontinence  Neurological - No headaches, No memory loss, No loss of strength, No numbness, No tremors  Skin - No itching, No rashes, No lesions   Endocrine - No temperature intolerance  Musculoskeletal - No joint pain, No joint swelling, No muscle pain  Psychiatric - No depression, No anxiety    VITALS  T(C): 36.8 (08-29-23 @ 07:26), Max: 36.8 (08-29-23 @ 07:26)  HR: 73 (08-29-23 @ 07:26) (72 - 79)  BP: 92/43 (08-29-23 @ 07:26) (92/41 - 113/47)  RR: 18 (08-29-23 @ 07:26) (18 - 18)  SpO2: 97% (08-29-23 @ 07:26) (96% - 100%)  Wt(kg): --    PAST MEDICAL & SURGICAL HISTORY  No pertinent past medical history    HTN (hypertension)    HLD (hyperlipidemia)    History of peripheral neuropathy    Breast cancer    Adult hypothyroidism    History of Graves' disease    Basal cell carcinoma (BCC)    No significant past surgical history    History of cataract surgery    History of tonsillectomy    S/P dilation and curettage    Status post left breast lumpectomy        SOCIAL HISTORY - as per documentation/history  Smoking - None  EtOH - None  Drugs - None    FUNCTIONAL HISTORY  Lives with  who needs assist with ADLs    CURRENT FUNCTIONAL STATUS  Stands with maximum assist of 1 persons. takes several steps with walker    FAMILY HISTORY   No pertinent family history in first degree relatives    Family hx of colon cancer (Mother, Sibling)    Family history of breast cancer (Child)      RECENT LABS - Reviewed  CBC Full  -  ( 29 Aug 2023 07:36 )  WBC Count : 13.85 K/uL  RBC Count : 2.56 M/uL  Hemoglobin : 8.1 g/dL  Hematocrit : 24.8 %  Platelet Count - Automated : 143 K/uL  Mean Cell Volume : 96.9 fl  Mean Cell Hemoglobin : 31.6 pg  Mean Cell Hemoglobin Concentration : 32.7 gm/dL  Auto Neutrophil # : x  Auto Lymphocyte # : x  Auto Monocyte # : x  Auto Eosinophil # : x  Auto Basophil # : x  Auto Neutrophil % : x  Auto Lymphocyte % : x  Auto Monocyte % : x  Auto Eosinophil % : x  Auto Basophil % : x    08-29    139  |  109<H>  |  37<H>  ----------------------------<  104<H>  4.0   |  25  |  1.19    Ca    8.5      29 Aug 2023 07:36      Urinalysis Basic - ( 29 Aug 2023 07:36 )    Color: x / Appearance: x / SG: x / pH: x  Gluc: 104 mg/dL / Ketone: x  / Bili: x / Urobili: x   Blood: x / Protein: x / Nitrite: x   Leuk Esterase: x / RBC: x / WBC x   Sq Epi: x / Non Sq Epi: x / Bacteria: x    ALLERGIES  No Known Allergies    MEDICATIONS   acetaminophen     Tablet .. 1000 milliGRAM(s) Oral every 8 hours  atorvastatin 10 milliGRAM(s) Oral at bedtime  bisacodyl 5 milliGRAM(s) Oral daily PRN  cholecalciferol 400 Unit(s) Oral daily  enoxaparin Injectable 40 milliGRAM(s) SubCutaneous every 24 hours  gabapentin 300 milliGRAM(s) Oral three times a day  HYDROmorphone  Injectable 0.5 milliGRAM(s) IV Push every 3 hours PRN  lactated ringers. 1000 milliLiter(s) IV Continuous <Continuous>  levothyroxine 112 MICROGram(s) Oral daily  magnesium hydroxide Suspension 30 milliLiter(s) Oral daily PRN  melatonin 3 milliGRAM(s) Oral at bedtime  metoprolol succinate  milliGRAM(s) Oral daily  naloxone Injectable 0.4 milliGRAM(s) IV Push once  ondansetron Injectable 4 milliGRAM(s) IV Push every 6 hours PRN  oxyCODONE    IR 2.5 milliGRAM(s) Oral every 3 hours PRN  oxyCODONE    IR 5 milliGRAM(s) Oral every 3 hours PRN  polyethylene glycol 3350 17 Gram(s) Oral at bedtime  senna 2 Tablet(s) Oral at bedtime  traMADol 25 milliGRAM(s) Oral every 6 hours PRN      ----------------------------------------------------------------------------------------  PHYSICAL EXAM  Constitutional - NAD, Comfortable  HEENT - NCAT, EOMI  Neck - Supple, No limited ROM  Chest - Breathing comfortably, No wheezing  Cardiovascular - S1S2   Abdomen - Soft   Extremities - No C/C/E, No calf tenderness   Neurologic Exam -                    Cognitive - AAO to self, place, date, year, situation     Communication - Fluent, No dysarthria     Cranial Nerves - CN 2-12 intact     Motor - No focal deficits                    LEFT    UE - ShAB 5-/5, EF 5-/5, EE 5-/5, WE 5-/5,  5-/5                    RIGHT UE - ShAB 5-/5, EF 5-/5, EE 5-/5, WE 5-/5,  5-/5                    LEFT    LE - HF 3/5, KE 4/5, DF 4/5, PF 4/5                    RIGHT LE - HF 4/5, KE 4/5 DF 4/5, PF 4/5        Sensory - Intact to LT     Reflexes - DTR Intact, No primitive reflexive     Coordination - FTN intact     OculoVestibular - No saccades, No nystagmus, VOR         Balance - WNL Static  Psychiatric - Mood stable, Affect WNL  ----------------------------------------------------------------------------------------

## 2023-08-29 NOTE — PROGRESS NOTE ADULT - SUBJECTIVE AND OBJECTIVE BOX
c/c: fall/left hip pain    HPI:  89 year old female PMHx significant for Left invasive ductal breast ca s/p lumpectomy s/p chemo/xrt 2008, Hypertension, Hyperlipidemia, hypothyroidism due to Grave's disease s/p BRABER, BCC of the skin, community ambulator with assistive device (walker) was BIBA from home after suffering from an unwitnessed mechanical fall with resultant severe (10/10 in intensity) left hip pain. Of note the patient was down for approximately one hour. The patient denies any associated prodrome of dizziness, palpitations, chest pain, or shortness of breath. Additionally the patient denies any head strike, loss of consciousness, or other injury to note. The patient is not currently taking any anticoagulation/antiplatelet therapy. At baseline the patient is able to perform her ADLs although requires assistive device to ambulate (walker).    She is admitted with a left hip fracture.   now s/p IM nail 8/27    pt seen and examined this am. Ambulate with physical therapy. Feels tired. no sob/chest pain. Tolerating po. no n/v. no difficulty voiding.     ROS: all 10 systems reviewed and is as above otherwise negative.       Vital Signs Last 24 Hrs  T(C): 36.8 (29 Aug 2023 07:26), Max: 36.8 (29 Aug 2023 07:26)  T(F): 98.2 (29 Aug 2023 07:26), Max: 98.2 (29 Aug 2023 07:26)  HR: 73 (29 Aug 2023 07:26) (72 - 79)  BP: 92/43 (29 Aug 2023 07:26) (92/41 - 113/47)  RR: 18 (29 Aug 2023 07:26) (18 - 18)  SpO2: 97% (29 Aug 2023 07:26) (96% - 98%)    Parameters below as of 29 Aug 2023 07:26  Patient On (Oxygen Delivery Method): nasal cannula  O2 Flow (L/min): 2      PHYSICAL EXAM:    GENERAL: Comfortable, no acute distress  HEAD:  Atraumatic, Normocephalic  EYES: EOMI, PERRLA  HEENT: Moist mucous membranes  NECK: Supple, No JVD  NERVOUS SYSTEM:  Alert & Oriented X3, Motor Strength 5/5 B/L upper and lower extremities  CHEST/LUNG: Clear to auscultation bilaterally  HEART: Regular rate and rhythm;  ABDOMEN: Soft, Nontender, Nondistended; Bowel sounds present  GENITOURINARY- Voiding, no palpable bladder  EXTREMITIES:  No clubbing, cyanosis, or edema  MUSCULOSKELETAL-Left hip dressing intact, underlying hematoma  SKIN-no rash      LABS:                        8.1    13.85 )-----------( 143      ( 29 Aug 2023 07:36 )             24.8     08-29    139  |  109<H>  |  37<H>  ----------------------------<  104<H>  4.0   |  25  |  1.19    Ca    8.5      29 Aug 2023 07:36        Urinalysis Basic - ( 29 Aug 2023 07:36 )    Color: x / Appearance: x / SG: x / pH: x  Gluc: 104 mg/dL / Ketone: x  / Bili: x / Urobili: x   Blood: x / Protein: x / Nitrite: x   Leuk Esterase: x / RBC: x / WBC x   Sq Epi: x / Non Sq Epi: x / Bacteria: x          MEDS  acetaminophen     Tablet .. 1000 milliGRAM(s) Oral every 8 hours  atorvastatin 10 milliGRAM(s) Oral at bedtime  bisacodyl 5 milliGRAM(s) Oral daily PRN  gabapentin 300 milliGRAM(s) Oral three times a day  HYDROmorphone  Injectable 0.5 milliGRAM(s) IV Push every 10 minutes PRN  lactated ringers. 1000 milliLiter(s) IV Continuous <Continuous>  levothyroxine 112 MICROGram(s) Oral daily  metoprolol succinate  milliGRAM(s) Oral daily  morphine  - Injectable 2 milliGRAM(s) IV Push every 4 hours PRN  morphine  - Injectable 4 milliGRAM(s) IV Push every 4 hours PRN  naloxone Injectable 0.4 milliGRAM(s) IV Push once  ondansetron Injectable 4 milliGRAM(s) IV Push once PRN  polyethylene glycol 3350 17 Gram(s) Oral daily  senna 2 Tablet(s) Oral at bedtime  sodium chloride 0.9%. 1000 milliLiter(s) IV Continuous <Continuous>      ASSESSMENT AND PLAN    89f, PMH as above a/w    #Mechanical fall/left hip fracture:  -s/p im nail pod#2  -pain control  -physical therapy post op  -incentive spirometry  -bowel regimen.    #Acute blood loss anemia:  -likely related to fracture/surgery  -repeat CBC at noon, may need transfusion.     #Vit D insufficiency:  -started supplement    #Hypothyroidism  -cont. Levothyroxine 112mcg po qam    #Hypertension  -continue bb with hold parameters  -arb on hold for borderline bp    #Hyperlipidemia  -continue statin.     #Peripheral Neuropathy  -cont. Gabapentin 300mg po tid    #Vte ppx  -lovenox

## 2023-08-29 NOTE — PROGRESS NOTE ADULT - SUBJECTIVE AND OBJECTIVE BOX
Pt seen at bedside. No acute complaints, pain is well managed. Denies numbness, tingling, fevers, chills, CP, SOB, N/V/D.    Vital Signs Last 24 Hrs  T(C): 36.5 (29 Aug 2023 04:20), Max: 36.7 (28 Aug 2023 20:00)  T(F): 97.7 (29 Aug 2023 04:20), Max: 98.1 (28 Aug 2023 20:00)  HR: 72 (29 Aug 2023 04:20) (62 - 79)  BP: 102/45 (29 Aug 2023 04:20) (92/41 - 113/47)  BP(mean): --  RR: 18 (29 Aug 2023 04:20) (18 - 18)  SpO2: 96% (29 Aug 2023 04:20) (96% - 100%)    Parameters below as of 29 Aug 2023 04:20  Patient On (Oxygen Delivery Method): nasal cannula                          9.3    11.97 )-----------( 110      ( 28 Aug 2023 07:37 )             28.0       08-28    139  |  109<H>  |  19  ----------------------------<  116<H>  4.4   |  23  |  0.76    Ca    8.2<L>      28 Aug 2023 07:37              Physical Exam:  General: NAD, pt laying in bed comfortably  SCDs in place BL    LLE:  Compartments soft, compressible  Calves nontender  Motor: +GSC, TA, EHL, FHL  SILT: SPN, DPN, Tib, Saph, Deana  +DP    A/P:    89F POD2 s/p L IMN    WBAT  PT/OT  Follow up AM labs  Analgesics  DVT PPx: Lovenox  Incentive spirometry  Dispo: AR v [facility]  Medical management appreciated  Will discuss plan w/ Dr. Kramer and change accordingly.

## 2023-08-29 NOTE — CONSULT NOTE ADULT - ASSESSMENT
ASSESSMENT/PLAN  89yFemale with functional deficits after  Pain - Tylenol  DVT PPX - SCDs  Rehab -     Evaluation in Progress     Recommend ACUTE inpatient rehabilitation for the functional deficits consisting of 3 hours of therapy/day & 24 hour RN/daily PMR physician for comorbid medical management. Patient will be able to tolerate 3 hours a day.   Recommend [facility], patient DOES NOT meet acute inpatient rehabilitation criteria. Patient needs a more prolonged stay to achieve transition to community.    Expect patient to achieve functional goals for DC HOME with OUTPATIENT   Expect patient to achieve functional goals for DC HOME with HOME CARE   Follow up with CONCUSSION PROGRAM - Call [phone] for an appointment    Will continue to follow. Functional progress will determine ongoing rehab dispo recommendations, which may change.    Continue bedside therapy as well as OOB throughout the day with mobilization by staff to maintain cardiopulmonary function and prevention of secondary complications related to debility.      ASSESSMENT/PLAN  89yFemale with functional deficits after left IT fracture s/p IMN WBAT LLE  Pain - Tylenol  DVT PPX - SCDs  Rehab -      Recommend ACUTE inpatient rehabilitation when medically stable/  Patient needs acute rehabilitation for the functional deficits consisting of 3 hours of therapy/day & 24 hour RN/daily PMR physician for comorbid medical management. Patient will be able to tolerate 3 hours a day.     Patient has an increasing WBC in view of Decrease H/H.    She will need MD rivera monitor HTN, Anemia left hip wound  RN to monitor for pain  PT for ambulation  OT for ADL training in view of left IT fracture ie transfers and LE dress.  ELOs 10-14 days    Will continue to follow. Functional progress will determine ongoing rehab dispo recommendations, which may change.    Continue bedside therapy as well as OOB throughout the day with mobilization by staff to maintain cardiopulmonary function and prevention of secondary complications related to debility.

## 2023-08-30 ENCOUNTER — INPATIENT (INPATIENT)
Facility: HOSPITAL | Age: 88
LOS: 14 days | Discharge: HOME HEALTH | DRG: 560 | End: 2023-09-14
Attending: PHYSICAL MEDICINE & REHABILITATION | Admitting: PHYSICAL MEDICINE & REHABILITATION
Payer: MEDICARE

## 2023-08-30 ENCOUNTER — TRANSCRIPTION ENCOUNTER (OUTPATIENT)
Age: 88
End: 2023-08-30

## 2023-08-30 VITALS
OXYGEN SATURATION: 96 % | SYSTOLIC BLOOD PRESSURE: 122 MMHG | HEIGHT: 63 IN | RESPIRATION RATE: 16 BRPM | HEART RATE: 88 BPM | WEIGHT: 178.57 LBS | TEMPERATURE: 98 F | DIASTOLIC BLOOD PRESSURE: 68 MMHG

## 2023-08-30 DIAGNOSIS — Z90.89 ACQUIRED ABSENCE OF OTHER ORGANS: Chronic | ICD-10-CM

## 2023-08-30 DIAGNOSIS — Z98.890 OTHER SPECIFIED POSTPROCEDURAL STATES: Chronic | ICD-10-CM

## 2023-08-30 DIAGNOSIS — Z98.49 CATARACT EXTRACTION STATUS, UNSPECIFIED EYE: Chronic | ICD-10-CM

## 2023-08-30 DIAGNOSIS — S72.146A NONDISPLACED INTERTROCHANTERIC FRACTURE OF UNSPECIFIED FEMUR, INITIAL ENCOUNTER FOR CLOSED FRACTURE: ICD-10-CM

## 2023-08-30 PROBLEM — E78.5 HYPERLIPIDEMIA, UNSPECIFIED: Chronic | Status: ACTIVE | Noted: 2023-08-26

## 2023-08-30 PROBLEM — C50.919 MALIGNANT NEOPLASM OF UNSPECIFIED SITE OF UNSPECIFIED FEMALE BREAST: Chronic | Status: ACTIVE | Noted: 2023-08-26

## 2023-08-30 PROBLEM — Z86.39 PERSONAL HISTORY OF OTHER ENDOCRINE, NUTRITIONAL AND METABOLIC DISEASE: Chronic | Status: ACTIVE | Noted: 2023-08-27

## 2023-08-30 PROBLEM — E03.9 HYPOTHYROIDISM, UNSPECIFIED: Chronic | Status: ACTIVE | Noted: 2023-08-26

## 2023-08-30 PROBLEM — Z86.69 PERSONAL HISTORY OF OTHER DISEASES OF THE NERVOUS SYSTEM AND SENSE ORGANS: Chronic | Status: ACTIVE | Noted: 2023-08-26

## 2023-08-30 PROBLEM — C44.91 BASAL CELL CARCINOMA OF SKIN, UNSPECIFIED: Chronic | Status: ACTIVE | Noted: 2023-08-27

## 2023-08-30 PROBLEM — I10 ESSENTIAL (PRIMARY) HYPERTENSION: Chronic | Status: ACTIVE | Noted: 2023-08-26

## 2023-08-30 LAB
ANION GAP SERPL CALC-SCNC: 3 MMOL/L — LOW (ref 5–17)
BUN SERPL-MCNC: 30 MG/DL — HIGH (ref 7–23)
CALCIUM SERPL-MCNC: 8.5 MG/DL — SIGNIFICANT CHANGE UP (ref 8.5–10.1)
CHLORIDE SERPL-SCNC: 111 MMOL/L — HIGH (ref 96–108)
CO2 SERPL-SCNC: 25 MMOL/L — SIGNIFICANT CHANGE UP (ref 22–31)
CREAT SERPL-MCNC: 0.78 MG/DL — SIGNIFICANT CHANGE UP (ref 0.5–1.3)
EGFR: 72 ML/MIN/1.73M2 — SIGNIFICANT CHANGE UP
GLUCOSE SERPL-MCNC: 108 MG/DL — HIGH (ref 70–99)
HCT VFR BLD CALC: 26 % — LOW (ref 34.5–45)
HGB BLD-MCNC: 8.8 G/DL — LOW (ref 11.5–15.5)
MCHC RBC-ENTMCNC: 31.9 PG — SIGNIFICANT CHANGE UP (ref 27–34)
MCHC RBC-ENTMCNC: 33.8 GM/DL — SIGNIFICANT CHANGE UP (ref 32–36)
MCV RBC AUTO: 94.2 FL — SIGNIFICANT CHANGE UP (ref 80–100)
PLATELET # BLD AUTO: 146 K/UL — LOW (ref 150–400)
POTASSIUM SERPL-MCNC: 4.2 MMOL/L — SIGNIFICANT CHANGE UP (ref 3.5–5.3)
POTASSIUM SERPL-SCNC: 4.2 MMOL/L — SIGNIFICANT CHANGE UP (ref 3.5–5.3)
RBC # BLD: 2.76 M/UL — LOW (ref 3.8–5.2)
RBC # FLD: 15.9 % — HIGH (ref 10.3–14.5)
SODIUM SERPL-SCNC: 139 MMOL/L — SIGNIFICANT CHANGE UP (ref 135–145)
WBC # BLD: 10.89 K/UL — HIGH (ref 3.8–10.5)
WBC # FLD AUTO: 10.89 K/UL — HIGH (ref 3.8–10.5)

## 2023-08-30 PROCEDURE — 99222 1ST HOSP IP/OBS MODERATE 55: CPT | Mod: GC

## 2023-08-30 PROCEDURE — 99239 HOSP IP/OBS DSCHRG MGMT >30: CPT

## 2023-08-30 RX ORDER — SENNA PLUS 8.6 MG/1
2 TABLET ORAL
Qty: 0 | Refills: 0 | DISCHARGE
Start: 2023-08-30

## 2023-08-30 RX ORDER — METOPROLOL TARTRATE 50 MG
100 TABLET ORAL DAILY
Refills: 0 | Status: DISCONTINUED | OUTPATIENT
Start: 2023-08-30 | End: 2023-09-08

## 2023-08-30 RX ORDER — POLYETHYLENE GLYCOL 3350 17 G/17G
17 POWDER, FOR SOLUTION ORAL
Qty: 0 | Refills: 0 | DISCHARGE
Start: 2023-08-30

## 2023-08-30 RX ORDER — CHOLECALCIFEROL (VITAMIN D3) 125 MCG
400 CAPSULE ORAL
Qty: 0 | Refills: 0 | DISCHARGE
Start: 2023-08-30

## 2023-08-30 RX ORDER — POLYETHYLENE GLYCOL 3350 17 G/17G
17 POWDER, FOR SOLUTION ORAL DAILY
Refills: 0 | Status: DISCONTINUED | OUTPATIENT
Start: 2023-08-30 | End: 2023-09-14

## 2023-08-30 RX ORDER — GABAPENTIN 400 MG/1
300 CAPSULE ORAL THREE TIMES A DAY
Refills: 0 | Status: DISCONTINUED | OUTPATIENT
Start: 2023-08-30 | End: 2023-09-14

## 2023-08-30 RX ORDER — ENOXAPARIN SODIUM 100 MG/ML
40 INJECTION SUBCUTANEOUS
Qty: 0 | Refills: 0 | DISCHARGE
Start: 2023-08-30

## 2023-08-30 RX ORDER — OXYCODONE HYDROCHLORIDE 5 MG/1
5 TABLET ORAL EVERY 6 HOURS
Refills: 0 | Status: DISCONTINUED | OUTPATIENT
Start: 2023-08-30 | End: 2023-08-30

## 2023-08-30 RX ORDER — TRAMADOL HYDROCHLORIDE 50 MG/1
50 TABLET ORAL EVERY 6 HOURS
Refills: 0 | Status: DISCONTINUED | OUTPATIENT
Start: 2023-08-30 | End: 2023-09-06

## 2023-08-30 RX ORDER — ENOXAPARIN SODIUM 100 MG/ML
40 INJECTION SUBCUTANEOUS EVERY 24 HOURS
Refills: 0 | Status: DISCONTINUED | OUTPATIENT
Start: 2023-08-31 | End: 2023-09-14

## 2023-08-30 RX ORDER — CHOLECALCIFEROL (VITAMIN D3) 125 MCG
400 CAPSULE ORAL DAILY
Refills: 0 | Status: DISCONTINUED | OUTPATIENT
Start: 2023-08-31 | End: 2023-09-14

## 2023-08-30 RX ORDER — LEVOTHYROXINE SODIUM 125 MCG
112 TABLET ORAL DAILY
Refills: 0 | Status: DISCONTINUED | OUTPATIENT
Start: 2023-08-31 | End: 2023-09-14

## 2023-08-30 RX ORDER — METOPROLOL TARTRATE 50 MG
100 TABLET ORAL DAILY
Refills: 0 | Status: DISCONTINUED | OUTPATIENT
Start: 2023-08-30 | End: 2023-08-30

## 2023-08-30 RX ORDER — SENNA PLUS 8.6 MG/1
2 TABLET ORAL AT BEDTIME
Refills: 0 | Status: DISCONTINUED | OUTPATIENT
Start: 2023-08-30 | End: 2023-09-14

## 2023-08-30 RX ORDER — ATORVASTATIN CALCIUM 80 MG/1
10 TABLET, FILM COATED ORAL AT BEDTIME
Refills: 0 | Status: DISCONTINUED | OUTPATIENT
Start: 2023-08-30 | End: 2023-09-14

## 2023-08-30 RX ORDER — ACETAMINOPHEN 500 MG
975 TABLET ORAL EVERY 8 HOURS
Refills: 0 | Status: DISCONTINUED | OUTPATIENT
Start: 2023-08-30 | End: 2023-09-14

## 2023-08-30 RX ADMIN — ATORVASTATIN CALCIUM 10 MILLIGRAM(S): 80 TABLET, FILM COATED ORAL at 20:36

## 2023-08-30 RX ADMIN — ENOXAPARIN SODIUM 40 MILLIGRAM(S): 100 INJECTION SUBCUTANEOUS at 05:26

## 2023-08-30 RX ADMIN — Medication 100 MILLIGRAM(S): at 18:07

## 2023-08-30 RX ADMIN — SENNA PLUS 2 TABLET(S): 8.6 TABLET ORAL at 20:36

## 2023-08-30 RX ADMIN — Medication 400 UNIT(S): at 09:40

## 2023-08-30 RX ADMIN — GABAPENTIN 300 MILLIGRAM(S): 400 CAPSULE ORAL at 20:36

## 2023-08-30 RX ADMIN — GABAPENTIN 300 MILLIGRAM(S): 400 CAPSULE ORAL at 05:25

## 2023-08-30 RX ADMIN — Medication 975 MILLIGRAM(S): at 21:10

## 2023-08-30 RX ADMIN — Medication 112 MICROGRAM(S): at 05:25

## 2023-08-30 RX ADMIN — Medication 1000 MILLIGRAM(S): at 05:26

## 2023-08-30 RX ADMIN — POLYETHYLENE GLYCOL 3350 17 GRAM(S): 17 POWDER, FOR SOLUTION ORAL at 18:07

## 2023-08-30 RX ADMIN — Medication 975 MILLIGRAM(S): at 20:36

## 2023-08-30 RX ADMIN — Medication 1000 MILLIGRAM(S): at 05:30

## 2023-08-30 NOTE — H&P ADULT - NSHPPHYSICALEXAM_GEN_ALL_CORE
PHYSICAL EXAM  Constitutional - NAD, Comfortable  HEENT - NCAT, EOMI  Neck - Supple, No limited ROM  Chest - Breathing comfortably, No wheezing  Cardiovascular - S1S2   Abdomen - Soft   Extremities - No C/C/E, No calf tenderness   Neurologic Exam -                    Cognitive - AAO to self, place, date, year, situation     Communication - Fluent, No dysarthria     Cranial Nerves - CN 2-12 intact     Motor - No focal deficits                    LEFT    UE - ShAB 5-/5, EF 5-/5, EE 5-/5, WE 5-/5,  5-/5                    RIGHT UE - ShAB 5-/5, EF 5-/5, EE 5-/5, WE 5-/5,  5-/5                    LEFT    LE - HF 3/5, KE 4/5, DF 4/5, PF 4/5                    RIGHT LE - HF 4/5, KE 4/5 DF 4/5, PF 4/5        Sensory - Intact to LT     Reflexes - DTR Intact, No primitive reflexive     Coordination - FTN intact     OculoVestibular - No saccades, No nystagmus, VOR         Balance - WNL Static  Psychiatric - Mood stable, Affect WNL PHYSICAL EXAM  Constitutional - NAD, Comfortable  HEENT - NCAT, EOMI  Neck - Supple, No limited ROM  Chest - Breathing comfortably, No wheezing, clear lungs  Cardiovascular - S1S2   Abdomen - Soft, rounded, hyperactive  Extremities - No C/C/E, No calf tenderness   Neurologic Exam -                    Cognitive - AAO to self, place, date, year, situation     Communication - Fluent, No dysarthria     Cranial Nerves - CN 2-12 intact     Motor - No focal deficits                    LEFT    UE - ShAB 5/5, EF 5-/5, EE 5/5, WE 5/5,  5/5                    RIGHT UE - ShAB 5/5, EF 5/5, EE 5/5, WE 5/5,  5/5                    LEFT    LE - HF at least 2/5, KE 3/5, DF 4/5, PF 5/5                    RIGHT LE - HF 4/5, KE 5/5 DF 5/5, PF 5/5        Sensory - Intact to LT     Reflexes - DTR Intact     Coordination - FTN intact     Balance - impaired  Psychiatric - Mood stable, Affect WNL Vital Signs Last 24 Hrs  T(C): 36.9 (30 Aug 2023 15:00), Max: 36.9 (29 Aug 2023 20:21)  T(F): 98.4 (30 Aug 2023 15:00), Max: 98.4 (29 Aug 2023 20:21)  HR: 67 (30 Aug 2023 17:55) (67 - 88)  BP: 129/73 (30 Aug 2023 17:55) (109/45 - 129/73)  BP(mean): --  RR: 16 (30 Aug 2023 15:00) (16 - 18)  SpO2: 96% (30 Aug 2023 15:00) (92% - 100%)    Parameters below as of 30 Aug 2023 15:00  Patient On (Oxygen Delivery Method): room air    PHYSICAL EXAM  Constitutional - NAD, Comfortable  HEENT - NCAT, EOMI  Neck - Supple, No limited ROM  Chest - Breathing comfortably, No wheezing, clear lungs  Cardiovascular - S1S2   Abdomen - Soft, rounded, hyperactive  Extremities - No C/C/E, No calf tenderness   Neurologic Exam -                    Cognitive - AAO to self, place, date, year, situation     Communication - Fluent, No dysarthria     Cranial Nerves - CN 2-12 intact     Motor - No focal deficits                    LEFT    UE - ShAB 5/5, EF 5-/5, EE 5/5, WE 5/5,  5/5                    RIGHT UE - ShAB 5/5, EF 5/5, EE 5/5, WE 5/5,  5/5                    LEFT    LE - HF at least 2/5, KE 5/5, DF 5/5, PF 5/5                    RIGHT LE - HF 4/5, KE 5/5 DF 5/5, PF 5/5        Sensory - Intact to LT     Reflexes - DTR Intact     Coordination - FTN intact     Balance - impaired  Psychiatric - Mood stable, Affect WNL

## 2023-08-30 NOTE — PROGRESS NOTE ADULT - REASON FOR ADMISSION
Falls in the elderly with resultant severe left hip pain

## 2023-08-30 NOTE — H&P ADULT - ASSESSMENT
89yo Female with   , now with decreased functional mobility, gait instability and ADL impairments.  dysphagia, aphasia, hemiplegia    COMORBIDITES/ACTIVE MEDICAL ISSUES     Gait Instability, ADL impairments and Functional impairments: start Comprehensive Rehab Program of PT/OT       #Mechanical fall/left hip fracture:  -s/p im nail, WBAT  -pain control  -incentive spirometry  -bowel regimen.  -fall precautions    #Acute blood loss anemia:  -blood loss, s/p 1u PRBC post op  -follow CBC     #Vit D insufficiency:  - supplement    #Hypothyroidism  -cont. Levothyroxine 112mcg    #Hypertension  -continue bb with hold parameters  -arb on hold for borderline bp    #Hyperlipidemia  -continue statin.     #Peripheral Neuropathy  -cont. Gabapentin 300mg po tid    #Vte ppx  -lovenox    Pain Mgmt - Tylenol PRN  GI/Bowel Mgmt -  Senna,  Miralax PRN  /Bladder Mgmt - Voiding independently, PVRx1      Precautions / PROPHYLAXIS:   - Falls, Cardiac  - Ortho: Weight bearing status: WBAT   - Lungs: Aspiration, Incentive Spirometer   - Pressure injury/Skin: Turn Q2hrs while in bed, OOB to Chair, PT/OT    - DVT: Lovenox    MEDICAL PROGNOSIS: GOOD            REHAB POTENTIAL: GOOD             ESTIMATED DISPOSITION: HOME WITH HOME CARE            ELOS: 10-14 Days   EXPECTED THERAPY:     P.T. 1hr/day       O.T. 1hr/day      S.L.P. 1hr/day     P&O Unnecessary     EXP FREQUENCY: 5 days per 7 day period     PRESCREEN COMPARISON   I have reviewed the prescreen information and I have found no relevant changes between the preadmission screening and my post admission evaluation     RATIONALE FOR INPATIENT ADMISSION - Patient demonstrates the following: (check all that apply)  [X] Medically appropriate for rehabilitation admission  [X] Has attainable rehab goals with an appropriate initial discharge plan  [X] Has rehabilitation potential (expected to make a significant improvement within a reasonable period of time)   [X] Requires close medical management by a rehab physician, rehab nursing care, Hospitalist and comprehensive interdisciplinary team (including PT, OT, & or SLP, Prosthetics and Orthotics)       89yo Female s/p left IT fx s/p repair , now with decreased functional mobility, gait instability and ADL impairments.    COMORBIDITES/ACTIVE MEDICAL ISSUES     Gait Instability, ADL impairments and Functional impairments: start Comprehensive Rehab Program of PT/OT       #Mechanical fall/left hip fracture:  -s/p im nail, WBAT  -pain control w/Tylenol q8h, ice and prn Tramadol  -incentive spirometry  -bowel regimen.  -fall precautions  -start PT OT evaluations    #Acute blood loss anemia:  -blood loss, s/p 1u PRBC post op  -follow CBC     #Vit D insufficiency:  - supplement    #Hypothyroidism  -cont. Levothyroxine 112mcg    #Hypertension  -continue bb with hold parameters  -arb/ace on hold for borderline bp    #Hyperlipidemia  -continue statin.     #Peripheral Neuropathy  -cont. Gabapentin 300mg po tid    #Vte ppx  -lovenox    Pain Mgmt - Tylenol PRN  GI/Bowel Mgmt -  Senna,  Miralax   /Bladder Mgmt - Voiding independently, PVRx1      Precautions / PROPHYLAXIS:   - Falls, Cardiac  - Ortho: Weight bearing status: WBAT   - Lungs: Aspiration, Incentive Spirometer   - Pressure injury/Skin: Turn Q2hrs while in bed, OOB to Chair, PT/OT    - DVT: Lovenox    MEDICAL PROGNOSIS: GOOD            REHAB POTENTIAL: GOOD             ESTIMATED DISPOSITION: HOME WITH HOME CARE            ELOS: 10-14 Days   EXPECTED THERAPY:     P.T. 2hr/day       O.T. 1hr/day      S.L.P. na    EXP FREQUENCY: 5 days per 7 day period     PRESCREEN COMPARISON   I have reviewed the prescreen information and I have found no relevant changes between the preadmission screening and my post admission evaluation     RATIONALE FOR INPATIENT ADMISSION - Patient demonstrates the following: (check all that apply)  [X] Medically appropriate for rehabilitation admission  [X] Has attainable rehab goals with an appropriate initial discharge plan  [X] Has rehabilitation potential (expected to make a significant improvement within a reasonable period of time)   [X] Requires close medical management by a rehab physician, rehab nursing care, Hospitalist and comprehensive interdisciplinary team (including PT, OT, & or SLP, Prosthetics and Orthotics)

## 2023-08-30 NOTE — PATIENT PROFILE ADULT - FALL HARM RISK - HARM RISK INTERVENTIONS
Assistance with ambulation/Assistance OOB with selected safe patient handling equipment/Communicate Risk of Fall with Harm to all staff/Discuss with provider need for PT consult/Monitor gait and stability/Provide patient with walking aids - walker, cane, crutches/Reinforce activity limits and safety measures with patient and family/Sit up slowly, dangle for a short time, stand at bedside before walking/Tailored Fall Risk Interventions/Use of alarms - bed, chair and/or voice tab/Visual Cue: Yellow wristband and red socks/Bed in lowest position, wheels locked, appropriate side rails in place/Call bell, personal items and telephone in reach/Instruct patient to call for assistance before getting out of bed or chair/Non-slip footwear when patient is out of bed/[place] to call system/Physically safe environment - no spills, clutter or unnecessary equipment/Purposeful Proactive Rounding/Room/bathroom lighting operational, light cord in reach

## 2023-08-30 NOTE — H&P ADULT - NSICDXPASTSURGICALHX_GEN_ALL_CORE_FT
PAST SURGICAL HISTORY:  History of cataract surgery     History of tonsillectomy     S/P dilation and curettage     Status post left breast lumpectomy

## 2023-08-30 NOTE — H&P ADULT - NSHPLABSRESULTS_GEN_ALL_CORE
ACC: [id number] EXAM:  CT 3D RECONSTRUCT WO ZAIRE   ORDERED BY: DOMENIC MILTON     ACC: [id number] EXAM:  CT PELVIS BONY ONLY   ORDERED BY: DOMENIC MILTON     PROCEDURE DATE:  08/26/2023          INTERPRETATION:  CLINICAL INDICATION: left hip pain post fall    TECHNIQUE: CT axial images of the pelvis were obtained without   intravenous contrast. Coronal and sagittal reformatted images were also   obtained. 3-D images were obtained from a separate workstation.    CONTRAST/COMPLICATIONS:  IV Contrast: NONE  Complications: None reported at time of study completion    COMPARISON: XR: 8/26/2023. CT: None. MR: None.    FINDINGS: Evaluation of soft tissue is limited without intravenous   contrast.    Bones: Decreased bone mineralization. Acute, impactedleft femoral   intertrochanteric fracture with mildly displaced fracture fragments and   anterior apex angulation. Superior displacement of the femoral diaphysis.   Fracture involving the lower portion of the femoral neck. Varus   configuration of the left hip. No dislocation.    Degenerative changes of the visualized lower lumbar spine, pubic   symphysis, sacroiliac joints. Sligh corticated contour deformity of the   left anterior sacrum, suggesting chronic.    Soft tissue: Edema/hematoma at theleft pelvic/proximal thigh soft   tissue. Diffuse muscle bulk loss with fatty replacement. Left hip   lipohemarthrosis.    Additional: Partially visualized, probable gallbladder containing stones.   Colon diverticulosis. Heterogenous attenuation of the uterus, which may   be due to fibroid but is suboptimally assessed on this study. Nonspecific   mild contour deformity of the lower uterine segment. Atherosclerosis.    IMPRESSION:    Acute impacted left femoral intertrochanteric fracture as described. If   there is continued clinical suspicion, MRI may be obtained for further   evaluation.    --- End of Report ---            RYAN HILL MD; Attending Radiologist  This document has been electronically signed. Aug 26 2023  9:46PM

## 2023-08-30 NOTE — PROGRESS NOTE ADULT - SUBJECTIVE AND OBJECTIVE BOX
c/c: fall/left hip pain    HPI:  89 year old female PMHx significant for Left invasive ductal breast ca s/p lumpectomy s/p chemo/xrt 2008, Hypertension, Hyperlipidemia, hypothyroidism due to Grave's disease s/p BARBER, BCC of the skin, community ambulator with assistive device (walker) was BIBA from home after suffering from an unwitnessed mechanical fall with resultant severe (10/10 in intensity) left hip pain. Of note the patient was down for approximately one hour. The patient denies any associated prodrome of dizziness, palpitations, chest pain, or shortness of breath. Additionally the patient denies any head strike, loss of consciousness, or other injury to note. The patient is not currently taking any anticoagulation/antiplatelet therapy. At baseline the patient is able to perform her ADLs although requires assistive device to ambulate (walker).    She is admitted with a left hip fracture.   now s/p IM nail 8/27  Post op required 1 unit prbcs.     pt seen and examined this am. doing well. Minimal pain at rest. Tolerating po. No difficulty voiding. no sob/chest pain. No cough/sputum.     ROS: all 10 systems reviewed and is as above otherwise negative.     Vital Signs Last 24 Hrs  T(C): 36.7 (30 Aug 2023 07:40), Max: 36.9 (29 Aug 2023 20:21)  T(F): 98.1 (30 Aug 2023 07:40), Max: 98.4 (29 Aug 2023 20:21)  HR: 86 (30 Aug 2023 07:40) (83 - 94)  BP: 109/50 (30 Aug 2023 07:40) (106/49 - 118/45)  RR: 18 (30 Aug 2023 07:40) (18 - 18)  SpO2: 92% (30 Aug 2023 07:40) (92% - 100%)    Parameters below as of 30 Aug 2023 07:40  Patient On (Oxygen Delivery Method): room air        PHYSICAL EXAM:    GENERAL: Comfortable, no acute distress  HEAD:  Atraumatic, Normocephalic  EYES: EOMI, PERRLA  HEENT: Moist mucous membranes  NECK: Supple, No JVD  NERVOUS SYSTEM:  Alert & Oriented X3, Motor Strength 5/5 B/L upper and lower extremities  CHEST/LUNG: Clear to auscultation bilaterally  HEART: Regular rate and rhythm;  ABDOMEN: Soft, Nontender, Nondistended; Bowel sounds present  GENITOURINARY- Voiding, no palpable bladder  EXTREMITIES:  No clubbing, cyanosis, or edema  MUSCULOSKELETAL-Left hip dressing intact, underlying hematoma  SKIN-no rash    LABS:                        8.8    10.89 )-----------( 146      ( 30 Aug 2023 06:49 )             26.0     08-30    139  |  111<H>  |  30<H>  ----------------------------<  108<H>  4.2   |  25  |  0.78    Ca    8.5      30 Aug 2023 06:49        Urinalysis Basic - ( 30 Aug 2023 06:49 )    Color: x / Appearance: x / SG: x / pH: x  Gluc: 108 mg/dL / Ketone: x  / Bili: x / Urobili: x   Blood: x / Protein: x / Nitrite: x   Leuk Esterase: x / RBC: x / WBC x   Sq Epi: x / Non Sq Epi: x / Bacteria: x      MEDS  acetaminophen     Tablet .. 1000 milliGRAM(s) Oral every 8 hours  atorvastatin 10 milliGRAM(s) Oral at bedtime  bisacodyl 5 milliGRAM(s) Oral daily PRN  gabapentin 300 milliGRAM(s) Oral three times a day  HYDROmorphone  Injectable 0.5 milliGRAM(s) IV Push every 10 minutes PRN  lactated ringers. 1000 milliLiter(s) IV Continuous <Continuous>  levothyroxine 112 MICROGram(s) Oral daily  metoprolol succinate  milliGRAM(s) Oral daily  morphine  - Injectable 2 milliGRAM(s) IV Push every 4 hours PRN  morphine  - Injectable 4 milliGRAM(s) IV Push every 4 hours PRN  naloxone Injectable 0.4 milliGRAM(s) IV Push once  ondansetron Injectable 4 milliGRAM(s) IV Push once PRN  polyethylene glycol 3350 17 Gram(s) Oral daily  senna 2 Tablet(s) Oral at bedtime  sodium chloride 0.9%. 1000 milliLiter(s) IV Continuous <Continuous>      ASSESSMENT AND PLAN    89f, PMH as above a/w    #Mechanical fall/left hip fracture:  -s/p im nail pod#3  -pain control  -physical therapy post op  -incentive spirometry  -bowel regimen.    #Acute blood loss anemia:  -likely related to fracture/surgery  -transfused 1 unit yesterday by ortho.     #Vit D insufficiency:  -started supplement    #Hypothyroidism  -cont. Levothyroxine 112mcg po qam    #Hypertension  -continue bb with hold parameters  -to resume arb at rehab if bp stable.    #Hyperlipidemia  -continue statin.     #Peripheral Neuropathy  -cont. Gabapentin 300mg po tid    #Vte ppx  -lovenox    dispo:  rehab today

## 2023-08-30 NOTE — H&P ADULT - NS ATTEND AMEND GEN_ALL_CORE FT
Rehab Attending- Patient seen and examined by me - Case discussed, above note reviewed by me with modifications made    patient seen bedside  Physical exam as above  Rehab gait ab sp Left IT fracture SP ORIF- good candidate for intensive rehab- will tolerate three hours rehab daily  TYlenol ATC, ICE , PRN Tramadol for pain   B/B program  DVT proph- Lovenox    Check admit labs in AM

## 2023-08-30 NOTE — PROGRESS NOTE ADULT - SUBJECTIVE AND OBJECTIVE BOX
*** INCOMPLETE NOTE - CHARTING IN PROGRESS ***    Progress Note: Orthopaedic Surgery     Patient interviewed and examined at at bedside. Pain controlled. Denies fevers, chills, headaches, chest pain, or shortness of breath.     VITAL SIGNS:  T(C): 36.7 (29 Aug 2023 20:36), Max: 36.9 (29 Aug 2023 20:21)  T(F): 98.1 (29 Aug 2023 20:36), Max: 98.4 (29 Aug 2023 20:21)  HR: 86 (29 Aug 2023 20:36) (72 - 94)  BP: 109/45 (29 Aug 2023 20:36) (92/43 - 118/45)  RR: 18 (29 Aug 2023 20:36) (18 - 18)  SpO2: 95% (29 Aug 2023 20:36) (95% - 100%)  O2 Parameters below as of 29 Aug 2023 20:36  Patient On (Oxygen Delivery Method): room air      PHYSICAL EXAM:  General: NAD, pt laying in bed comfortably  SCDs in place BL  LLE:  Compartments soft, compressible  Calves nontender  Motor: +GSC, TA, EHL, FHL  SILT: SPN, DPN, Tib, Saph, Deana  +DP    ASSESSMENT:  89F status-post Left IMN POD3; Stable.     1U PRBC on 8/29  WBAT  PT/OT  Follow up AM labs  Analgesics  DVT PPx: Lovenox  Incentive spirometry  Dispo: AR susana [facility]; PMR recommending AR  Medical management appreciated  Will discuss plan w/ Dr. Kramer and change accordingly.     *** INCOMPLETE NOTE - CHARTING IN PROGRESS *** Patient seen and examined at at bedside. Pain controlled. Denies fevers, chills, headaches, chest pain, or shortness of breath.     LABS:                        8.4    17.24 )-----------( 163      ( 29 Aug 2023 12:48 )             25.5     08-29    139  |  109<H>  |  37<H>  ----------------------------<  104<H>  4.0   |  25  |  1.19    Ca    8.5      29 Aug 2023 07:36    Urinalysis Basic - ( 29 Aug 2023 07:36 )    Color: x / Appearance: x / SG: x / pH: x  Gluc: 104 mg/dL / Ketone: x  / Bili: x / Urobili: x   Blood: x / Protein: x / Nitrite: x   Leuk Esterase: x / RBC: x / WBC x   Sq Epi: x / Non Sq Epi: x / Bacteria: x      VITAL SIGNS:  T(C): 36.6 (08-30-23 @ 00:49), Max: 36.9 (08-29-23 @ 20:21)  HR: 83 (08-30-23 @ 00:49) (73 - 94)  BP: 112/45 (08-30-23 @ 00:49) (92/43 - 118/45)  RR: 18 (08-30-23 @ 00:49) (18 - 18)  SpO2: 99% (08-30-23 @ 00:49) (95% - 100%)    PHYSICAL EXAM:  General: NAD, pt laying in bed comfortably  SCDs in place BL    LLE:  Compartments soft, compressible  Calves nontender  Motor: +GSC, TA, EHL, FHL  SILT: SPN, DPN, Tib, Saph, Deana  +DP    ASSESSMENT:  89F status-post Left IMN POD3; Stable.     1U PRBC on 8/29  WBAT  PT/OT  Follow up AM labs  Analgesics  DVT PPx: Lovenox  Incentive spirometry  Dispo: AR v [facility]; PMR recommending AR  Medical management appreciated    Will discuss plan w/ Dr. Kramer and change accordingly.

## 2023-08-30 NOTE — DISCHARGE NOTE NURSING/CASE MANAGEMENT/SOCIAL WORK - PATIENT PORTAL LINK FT
You can access the FollowMyHealth Patient Portal offered by [facility] by registering at the following website: [email or web address]. By joining [facility]’s FollowMyHealth portal, you will also be able to view your health information using other applications (apps) compatible with our system.

## 2023-08-30 NOTE — H&P ADULT - NSHPREVIEWOFSYSTEMS_GEN_ALL_CORE
CONSTITUTIONAL: No fever, weight loss, or fatigue  EYES: No eye pain, visual disturbances, or discharge  ENMT:  No difficulty hearing, tinnitus, vertigo; No sinus or throat pain  NECK: No pain or stiffness  BREASTS: No pain, masses, or nipple discharge  RESPIRATORY: No cough, wheezing, chills or hemoptysis; No shortness of breath  CARDIOVASCULAR: No chest pain, palpitations, dizziness, or leg swelling  GASTROINTESTINAL: No abdominal or epigastric pain. No nausea, vomiting, or hematemesis; No diarrhea or constipation. No melena or hematochezia.  GENITOURINARY: No dysuria, frequency, hematuria, or incontinence  NEUROLOGICAL: No headaches, memory loss, loss of strength, hx neuropathy/heaviness in LEs  SKIN: No itching, burning, rashes, or lesions, BCC  LYMPH NODES: No enlarged glands  ENDOCRINE: No heat or cold intolerance; No hair loss  MUSCULOSKELETAL: No joint pain or swelling; No muscle, back, or extremity pain  PSYCHIATRIC: No depression, anxiety, mood swings, or difficulty sleeping  HEME/LYMPH: No easy bruising, or bleeding gums  ALLERY AND IMMUNOLOGIC: No hives or eczema

## 2023-08-30 NOTE — H&P ADULT - NSHPSOCIALHISTORY_GEN_ALL_CORE
FUNCTIONAL HISTORY  Lives with  who needs assist with ADLs    CURRENT FUNCTIONAL STATUS  Stands with maximum assist of 1 persons. takes several steps with walker FUNCTIONAL HISTORY  Lives with , ground level apt    CURRENT FUNCTIONAL STATUS  Stands with maximum assist of 1 persons. takes several steps with walker FUNCTIONAL HISTORY  Lives with - Condo- first floor no steps- independent without device in house- used walker outdoors    CURRENT FUNCTIONAL STATUS  Stands with maximum assist of 1 persons. takes several steps with walker

## 2023-08-30 NOTE — PROGRESS NOTE ADULT - PROVIDER SPECIALTY LIST ADULT
Hospitalist
Orthopedics
Hospitalist
Orthopedics
Orthopedics

## 2023-08-30 NOTE — PATIENT PROFILE ADULT - FUNCTIONAL ASSESSMENT - BASIC MOBILITY 6.
2-calculated by average/Not able to assess (calculate score using [facility] averaging method)

## 2023-08-30 NOTE — H&P ADULT - HISTORY OF PRESENT ILLNESS
89yo Female w/PMHx of HTN, hypothyroid/Graves, neuropathy, breast ca, BCC, community ambulator (walker) to ED HH on 8/26 from home s/p fall with severe left hip pain, inability to ambulate. XR hip shows left IT fracture. Ortho consulted. Underwent IM nail 8/27. Post op anemia- 1 unit PRBC. WBAT. Lovenox sq for DVT ppx. Leukocytosis w/up neg, UA neg. ARB held 2/2 hypotension. Evaluated by physical therapy/PMR and acute rehab recommended. Cleared for dc on 8/30.    CT Pelvis Bony Only No Cont (08.26.23 @ 19:57) >Acute impacted left femoral intertrochanteric fracture as described.

## 2023-08-30 NOTE — H&P ADULT - NSICDXFAMILYHX_GEN_ALL_CORE_FT
FAMILY HISTORY:  Mother  Still living? Unknown  Family hx of colon cancer, Age at diagnosis: Age Unknown    Sibling  Still living? Unknown  Family hx of colon cancer, Age at diagnosis: Age Unknown    Child  Still living? Unknown  Family history of breast cancer, Age at diagnosis: Age Unknown

## 2023-08-30 NOTE — H&P ADULT - NSICDXPASTMEDICALHX_GEN_ALL_CORE_FT
PAST MEDICAL HISTORY:  Adult hypothyroidism     Basal cell carcinoma (BCC)     Breast cancer     History of Graves' disease     History of peripheral neuropathy     HLD (hyperlipidemia)     HTN (hypertension)

## 2023-08-31 VITALS
RESPIRATION RATE: 18 BRPM | SYSTOLIC BLOOD PRESSURE: 166 MMHG | TEMPERATURE: 98 F | DIASTOLIC BLOOD PRESSURE: 83 MMHG | OXYGEN SATURATION: 100 % | HEART RATE: 88 BPM

## 2023-08-31 LAB
ALBUMIN SERPL ELPH-MCNC: 2.7 G/DL — LOW (ref 3.3–5)
ALP SERPL-CCNC: 62 U/L — SIGNIFICANT CHANGE UP (ref 40–120)
ALT FLD-CCNC: 22 U/L — SIGNIFICANT CHANGE UP (ref 10–45)
ANION GAP SERPL CALC-SCNC: 7 MMOL/L — SIGNIFICANT CHANGE UP (ref 5–17)
AST SERPL-CCNC: 34 U/L — SIGNIFICANT CHANGE UP (ref 10–40)
BASOPHILS # BLD AUTO: 0.07 K/UL — SIGNIFICANT CHANGE UP (ref 0–0.2)
BASOPHILS NFR BLD AUTO: 0.6 % — SIGNIFICANT CHANGE UP (ref 0–2)
BILIRUB SERPL-MCNC: 0.9 MG/DL — SIGNIFICANT CHANGE UP (ref 0.2–1.2)
BUN SERPL-MCNC: 19 MG/DL — SIGNIFICANT CHANGE UP (ref 7–23)
CALCIUM SERPL-MCNC: 9.2 MG/DL — SIGNIFICANT CHANGE UP (ref 8.4–10.5)
CHLORIDE SERPL-SCNC: 107 MMOL/L — SIGNIFICANT CHANGE UP (ref 96–108)
CO2 SERPL-SCNC: 28 MMOL/L — SIGNIFICANT CHANGE UP (ref 22–31)
CREAT SERPL-MCNC: 0.72 MG/DL — SIGNIFICANT CHANGE UP (ref 0.5–1.3)
EGFR: 79 ML/MIN/1.73M2 — SIGNIFICANT CHANGE UP
EOSINOPHIL # BLD AUTO: 0.46 K/UL — SIGNIFICANT CHANGE UP (ref 0–0.5)
EOSINOPHIL NFR BLD AUTO: 4.1 % — SIGNIFICANT CHANGE UP (ref 0–6)
GLUCOSE SERPL-MCNC: 103 MG/DL — HIGH (ref 70–99)
HCT VFR BLD CALC: 27.7 % — LOW (ref 34.5–45)
HGB BLD-MCNC: 9.1 G/DL — LOW (ref 11.5–15.5)
IMM GRANULOCYTES NFR BLD AUTO: 0.8 % — SIGNIFICANT CHANGE UP (ref 0–0.9)
LYMPHOCYTES # BLD AUTO: 2.26 K/UL — SIGNIFICANT CHANGE UP (ref 1–3.3)
LYMPHOCYTES # BLD AUTO: 20.2 % — SIGNIFICANT CHANGE UP (ref 13–44)
MCHC RBC-ENTMCNC: 31.1 PG — SIGNIFICANT CHANGE UP (ref 27–34)
MCHC RBC-ENTMCNC: 32.9 GM/DL — SIGNIFICANT CHANGE UP (ref 32–36)
MCV RBC AUTO: 94.5 FL — SIGNIFICANT CHANGE UP (ref 80–100)
MONOCYTES # BLD AUTO: 0.9 K/UL — SIGNIFICANT CHANGE UP (ref 0–0.9)
MONOCYTES NFR BLD AUTO: 8.1 % — SIGNIFICANT CHANGE UP (ref 2–14)
NEUTROPHILS # BLD AUTO: 7.39 K/UL — SIGNIFICANT CHANGE UP (ref 1.8–7.4)
NEUTROPHILS NFR BLD AUTO: 66.2 % — SIGNIFICANT CHANGE UP (ref 43–77)
NRBC # BLD: 0 /100 WBCS — SIGNIFICANT CHANGE UP (ref 0–0)
PLATELET # BLD AUTO: 186 K/UL — SIGNIFICANT CHANGE UP (ref 150–400)
POTASSIUM SERPL-MCNC: 4.3 MMOL/L — SIGNIFICANT CHANGE UP (ref 3.5–5.3)
POTASSIUM SERPL-SCNC: 4.3 MMOL/L — SIGNIFICANT CHANGE UP (ref 3.5–5.3)
PROT SERPL-MCNC: 6.8 G/DL — SIGNIFICANT CHANGE UP (ref 6–8.3)
RBC # BLD: 2.93 M/UL — LOW (ref 3.8–5.2)
RBC # FLD: 16 % — HIGH (ref 10.3–14.5)
SODIUM SERPL-SCNC: 142 MMOL/L — SIGNIFICANT CHANGE UP (ref 135–145)
WBC # BLD: 11.17 K/UL — HIGH (ref 3.8–10.5)
WBC # FLD AUTO: 11.17 K/UL — HIGH (ref 3.8–10.5)

## 2023-08-31 PROCEDURE — 99232 SBSQ HOSP IP/OBS MODERATE 35: CPT | Mod: GC

## 2023-08-31 PROCEDURE — 99223 1ST HOSP IP/OBS HIGH 75: CPT

## 2023-08-31 RX ADMIN — GABAPENTIN 300 MILLIGRAM(S): 400 CAPSULE ORAL at 13:04

## 2023-08-31 RX ADMIN — GABAPENTIN 300 MILLIGRAM(S): 400 CAPSULE ORAL at 06:11

## 2023-08-31 RX ADMIN — Medication 975 MILLIGRAM(S): at 06:46

## 2023-08-31 RX ADMIN — TRAMADOL HYDROCHLORIDE 50 MILLIGRAM(S): 50 TABLET ORAL at 08:33

## 2023-08-31 RX ADMIN — Medication 975 MILLIGRAM(S): at 14:03

## 2023-08-31 RX ADMIN — GABAPENTIN 300 MILLIGRAM(S): 400 CAPSULE ORAL at 21:19

## 2023-08-31 RX ADMIN — Medication 100 MILLIGRAM(S): at 06:11

## 2023-08-31 RX ADMIN — Medication 975 MILLIGRAM(S): at 06:10

## 2023-08-31 RX ADMIN — Medication 975 MILLIGRAM(S): at 21:19

## 2023-08-31 RX ADMIN — SENNA PLUS 2 TABLET(S): 8.6 TABLET ORAL at 21:20

## 2023-08-31 RX ADMIN — Medication 975 MILLIGRAM(S): at 22:00

## 2023-08-31 RX ADMIN — ATORVASTATIN CALCIUM 10 MILLIGRAM(S): 80 TABLET, FILM COATED ORAL at 21:20

## 2023-08-31 RX ADMIN — Medication 975 MILLIGRAM(S): at 13:03

## 2023-08-31 RX ADMIN — TRAMADOL HYDROCHLORIDE 50 MILLIGRAM(S): 50 TABLET ORAL at 09:33

## 2023-08-31 RX ADMIN — Medication 400 UNIT(S): at 12:06

## 2023-08-31 RX ADMIN — ENOXAPARIN SODIUM 40 MILLIGRAM(S): 100 INJECTION SUBCUTANEOUS at 06:10

## 2023-08-31 RX ADMIN — Medication 112 MICROGRAM(S): at 05:15

## 2023-08-31 NOTE — DIETITIAN INITIAL EVALUATION ADULT - PERTINENT MEDS FT
MEDICATIONS  (STANDING):  acetaminophen     Tablet .. 975 milliGRAM(s) Oral every 8 hours  atorvastatin 10 milliGRAM(s) Oral at bedtime  cholecalciferol 400 Unit(s) Oral daily  enoxaparin Injectable 40 milliGRAM(s) SubCutaneous every 24 hours  gabapentin 300 milliGRAM(s) Oral three times a day  levothyroxine 112 MICROGram(s) Oral daily  metoprolol succinate  milliGRAM(s) Oral daily  polyethylene glycol 3350 17 Gram(s) Oral daily  senna 2 Tablet(s) Oral at bedtime    MEDICATIONS  (PRN):  oxyCODONE    IR 5 milliGRAM(s) Oral every 6 hours PRN Severe Pain (7 - 10)  traMADol 50 milliGRAM(s) Oral every 6 hours PRN Moderate Pain (4 - 6)

## 2023-08-31 NOTE — DIETITIAN INITIAL EVALUATION ADULT - ORAL INTAKE PTA/DIET HISTORY
Patient Does Not Follow Diet @Home But Tries to Limit Salt/Sugar Intake  Consumes 2 Meals a Day   Usually Cooks For Self  Doesn't Take Vitamin/Supplements @Home

## 2023-08-31 NOTE — PROGRESS NOTE ADULT - ASSESSMENT
89yo Female s/p left IT fx s/p repair , now with decreased functional mobility, gait instability and ADL impairments.    COMORBIDITES/ACTIVE MEDICAL ISSUES     Gait Instability, ADL impairments and Functional impairments: start Comprehensive Rehab Program of PT/OT       #Mechanical fall/left hip fracture:  -s/p im nail, WBAT  -pain control w/Tylenol q8h, ice and prn Tramadol  -incentive spirometry  -bowel regimen.  -fall precautions  -start PT OT evaluations    #Acute blood loss anemia:  -blood loss, s/p 1u PRBC post op  -follow CBC     #Leukocytosis  -asymptomatic  -encourage PO fluids    #Vit D insufficiency:  - supplement    #Hypothyroidism  -cont. Levothyroxine 112mcg    #Hypertension  -continue bb with hold parameters  -arb/ace on hold for borderline bp    #Hyperlipidemia  -continue statin.     #Peripheral Neuropathy  -cont. Gabapentin 300mg po tid    #Vte ppx  -lovenox    Pain Mgmt - Tylenol PRN  GI/Bowel Mgmt -  Senna,  Miralax   /Bladder Mgmt - Voiding independently, PVRx1      Precautions / PROPHYLAXIS:   - Falls, Cardiac  - Ortho: Weight bearing status: WBAT   - Lungs: Aspiration, Incentive Spirometer   - Pressure injury/Skin: Turn Q2hrs while in bed, OOB to Chair, PT/OT    - DVT: Lovenox   89yo Female s/p left IT fx s/p repair , now with decreased functional mobility, gait instability and ADL impairments.    COMORBIDITES/ACTIVE MEDICAL ISSUES     Gait Instability, ADL impairments and Functional impairments: start Comprehensive Rehab Program of PT/OT       #Mechanical fall/left hip fracture:  -s/p im nail, WBAT  -pain control w/Tylenol q8h, ice and prn Tramadol  -incentive spirometry  -bowel regimen.  -fall precautions  -start PT OT evaluations    #Acute blood loss anemia:  -blood loss, s/p 1u PRBC post op  -Hgb stable- 9.1 8/31    #Leukocytosis  -asymptomatic  -encourage PO fluids  no apparent source-? stress related- follow CBC    #Vit D insufficiency:  - supplement    #Hypothyroidism  -cont. Levothyroxine 112mcg    #Hypertension  -continue bb with hold parameters  -arb/ace on hold for borderline bp  BPs OK on current dose of Toprol    #Hyperlipidemia  -continue statin.     #Peripheral Neuropathy  -cont. Gabapentin 300mg po tid    #Vte ppx  -lovenox    Pain Mgmt - Tylenol PRN  GI/Bowel Mgmt -  Senna,  Miralax   /Bladder Mgmt - Voiding independently, PVRx1      Precautions / PROPHYLAXIS:   - Falls, Cardiac  - Ortho: Weight bearing status: WBAT   - Lungs: Aspiration, Incentive Spirometer   - Pressure injury/Skin: Turn Q2hrs while in bed, OOB to Chair, PT/OT    - DVT: Lovenox

## 2023-08-31 NOTE — PROGRESS NOTE ADULT - SUBJECTIVE AND OBJECTIVE BOX
HPI:  89yo Female w/PMHx of HTN, hypothyroid/Graves, neuropathy, breast ca, BCC, community ambulator (walker) to ED HH on 8/26 from home s/p fall with severe left hip pain, inability to ambulate. XR hip shows left IT fracture. Ortho consulted. Underwent IM nail 8/27. Post op anemia- 1 unit PRBC. WBAT. Lovenox sq for DVT ppx. Leukocytosis w/up neg, UA neg. ARB held 2/2 hypotension. Evaluated by physical therapy/PMR and acute rehab recommended. Cleared for dc on 8/30.    CT Pelvis Bony Only No Cont (08.26.23 @ 19:57) >Acute impacted left femoral intertrochanteric fracture as described.       Patient seen bedside, NAD in chair, mild pain at hip-prn Tramadol-tolerating therapy  Rehab gait ab sp Left IT fracture SP ORIF  Tylenol ATC, ICE , PRN Tramadol for pain   B/B program  DVT proph- Lovenox  Afebrile, voids clear yellow urine      MEDICATIONS  (STANDING):  acetaminophen     Tablet .. 975 milliGRAM(s) Oral every 8 hours  atorvastatin 10 milliGRAM(s) Oral at bedtime  cholecalciferol 400 Unit(s) Oral daily  enoxaparin Injectable 40 milliGRAM(s) SubCutaneous every 24 hours  gabapentin 300 milliGRAM(s) Oral three times a day  levothyroxine 112 MICROGram(s) Oral daily  metoprolol succinate  milliGRAM(s) Oral daily  polyethylene glycol 3350 17 Gram(s) Oral daily  senna 2 Tablet(s) Oral at bedtime    MEDICATIONS  (PRN):  oxyCODONE    IR 5 milliGRAM(s) Oral every 6 hours PRN Severe Pain (7 - 10)  traMADol 50 milliGRAM(s) Oral every 6 hours PRN Moderate Pain (4 - 6)                            9.1    11.17 )-----------( 186      ( 31 Aug 2023 06:43 )             27.7     08-31    142  |  107  |  19  ----------------------------<  103<H>  4.3   |  28  |  0.72    Ca    9.2      31 Aug 2023 06:43    TPro  6.8  /  Alb  2.7<L>  /  TBili  0.9  /  DBili  x   /  AST  34  /  ALT  22  /  AlkPhos  62  08-31    Urinalysis Basic - ( 31 Aug 2023 06:43 )    Color: x / Appearance: x / SG: x / pH: x  Gluc: 103 mg/dL / Ketone: x  / Bili: x / Urobili: x   Blood: x / Protein: x / Nitrite: x   Leuk Esterase: x / RBC: x / WBC x   Sq Epi: x / Non Sq Epi: x / Bacteria: x        Vital Signs Last 24 Hrs  T(C): 36.4 (31 Aug 2023 09:04), Max: 37.2 (30 Aug 2023 20:23)  T(F): 97.5 (31 Aug 2023 09:04), Max: 99 (30 Aug 2023 20:23)  HR: 64 (31 Aug 2023 09:04) (64 - 88)  BP: 120/63 (31 Aug 2023 09:04) (120/63 - 129/73)  BP(mean): --  RR: 18 (31 Aug 2023 09:04) (16 - 18)  SpO2: 93% (31 Aug 2023 09:04) (93% - 96%)    Parameters below as of 31 Aug 2023 09:04  Patient On (Oxygen Delivery Method): room air      PHYSICAL EXAM  Constitutional - NAD, Comfortable  HEENT - NCAT, EOMI  Neck - Supple, No limited ROM  Chest - Breathing comfortably, No wheezing, clear lungs  Cardiovascular - S1S2   Abdomen - Soft, rounded, hyperactive  Extremities - No C/C/E, No calf tenderness   Neurologic Exam -                    Cognitive - AAO to self, place, date, year, situation     Communication - Fluent, No dysarthria     Cranial Nerves - CN 2-12 intact     Motor - No focal deficits                    LEFT    UE - ShAB 5/5, EF 5-/5, EE 5/5, WE 5/5,  5/5                    RIGHT UE - ShAB 5/5, EF 5/5, EE 5/5, WE 5/5,  5/5                    LEFT    LE - HF at least 2/5, KE 5/5, DF 5/5, PF 5/5                    RIGHT LE - HF 4/5, KE 5/5 DF 5/5, PF 5/5        Sensory - Intact to LT     Reflexes - DTR Intact     Coordination - FTN intact     Balance - impaired  Psychiatric - Mood stable, Affect WNL      Continue comprehensive acute rehab program consisting of 3hrs/day of OT/PT. HPI:  89yo Female w/PMHx of HTN, hypothyroid/Graves, neuropathy, breast ca, BCC, community ambulator (walker) to ED HH on 8/26 from home s/p fall with severe left hip pain, inability to ambulate. XR hip shows left IT fracture. Ortho consulted. Underwent IM nail 8/27. Post op anemia- 1 unit PRBC. WBAT. Lovenox sq for DVT ppx. Leukocytosis w/up neg, UA neg. ARB held 2/2 hypotension. Evaluated by physical therapy/PMR and acute rehab recommended. Cleared for dc on 8/30.    CT Pelvis Bony Only No Cont (08.26.23 @ 19:57) >Acute impacted left femoral intertrochanteric fracture as described.     ROS/subjective:  Patient seen bedside, NAD in chair, mild pain at hip-prn Tramadol-tolerating therapy  moved bowels again since admission yesterday  voiding  Tylenol ATC, ICE , PRN Tramadol for pain   no dizziness, no headaches  no SOB, no chest pain  no nausea, no vomiting  using primafit at night- urine clear      MEDICATIONS  (STANDING):  acetaminophen     Tablet .. 975 milliGRAM(s) Oral every 8 hours  atorvastatin 10 milliGRAM(s) Oral at bedtime  cholecalciferol 400 Unit(s) Oral daily  enoxaparin Injectable 40 milliGRAM(s) SubCutaneous every 24 hours  gabapentin 300 milliGRAM(s) Oral three times a day  levothyroxine 112 MICROGram(s) Oral daily  metoprolol succinate  milliGRAM(s) Oral daily  polyethylene glycol 3350 17 Gram(s) Oral daily  senna 2 Tablet(s) Oral at bedtime    MEDICATIONS  (PRN):  oxyCODONE    IR 5 milliGRAM(s) Oral every 6 hours PRN Severe Pain (7 - 10)  traMADol 50 milliGRAM(s) Oral every 6 hours PRN Moderate Pain (4 - 6)                            9.1    11.17 )-----------( 186      ( 31 Aug 2023 06:43 )             27.7     08-31    142  |  107  |  19  ----------------------------<  103<H>  4.3   |  28  |  0.72    Ca    9.2      31 Aug 2023 06:43    TPro  6.8  /  Alb  2.7<L>  /  TBili  0.9  /  DBili  x   /  AST  34  /  ALT  22  /  AlkPhos  62  08-31    Urinalysis Basic - ( 31 Aug 2023 06:43 )    Color: x / Appearance: x / SG: x / pH: x  Gluc: 103 mg/dL / Ketone: x  / Bili: x / Urobili: x   Blood: x / Protein: x / Nitrite: x   Leuk Esterase: x / RBC: x / WBC x   Sq Epi: x / Non Sq Epi: x / Bacteria: x        Vital Signs Last 24 Hrs  T(C): 36.4 (31 Aug 2023 09:04), Max: 37.2 (30 Aug 2023 20:23)  T(F): 97.5 (31 Aug 2023 09:04), Max: 99 (30 Aug 2023 20:23)  HR: 64 (31 Aug 2023 09:04) (64 - 88)  BP: 120/63 (31 Aug 2023 09:04) (120/63 - 129/73)  BP(mean): --  RR: 18 (31 Aug 2023 09:04) (16 - 18)  SpO2: 93% (31 Aug 2023 09:04) (93% - 96%)    Parameters below as of 31 Aug 2023 09:04  Patient On (Oxygen Delivery Method): room air      PHYSICAL EXAM  Constitutional - NAD, Comfortable  HEENT - NCAT, EOMI  Neck - Supple, No limited ROM  Chest - Breathing comfortably, No wheezing, clear lungs  Cardiovascular - S1S2   Abdomen - Soft, rounded, hyperactive  Extremities - No C/C/E, No calf tenderness  Left hip incision bandaged  Skin on sacrum is blanchable  Neurologic Exam -                    Cognitive - AAO to self, place, date, year, situation     Communication - Fluent, No dysarthria     Cranial Nerves - CN 2-12 intact     Motor - No focal deficits                    LEFT    UE - ShAB 5/5, EF 5-/5, EE 5/5, WE 5/5,  5/5                    RIGHT UE - ShAB 5/5, EF 5/5, EE 5/5, WE 5/5,  5/5                    LEFT    LE - HF at least 2/5, KE 5/5, DF 5/5, PF 5/5                    RIGHT LE - HF 4/5, KE 5/5 DF 5/5, PF 5/5        Sensory - Intact to LT     Reflexes - DTR Intact     Coordination - FTN intact     Balance - impaired  Psychiatric - Mood stable, Affect WNL      Continue comprehensive acute rehab program consisting of 3hrs/day of OT/PT.

## 2023-08-31 NOTE — CONSULT NOTE ADULT - SUBJECTIVE AND OBJECTIVE BOX
89yo Female w/PMHx of HTN, hypothyroid/Graves, neuropathy, breast ca, BCC, community ambulator (walker) to ED HH on 8/26 from home s/p fall with severe left hip pain, inability to ambulate. XR hip shows left IT fracture. Ortho consulted. Underwent IM nail 8/27. Post op anemia- 1 unit PRBC. WBAT. Lovenox sq for DVT ppx. Leukocytosis w/up neg, UA neg. ARB held 2/2 hypotension. Evaluated by physical therapy/PMR and acute rehab recommended. Cleared for dc on 8/30.    CT Pelvis Bony Only No Cont (08.26.23 @ 19:57) >Acute impacted left femoral intertrochanteric fracture as described.     PAST MEDICAL & SURGICAL HISTORY:  HTN (hypertension)  HLD (hyperlipidemia)  History of peripheral neuropathy  Breast cancer  Adult hypothyroidism  History of Graves' disease  Basal cell carcinoma (BCC)  History of cataract surgery  History of tonsillectomy  S/P dilation and curettage  Status post left breast lumpectomy    FAMILY HISTORY  Father: - at age - with history of   Mother: - at age - with history of     SOCIAL HISTORY  Substance Use (street drugs): (  ) never used  (  ) other:  Tobacco Usage:  (   ) never smoked   (   ) former smoker   (   ) current smoker  (     ) pack year  Alcohol Usage:  Sexual History:   Recent Travel:    Allergies  No Known Allergies  Intolerances    metoprolol succinate  milliGRAM(s) Oral daily    REVIEW OF SYSTEMS:  CONSTITUTIONAL: No fever, weight loss, or fatigue  EYES: No eye pain, visual disturbances, or discharge  ENMT:  No difficulty hearing, tinnitus, vertigo; No sinus or throat pain  NECK: No pain or stiffness  BREASTS: No pain, masses, or nipple discharge  RESPIRATORY: No cough, wheezing, chills or hemoptysis; No shortness of breath  CARDIOVASCULAR: No chest pain, palpitations, dizziness, or leg swelling  GASTROINTESTINAL: No abdominal or epigastric pain. No nausea, vomiting, or hematemesis; No diarrhea or constipation. No melena or hematochezia.  GENITOURINARY: No dysuria, frequency, hematuria, or incontinence  NEUROLOGICAL: No headaches, memory loss, loss of strength, numbness, or tremors  SKIN: No itching, burning, rashes, or lesions   LYMPH NODES: No enlarged glands  ENDOCRINE: No heat or cold intolerance; No hair loss  MUSCULOSKELETAL: No joint pain or swelling; No muscle, back, or extremity pain  PSYCHIATRIC: No depression, anxiety, mood swings, or difficulty sleeping  HEME/LYMPH: No easy bruising, or bleeding gums  ALLERY AND IMMUNOLOGIC: No hives or eczema    ALL ROS REVIEWED AND NORMAL EXCEPT AS STATED ABOVE    T(C): 37.2 (08-30-23 @ 20:23), Max: 37.2 (08-30-23 @ 20:23)  HR: 69 (08-31-23 @ 06:08) (67 - 88)  BP: 122/70 (08-31-23 @ 06:08) (109/50 - 129/73)  RR: 16 (08-30-23 @ 20:23) (16 - 18)  SpO2: 96% (08-30-23 @ 20:23) (92% - 96%)  Wt(kg): --Vital Signs Last 24 Hrs  T(C): 37.2 (30 Aug 2023 20:23), Max: 37.2 (30 Aug 2023 20:23)  T(F): 99 (30 Aug 2023 20:23), Max: 99 (30 Aug 2023 20:23)  HR: 69 (31 Aug 2023 06:08) (67 - 88)  BP: 122/70 (31 Aug 2023 06:08) (109/50 - 129/73)  BP(mean): --  RR: 16 (30 Aug 2023 20:23) (16 - 18)  SpO2: 96% (30 Aug 2023 20:23) (92% - 96%)    Parameters below as of 30 Aug 2023 20:23  Patient On (Oxygen Delivery Method): room air    PHYSICAL EXAM:  GENERAL: NAD, well-groomed, well-developed  HEAD:  Atraumatic, Normocephalic  EYES: EOMI, PERRLA, conjunctiva and sclera clear  ENMT: No tonsillar erythema, exudates, or enlargement; Moist mucous membranes, Good dentition, No lesions  NECK: Supple, No JVD, Normal thyroid  NERVOUS SYSTEM:  Alert & Oriented X3, Good concentration; Motor Strength 5/5 B/L upper and lower extremities; DTRs 2+ intact and symmetric  CHEST/LUNG: Clear to percussion bilaterally; No rales, rhonchi, wheezing, or rubs  HEART: Regular rate and rhythm; No murmurs, rubs, or gallops  ABDOMEN: Soft, Nontender, Nondistended; Bowel sounds present  EXTREMITIES:  2+ Peripheral Pulses, No clubbing, cyanosis, or edema  LYMPH: No lymphadenopathy noted  SKIN: No rashes or lesions    LABS:                        8.8    10.89 )-----------( 146      ( 30 Aug 2023 06:49 )             26.0     08-30    139  |  111<H>  |  30<H>  ----------------------------<  108<H>  4.2   |  25  |  0.78    Ca    8.5      30 Aug 2023 06:49    Urinalysis Basic - ( 30 Aug 2023 06:49 )    Color: x / Appearance: x / SG: x / pH: x  Gluc: 108 mg/dL / Ketone: x  / Bili: x / Urobili: x   Blood: x / Protein: x / Nitrite: x   Leuk Esterase: x / RBC: x / WBC x   Sq Epi: x / Non Sq Epi: x / Bacteria: x    CAPILLARY BLOOD GLUCOSE    Urinalysis Basic - ( 30 Aug 2023 06:49 )    Color: x / Appearance: x / SG: x / pH: x  Gluc: 108 mg/dL / Ketone: x  / Bili: x / Urobili: x   Blood: x / Protein: x / Nitrite: x   Leuk Esterase: x / RBC: x / WBC x   Sq Epi: x / Non Sq Epi: x / Bacteria: x        RADIOLOGY & ADDITIONAL TESTS:  Xray Chest 1 View- PORTABLE-Urgent (Xray Chest 1 View- PORTABLE-Urgent .) (08.26.23 @ 20:56) >    IMPRESSION: Intertrochanteric fracture left hip. Other findings as above.   Clear lungs.    Consultant(s) Notes Reviewed:  [x ] YES  [ ] NO  Care Discussed with Consultants/Other Providers [ x] YES  [ ] NO  Imaging Personally Reviewed:  [ ] YES  [x ] NO 89yo Female w/PMHx of HTN, hypothyroid/Graves, neuropathy, breast ca, BCC, community ambulator (walker) to ED HH on 8/26 from home s/p fall with severe left hip pain, inability to ambulate. XR hip shows left IT fracture. Ortho consulted. Underwent IM nail 8/27. Post op anemia- 1 unit PRBC. WBAT. Lovenox sq for DVT ppx. Leukocytosis w/up neg, UA neg. ARB held 2/2 hypotension. Evaluated by physical therapy/PMR and acute rehab recommended. Cleared for dc on 8/30.    CT Pelvis Bony Only No Cont (08.26.23 @ 19:57) >Acute impacted left femoral intertrochanteric fracture as described.     PAST MEDICAL & SURGICAL HISTORY:  HTN (hypertension)  HLD (hyperlipidemia)  History of peripheral neuropathy  Breast cancer  Adult hypothyroidism  History of Graves' disease  Basal cell carcinoma (BCC)  History of cataract surgery  History of tonsillectomy  S/P dilation and curettage  Status post left breast lumpectomy    FAMILY HISTORY  no FH of CAD, HTN, HLD, DM     SOCIAL HISTORY  Substance Use (street drugs): (x ) never used  (  ) other:  Tobacco Usage:  ( x  ) never smoked   (   ) former smoker   (   ) current smoker  (     ) pack year  Alcohol Usage:Denies  Sexual History:Denies  Recent Travel:Denies    Allergies  No Known Allergies  Intolerances    metoprolol succinate  milliGRAM(s) Oral daily    REVIEW OF SYSTEMS:  CONSTITUTIONAL: No fever, weight loss, or fatigue  EYES: No eye pain, visual disturbances, or discharge  ENMT:  No difficulty hearing, tinnitus, vertigo; No sinus or throat pain  NECK: No pain or stiffness  BREASTS: No pain, masses, or nipple discharge  RESPIRATORY: No cough, wheezing, chills or hemoptysis; No shortness of breath  CARDIOVASCULAR: No chest pain, palpitations, dizziness, or leg swelling  GASTROINTESTINAL: No abdominal or epigastric pain. No nausea, vomiting, or hematemesis; No diarrhea or constipation. No melena or hematochezia.  GENITOURINARY: No dysuria, frequency, hematuria, or incontinence  NEUROLOGICAL: No headaches, memory loss, loss of strength, numbness, or tremors  SKIN: No itching, burning, rashes, or lesions   LYMPH NODES: No enlarged glands  ENDOCRINE: No heat or cold intolerance; No hair loss  MUSCULOSKELETAL: No joint pain or swelling; No muscle, back, or extremity pain  PSYCHIATRIC: No depression, anxiety, mood swings, or difficulty sleeping  HEME/LYMPH: No easy bruising, or bleeding gums  ALLERY AND IMMUNOLOGIC: No hives or eczema    ALL ROS REVIEWED AND NORMAL EXCEPT AS STATED ABOVE    T(C): 37.2 (08-30-23 @ 20:23), Max: 37.2 (08-30-23 @ 20:23)  HR: 69 (08-31-23 @ 06:08) (67 - 88)  BP: 122/70 (08-31-23 @ 06:08) (109/50 - 129/73)  RR: 16 (08-30-23 @ 20:23) (16 - 18)  SpO2: 96% (08-30-23 @ 20:23) (92% - 96%)  Wt(kg): --Vital Signs Last 24 Hrs  T(C): 37.2 (30 Aug 2023 20:23), Max: 37.2 (30 Aug 2023 20:23)  T(F): 99 (30 Aug 2023 20:23), Max: 99 (30 Aug 2023 20:23)  HR: 69 (31 Aug 2023 06:08) (67 - 88)  BP: 122/70 (31 Aug 2023 06:08) (109/50 - 129/73)  BP(mean): --  RR: 16 (30 Aug 2023 20:23) (16 - 18)  SpO2: 96% (30 Aug 2023 20:23) (92% - 96%)    Parameters below as of 30 Aug 2023 20:23  Patient On (Oxygen Delivery Method): room air    PHYSICAL EXAM:  GENERAL: NAD, well-groomed, well-developed  HEAD:  Atraumatic, Normocephalic  EYES: EOMI, PERRLA, conjunctiva and sclera clear  ENMT: No tonsillar erythema, exudates, or enlargement; Moist mucous membranes, Good dentition, No lesions  NECK: Supple, No JVD, Normal thyroid  NERVOUS SYSTEM:  Alert & Oriented X3  CHEST/LUNG: Clear to percussion bilaterally; No rales, rhonchi, wheezing, or rubs  HEART: Regular rate and rhythm; No murmurs, rubs, or gallops  ABDOMEN: Soft, Nontender, Nondistended; Bowel sounds present  EXTREMITIES:  2+ Peripheral Pulses, No clubbing, cyanosis, or edema  LYMPH: No lymphadenopathy noted  SKIN: No rashes or lesions    LABS:                        8.8    10.89 )-----------( 146      ( 30 Aug 2023 06:49 )             26.0     08-30    139  |  111<H>  |  30<H>  ----------------------------<  108<H>  4.2   |  25  |  0.78    Ca    8.5      30 Aug 2023 06:49    Urinalysis Basic - ( 30 Aug 2023 06:49 )    Color: x / Appearance: x / SG: x / pH: x  Gluc: 108 mg/dL / Ketone: x  / Bili: x / Urobili: x   Blood: x / Protein: x / Nitrite: x   Leuk Esterase: x / RBC: x / WBC x   Sq Epi: x / Non Sq Epi: x / Bacteria: x    CAPILLARY BLOOD GLUCOSE    Urinalysis Basic - ( 30 Aug 2023 06:49 )    Color: x / Appearance: x / SG: x / pH: x  Gluc: 108 mg/dL / Ketone: x  / Bili: x / Urobili: x   Blood: x / Protein: x / Nitrite: x   Leuk Esterase: x / RBC: x / WBC x   Sq Epi: x / Non Sq Epi: x / Bacteria: x        RADIOLOGY & ADDITIONAL TESTS:  Xray Chest 1 View- PORTABLE-Urgent (Xray Chest 1 View- PORTABLE-Urgent .) (08.26.23 @ 20:56) >    IMPRESSION: Intertrochanteric fracture left hip. Other findings as above.   Clear lungs.    Consultant(s) Notes Reviewed:  [x ] YES  [ ] NO  Care Discussed with Consultants/Other Providers [ x] YES  [ ] NO  Imaging Personally Reviewed:  [ ] YES  [x ] NO

## 2023-08-31 NOTE — PROGRESS NOTE ADULT - NS ATTEND AMEND GEN_ALL_CORE FT
Rehab Attending- Patient seen and examined by me - Case discussed, above note reviewed by me with modifications made    patient seen bedside  Physical exam as above  Rehab gait ab sp Left IT fracture SP ORIF- good candidate for intensive rehab- will tolerate three hours rehab daily  TYlenol ATC, ICE , PRN Tramadol for pain   B/B program  DVT proph- Lovenox    Check admit labs in AM Rehab Attending- Patient seen and examined by me - Case discussed, above note reviewed by me with modifications made    patient seen and evaluated bedside  OOB to chair  Left Hip pain controlled with tylenol, tramadol  moving bowels, voiding clear urine- Primafit at night  labs reviewed by me- stable  to continue intensive rehab program    Vital Signs Last 24 Hrs  T(C): 36.4 (31 Aug 2023 09:04), Max: 37.2 (30 Aug 2023 20:23)  T(F): 97.5 (31 Aug 2023 09:04), Max: 99 (30 Aug 2023 20:23)  HR: 64 (31 Aug 2023 09:04) (64 - 88)  BP: 120/63 (31 Aug 2023 09:04) (120/63 - 129/73)  BP(mean): --  RR: 18 (31 Aug 2023 09:04) (16 - 18)  SpO2: 93% (31 Aug 2023 09:04) (93% - 96%)    Parameters below as of 31 Aug 2023 09:04  Patient On (Oxygen Delivery Method): room air      PHYSICAL EXAM  Constitutional - NAD, Comfortable  HEENT - NCAT, EOMI  Neck - Supple, No limited ROM  Chest - Breathing comfortably, No wheezing, clear lungs  Cardiovascular - S1S2   Abdomen - Soft, rounded, hyperactive  Extremities - No C/C/E, No calf tenderness  Left hip incision bandaged  Skin on sacrum is blanchable  Neurologic Exam -                    Cognitive - AAO to self, place, date, year, situation     Communication - Fluent, No dysarthria     Cranial Nerves - CN 2-12 intact     Motor - No focal deficits                    LEFT    UE - ShAB 5/5, EF 5-/5, EE 5/5, WE 5/5,  5/5                    RIGHT UE - ShAB 5/5, EF 5/5, EE 5/5, WE 5/5,  5/5                    LEFT    LE - HF at least 2/5, KE 5/5, DF 5/5, PF 5/5                    RIGHT LE - HF 4/5, KE 5/5 DF 5/5, PF 5/5        Sensory - Intact to LT     Reflexes - DTR Intact     Coordination - FTN intact     Balance - impaired  Psychiatric - Mood stable, Affect WNL

## 2023-08-31 NOTE — DIETITIAN INITIAL EVALUATION ADULT - PERTINENT LABORATORY DATA
08-31    142  |  107  |  19  ----------------------------<  103<H>  4.3   |  28  |  0.72    Ca    9.2      31 Aug 2023 06:43    TPro  6.8  /  Alb  2.7<L>  /  TBili  0.9  /  DBili  x   /  AST  34  /  ALT  22  /  AlkPhos  62  08-31

## 2023-08-31 NOTE — CONSULT NOTE ADULT - ASSESSMENT
89yo Female s/p left IT fx s/p repair , now with decreased functional mobility, gait instability and ADL impairments.    #Mechanical fall/left hip fracture:  -s/p im nail, WBAT  -pain control w/Tylenol q8h, ice and prn Tramadol  -incentive spirometry  -bowel regimen.    #Acute blood loss anemia:  -blood loss, s/p 1u PRBC post op  -follow CBC     #Vit D insufficiency:  - supplement    #Hypothyroidism  -cont. Levothyroxine 112mcg    #Hypertension  -continue bb with hold parameters  -arb/ace on hold for borderline bp    #Hyperlipidemia  -continue statin.     #Peripheral Neuropathy  -cont. Gabapentin 300mg po tid    DVT PPX: lovenox   89yo Female s/p left IT fx s/p repair , now with decreased functional mobility, gait instability and ADL impairments.    #Mechanical fall/left hip fracture:  -s/p im nail, WBAT  -pain control w/Tylenol q8h, ice and prn Tramadol  -incentive spirometry  -bowel regimen.    #Acute blood loss anemia, stable  -blood loss, s/p 1u PRBC post op  -follow CBC   -Transfuse if Hg <7  -Will check iron/B12/folate with next lab draw    #Vit D insufficiency:  - supplement    #Hypothyroidism  -cont. Levothyroxine 112mcg    #Hypertension  -continue bb with hold parameters  -arb/ace on hold for borderline bp    #Hyperlipidemia  -continue statin.     #Peripheral Neuropathy  -cont. Gabapentin 300mg po tid    DVT PPX: lovenox

## 2023-08-31 NOTE — CHART NOTE - NSCHARTNOTEFT_GEN_A_CORE
[facility] Plan of Care    REHABILITATION DIAGNOSIS:  Nondisplaced intertrochanteric fracture of left femur, initial encounter for closed fracture          COMORBIDITIES/COMPLICATING CONDITIONS IMPACTING REHABILITATION:  HEALTH ISSUES - PROBLEM Dx:  peripheral neuropathy  post op anemia      PAST MEDICAL & SURGICAL HISTORY:  HTN (hypertension)      HLD (hyperlipidemia)      History of peripheral neuropathy      Breast cancer      Adult hypothyroidism      History of Graves' disease      Basal cell carcinoma (BCC)      History of cataract surgery      History of tonsillectomy      S/P dilation and curettage      Status post left breast lumpectomy          Based upon consideration of the patient's impairments, functional status, complicating conditions and any other contributing factors and after information garnered from the assessments of all therapy disciplines involved in treating the patient and other pertinent clinicians:    INTERDISCIPLINARY REHABILITATION INTERVENTIONS:    [ X  ] Transfer Training  [ X  ] Bed Mobility  [ X  ] Therapeutic Exercise  [ X ] Balance/Coordination Exercises  [ X ] Locomotion retraining  [ X  ] Stairs  [  X ] Functional Transfer Training  [ x  ] Bowel/Bladder program  [  x ] Pain Management  [   x] Skin/Wound Care  [   ] Visual/Perceptual Training  [   ] Therapeutic Recreation Activities  [   ] Neuromuscular Re-education  [ X  ] Activities of Daily Living  [   ] Speech Exercise  [   ] Swallowing Exercises  [   ] Vital Stim  [   ] Dietary Supplements  [   ] Calorie Count  [   ] Cognitive Exercises  [   ] Congnitive/Linguistic Treatment  [   ] Behavior Program  [   ] Neuropsych Therapy  [ X  ] Patient/Family Counseling  [ X ] Family Training  [ X  ] Community Re-entry  [   ] Orthotic Evaluation  [   ] Prosthetic Eval/Training    MEDICAL PROGNOSIS:  good    REHAB POTENTIAL:  good  EXPECTED DAILY THERAPY:         PT:2hr       OT:1hr       ST:       P&O:    EXPECTED INTENSITY OF PROGRAM:  3 hrs / Day    EXPECTED FREQUENCY OF PROGRAM: 5 Days/ Week    ESTIMATED LOS:  [  ] 5-7 Days  [  ] 7-10 Days  [x  ] 10- 14 Days  [  ] 14- 18 Days  [  ] 18- 21 Days    ESTIMATED DISPOSITION:  [  ] Home   [  ] Home with Outpatient Therapies  [  x] Home with Home Therapies  [  ] Assisted Living  [  ] Nursing Home  [  ] Long Term Acute Care    INTERDISCIPLINARY FUNCTIONAL OUTCOMES/GOALS:         Gait/Mobility: Mod I       Transfers: Mod I       ADLs: Mod I       Functional Transfers: Mod I       Medication Management: independent       Communication: NA       Cognitive: NA       Dysphagia: NA       Bladder independent       Bowel: independent     Functional Independent Measures:   7 = Independent  6 = Modified Independent  5 = Supervision  4 = Minimal Assist/ Contact Guard  3 = Moderate Assistance  2 = Maximum Assistance  1 = Total Assistance  0 = Unable to assess

## 2023-08-31 NOTE — DIETITIAN INITIAL EVALUATION ADULT - FACTORS AFF FOOD INTAKE
States Good PO Intake/Appetite over Last Week  Denies Changes In Meal Consumption (Per Patient)/none

## 2023-08-31 NOTE — DIETITIAN INITIAL EVALUATION ADULT - OTHER INFO
Initial Nutrition Assessment   89yr Old Female   Denies Food Allergy/Intolerance  Tolerates Diet Well - No Chewing/Swallowing Complications (Per Patient)  Surgical Incision on Left Hip & Stage 1 Pressure Ulcers Intergluteal Cleft (as Per Nursing Flow Sheets)  None Pitting Edema Noted too Left Lower Extremity (as Per Nursing Flow Sheets)  No Recent Nausea/Vomiting/Diarrhea/Constipation (as Per Patient)

## 2023-09-01 PROCEDURE — 99232 SBSQ HOSP IP/OBS MODERATE 35: CPT | Mod: GC

## 2023-09-01 PROCEDURE — 99232 SBSQ HOSP IP/OBS MODERATE 35: CPT

## 2023-09-01 RX ADMIN — Medication 975 MILLIGRAM(S): at 13:40

## 2023-09-01 RX ADMIN — GABAPENTIN 300 MILLIGRAM(S): 400 CAPSULE ORAL at 06:25

## 2023-09-01 RX ADMIN — Medication 975 MILLIGRAM(S): at 13:03

## 2023-09-01 RX ADMIN — GABAPENTIN 300 MILLIGRAM(S): 400 CAPSULE ORAL at 21:35

## 2023-09-01 RX ADMIN — GABAPENTIN 300 MILLIGRAM(S): 400 CAPSULE ORAL at 13:02

## 2023-09-01 RX ADMIN — Medication 975 MILLIGRAM(S): at 07:00

## 2023-09-01 RX ADMIN — Medication 975 MILLIGRAM(S): at 22:35

## 2023-09-01 RX ADMIN — Medication 400 UNIT(S): at 11:22

## 2023-09-01 RX ADMIN — Medication 975 MILLIGRAM(S): at 21:35

## 2023-09-01 RX ADMIN — ATORVASTATIN CALCIUM 10 MILLIGRAM(S): 80 TABLET, FILM COATED ORAL at 21:35

## 2023-09-01 RX ADMIN — Medication 100 MILLIGRAM(S): at 06:25

## 2023-09-01 RX ADMIN — TRAMADOL HYDROCHLORIDE 50 MILLIGRAM(S): 50 TABLET ORAL at 11:24

## 2023-09-01 RX ADMIN — Medication 975 MILLIGRAM(S): at 06:26

## 2023-09-01 RX ADMIN — Medication 112 MICROGRAM(S): at 05:14

## 2023-09-01 RX ADMIN — ENOXAPARIN SODIUM 40 MILLIGRAM(S): 100 INJECTION SUBCUTANEOUS at 05:15

## 2023-09-01 RX ADMIN — TRAMADOL HYDROCHLORIDE 50 MILLIGRAM(S): 50 TABLET ORAL at 12:15

## 2023-09-01 NOTE — PROGRESS NOTE ADULT - ASSESSMENT
89yo Female s/p left IT fx s/p repair , now with decreased functional mobility, gait instability and ADL impairments.    #Mechanical fall/left hip fracture:  -s/p im nail, WBAT  -pain control w/Tylenol q8h, ice and prn Tramadol  -incentive spirometry  -bowel regimen.    #Acute blood loss anemia, stable  -blood loss, s/p 1u PRBC post op  -follow CBC   -Transfuse if Hg <7  -Will check iron/B12/folate with next lab draw    #Vit D insufficiency:  - supplement    #Hypothyroidism  -cont. Levothyroxine 112mcg    #Hypertension  -continue bb with hold parameters  -arb/ace on hold for borderline bp    #Hyperlipidemia  -continue statin.     #Peripheral Neuropathy  -cont. Gabapentin 300mg po tid    DVT PPX: lovenox

## 2023-09-01 NOTE — PROGRESS NOTE ADULT - ASSESSMENT
89yo Female s/p left IT fx s/p repair , now with decreased functional mobility, gait instability and ADL impairments.    COMORBIDITES/ACTIVE MEDICAL ISSUES     Gait Instability, ADL impairments and Functional impairments: Comprehensive Rehab Program of PT/OT       #Mechanical fall/left hip fracture:  -s/p im nail, WBAT  -pain control w/Tylenol q8h, ice and prn Tramadol  -incentive spirometry  -bowel regimen.  -fall precautions  - PT OT evaluations    #Acute blood loss anemia:  -blood loss, s/p 1u PRBC post op  -Hgb stable- 9.1 8/31    #Leukocytosis  -asymptomatic  -encourage PO fluids  no apparent source-? stress related- follow CBC    #Vit D insufficiency:  - supplement    #Hypothyroidism  -cont. Levothyroxine 112mcg    #Hypertension  -continue bb with hold parameters  -arb/ace on hold for borderline bp  BPs OK on current dose of Toprol    #Hyperlipidemia  -continue statin.     #Peripheral Neuropathy  -cont. Gabapentin 300mg po tid    #Vte ppx  -lovenox    Pain Mgmt - Tylenol PRN  GI/Bowel Mgmt -  Senna,  Miralax   /Bladder Mgmt - Voiding independently, PVRx1      Precautions / PROPHYLAXIS:   - Falls, Cardiac  - Ortho: Weight bearing status: WBAT   - Lungs: Aspiration, Incentive Spirometer   - Pressure injury/Skin: Turn Q2hrs while in bed, OOB to Chair, PT/OT    - DVT: Lovenox

## 2023-09-01 NOTE — PROGRESS NOTE ADULT - SUBJECTIVE AND OBJECTIVE BOX
Patient is a 89y old  Female who presents with a chief complaint of Nondisplaced intertrochanteric fracture of unspecified femur, initial encounter for closed fracture     (31 Aug 2023 12:37)      Patient seen and examined at bedside.    ALLERGIES:  No Known Allergies    MEDICATIONS  (STANDING):  acetaminophen     Tablet .. 975 milliGRAM(s) Oral every 8 hours  atorvastatin 10 milliGRAM(s) Oral at bedtime  cholecalciferol 400 Unit(s) Oral daily  enoxaparin Injectable 40 milliGRAM(s) SubCutaneous every 24 hours  gabapentin 300 milliGRAM(s) Oral three times a day  levothyroxine 112 MICROGram(s) Oral daily  metoprolol succinate  milliGRAM(s) Oral daily  polyethylene glycol 3350 17 Gram(s) Oral daily  senna 2 Tablet(s) Oral at bedtime    MEDICATIONS  (PRN):  oxyCODONE    IR 5 milliGRAM(s) Oral every 6 hours PRN Severe Pain (7 - 10)  traMADol 50 milliGRAM(s) Oral every 6 hours PRN Moderate Pain (4 - 6)    Vital Signs Last 24 Hrs  T(F): 98.8 (01 Sep 2023 08:03), Max: 98.8 (01 Sep 2023 08:03)  HR: 60 (01 Sep 2023 08:03) (60 - 69)  BP: 144/65 (01 Sep 2023 08:03) (123/68 - 144/65)  RR: 16 (01 Sep 2023 08:03) (16 - 16)  SpO2: 95% (01 Sep 2023 08:03) (95% - 96%)  I&O's Summary    BMI (kg/m2): 31.6 (08-30-23 @ 15:00)  PHYSICAL EXAM:  GENERAL: NAD, well-groomed, well-developed  HEAD:  Atraumatic, Normocephalic  EYES: EOMI, PERRLA, conjunctiva and sclera clear  ENMT: No tonsillar erythema, exudates, or enlargement; Moist mucous membranes, Good dentition, No lesions  NECK: Supple, No JVD, Normal thyroid  NERVOUS SYSTEM:  Alert & Oriented X3  CHEST/LUNG: Clear to percussion bilaterally; No rales, rhonchi, wheezing, or rubs  HEART: Regular rate and rhythm; No murmurs, rubs, or gallops  ABDOMEN: Soft, Nontender, Nondistended; Bowel sounds present  EXTREMITIES:  2+ Peripheral Pulses, No clubbing, cyanosis, or edema  LYMPH: No lymphadenopathy noted  SKIN: No rashes or lesions      LABS:                        9.1    11.17 )-----------( 186      ( 31 Aug 2023 06:43 )             27.7       08-31    142  |  107  |  19  ----------------------------<  103  4.3   |  28  |  0.72    Ca    9.2      31 Aug 2023 06:43    TPro  6.8  /  Alb  2.7  /  TBili  0.9  /  DBili  x   /  AST  34  /  ALT  22  /  AlkPhos  62  08-31     Urinalysis Basic - ( 31 Aug 2023 06:43 )    Color: x / Appearance: x / SG: x / pH: x  Gluc: 103 mg/dL / Ketone: x  / Bili: x / Urobili: x   Blood: x / Protein: x / Nitrite: x   Leuk Esterase: x / RBC: x / WBC x   Sq Epi: x / Non Sq Epi: x / Bacteria: x

## 2023-09-01 NOTE — PROGRESS NOTE ADULT - NS ATTEND AMEND GEN_ALL_CORE FT
Rehab Attending- Patient seen and examined by me - Case discussed, above note reviewed by me with modifications made    patient seen and evaluated bedside  OOB to chair  Left Hip pain controlled with tylenol, tramadol  moving bowels, voiding clear urine- Primafit at night  labs reviewed by me- stable  to continue intensive rehab program    Vital Signs Last 24 Hrs  T(C): 36.4 (31 Aug 2023 09:04), Max: 37.2 (30 Aug 2023 20:23)  T(F): 97.5 (31 Aug 2023 09:04), Max: 99 (30 Aug 2023 20:23)  HR: 64 (31 Aug 2023 09:04) (64 - 88)  BP: 120/63 (31 Aug 2023 09:04) (120/63 - 129/73)  BP(mean): --  RR: 18 (31 Aug 2023 09:04) (16 - 18)  SpO2: 93% (31 Aug 2023 09:04) (93% - 96%)    Parameters below as of 31 Aug 2023 09:04  Patient On (Oxygen Delivery Method): room air      PHYSICAL EXAM  Constitutional - NAD, Comfortable  HEENT - NCAT, EOMI  Neck - Supple, No limited ROM  Chest - Breathing comfortably, No wheezing, clear lungs  Cardiovascular - S1S2   Abdomen - Soft, rounded, hyperactive  Extremities - No C/C/E, No calf tenderness  Left hip incision bandaged  Skin on sacrum is blanchable  Neurologic Exam -                    Cognitive - AAO to self, place, date, year, situation     Communication - Fluent, No dysarthria     Cranial Nerves - CN 2-12 intact     Motor - No focal deficits                    LEFT    UE - ShAB 5/5, EF 5-/5, EE 5/5, WE 5/5,  5/5                    RIGHT UE - ShAB 5/5, EF 5/5, EE 5/5, WE 5/5,  5/5                    LEFT    LE - HF at least 2/5, KE 5/5, DF 5/5, PF 5/5                    RIGHT LE - HF 4/5, KE 5/5 DF 5/5, PF 5/5        Sensory - Intact to LT     Reflexes - DTR Intact     Coordination - FTN intact     Balance - impaired  Psychiatric - Mood stable, Affect WNL Rehab Attending- Patient seen and examined by me - Case discussed, above note reviewed by me with modifications made    patient seen and evaluated bedside  OOB to chair  Left Hip pain controlled with tylenol, tramadol  moving bowels, voiding clear urine- Primafit at night  discussed in team- tenative Dc 9/14  to continue intensive rehab program    Vital Signs Last 24 Hrs  T(C): 37.1 (01 Sep 2023 08:03), Max: 37.1 (01 Sep 2023 08:03)  T(F): 98.8 (01 Sep 2023 08:03), Max: 98.8 (01 Sep 2023 08:03)  HR: 60 (01 Sep 2023 08:03) (60 - 69)  BP: 144/65 (01 Sep 2023 08:03) (123/68 - 144/65)  BP(mean): --  RR: 16 (01 Sep 2023 08:03) (16 - 16)  SpO2: 95% (01 Sep 2023 08:03) (95% - 96%)    Parameters below as of 01 Sep 2023 08:03  Patient On (Oxygen Delivery Method): room air      PHYSICAL EXAM  Constitutional - NAD, Comfortable  HEENT - NCAT, EOMI  Neck - Supple, No limited ROM  Chest - Breathing comfortably, No wheezing, clear lungs  Cardiovascular - S1S2   Abdomen - Soft, rounded, hyperactive  Extremities - No C/C/E, No calf tenderness  Left hip incision bandaged  Skin on sacrum is blanchable  Neurologic Exam -                    Cognitive - AAO to self, place, date, year, situation     Communication - Fluent, No dysarthria     Cranial Nerves - CN 2-12 intact     Motor - No focal deficits                    LEFT    UE - ShAB 5/5, EF 5-/5, EE 5/5, WE 5/5,  5/5                    RIGHT UE - ShAB 5/5, EF 5/5, EE 5/5, WE 5/5,  5/5                    LEFT    LE - HF at least 2/5, KE 5/5, DF 5/5, PF 5/5                    RIGHT LE - HF 4/5, KE 5/5 DF 5/5, PF 5/5        Sensory - Intact to LT     Reflexes - DTR Intact     Coordination - FTN intact     Balance - impaired  Psychiatric - Mood stable, Affect WNL

## 2023-09-01 NOTE — PROGRESS NOTE ADULT - SUBJECTIVE AND OBJECTIVE BOX
HPI:  89yo Female w/PMHx of HTN, hypothyroid/Graves, neuropathy, breast ca, BCC, community ambulator (walker) to ED HH on 8/26 from home s/p fall with severe left hip pain, inability to ambulate. XR hip shows left IT fracture. Ortho consulted. Underwent IM nail 8/27. Post op anemia- 1 unit PRBC. WBAT. Lovenox sq for DVT ppx. Leukocytosis w/up neg, UA neg. ARB held 2/2 hypotension. Evaluated by physical therapy/PMR and acute rehab recommended. Cleared for dc on 8/30.    CT Pelvis Bony Only No Cont (08.26.23 @ 19:57) >Acute impacted left femoral intertrochanteric fracture as described.       ROS/subjective:  Patient seen bedside, NAD in chair, mild pain at hip-prn Tramadol-tolerating therapy  moved bowels again since admission yesterday  voiding  Tylenol ATC, ICE , PRN Tramadol for pain   no dizziness, no headaches  no SOB, no chest pain  no nausea, no vomiting  using primafit at night- urine clear       MEDICATIONS  (STANDING):  acetaminophen     Tablet .. 975 milliGRAM(s) Oral every 8 hours  atorvastatin 10 milliGRAM(s) Oral at bedtime  cholecalciferol 400 Unit(s) Oral daily  enoxaparin Injectable 40 milliGRAM(s) SubCutaneous every 24 hours  gabapentin 300 milliGRAM(s) Oral three times a day  levothyroxine 112 MICROGram(s) Oral daily  metoprolol succinate  milliGRAM(s) Oral daily  polyethylene glycol 3350 17 Gram(s) Oral daily  senna 2 Tablet(s) Oral at bedtime    MEDICATIONS  (PRN):  oxyCODONE    IR 5 milliGRAM(s) Oral every 6 hours PRN Severe Pain (7 - 10)  traMADol 50 milliGRAM(s) Oral every 6 hours PRN Moderate Pain (4 - 6)                            9.1    11.17 )-----------( 186      ( 31 Aug 2023 06:43 )             27.7     08-31    142  |  107  |  19  ----------------------------<  103<H>  4.3   |  28  |  0.72    Ca    9.2      31 Aug 2023 06:43    TPro  6.8  /  Alb  2.7<L>  /  TBili  0.9  /  DBili  x   /  AST  34  /  ALT  22  /  AlkPhos  62  08-31    Urinalysis Basic - ( 31 Aug 2023 06:43 )    Color: x / Appearance: x / SG: x / pH: x  Gluc: 103 mg/dL / Ketone: x  / Bili: x / Urobili: x   Blood: x / Protein: x / Nitrite: x   Leuk Esterase: x / RBC: x / WBC x   Sq Epi: x / Non Sq Epi: x / Bacteria: x      Vital Signs Last 24 Hrs  T(C): 37.1 (01 Sep 2023 08:03), Max: 37.1 (01 Sep 2023 08:03)  T(F): 98.8 (01 Sep 2023 08:03), Max: 98.8 (01 Sep 2023 08:03)  HR: 60 (01 Sep 2023 08:03) (60 - 69)  BP: 144/65 (01 Sep 2023 08:03) (123/68 - 144/65)  BP(mean): --  RR: 16 (01 Sep 2023 08:03) (16 - 16)  SpO2: 95% (01 Sep 2023 08:03) (95% - 96%)    Parameters below as of 01 Sep 2023 08:03  Patient On (Oxygen Delivery Method): room air        PHYSICAL EXAM  Constitutional - NAD, Comfortable  HEENT - NCAT, EOMI  Neck - Supple, No limited ROM  Chest - Breathing comfortably, No wheezing, clear lungs  Cardiovascular - S1S2   Abdomen - Soft, rounded, hyperactive  Extremities - No C/C/E, No calf tenderness  Left hip incision bandaged  Skin on sacrum is blanchable  Neurologic Exam -                    Cognitive - AAO to self, place, date, year, situation     Communication - Fluent, No dysarthria     Cranial Nerves - CN 2-12 intact     Motor - No focal deficits                    LEFT    UE - ShAB 5/5, EF 5-/5, EE 5/5, WE 5/5,  5/5                    RIGHT UE - ShAB 5/5, EF 5/5, EE 5/5, WE 5/5,  5/5                    LEFT    LE - HF at least 2/5, KE 5/5, DF 5/5, PF 5/5                    RIGHT LE - HF 4/5, KE 5/5 DF 5/5, PF 5/5        Sensory - Intact to LT     Reflexes - DTR Intact     Coordination - FTN intact     Balance - impaired  Psychiatric - Mood stable, Affect WNL    PHYSICAL EXAM  Constitutional - NAD, Comfortable  HEENT - NCAT, EOMI  Neck - Supple, No limited ROM  Chest - Breathing comfortably, No wheezing, clear lungs  Cardiovascular - S1S2   Abdomen - Soft, rounded, hyperactive  Extremities - No C/C/E, No calf tenderness   Neurologic Exam -                    Cognitive - AAO to self, place, date, year, situation     Communication - Fluent, No dysarthria     Cranial Nerves - CN 2-12 intact     Motor - No focal deficits                    LEFT    UE - ShAB 5/5, EF 5-/5, EE 5/5, WE 5/5,  5/5                    RIGHT UE - ShAB 5/5, EF 5/5, EE 5/5, WE 5/5,  5/5                    LEFT    LE - HF at least 2/5, KE 5/5, DF 5/5, PF 5/5                    RIGHT LE - HF 4/5, KE 5/5 DF 5/5, PF 5/5        Sensory - Intact to LT     Reflexes - DTR Intact     Coordination - FTN intact     Balance - impaired  Psychiatric - Mood stable, Affect WNL    IDT meeting on 9/1    SW: Apt, ground level,     OT:  eating set up  grooming set up  UBD/LBD set up/min A  toileting min A  tub/shower min A  bathing min A    PT:  amb 40 min RW  transfers min A  stairs    SLP na    tentative dc home c HC 9/14      Continue comprehensive acute rehab program consisting of 3hrs/day of OT/PT. HPI:  89yo Female w/PMHx of HTN, hypothyroid/Graves, neuropathy, breast ca, BCC, community ambulator (walker) to ED HH on 8/26 from home s/p fall with severe left hip pain, inability to ambulate. XR hip shows left IT fracture. Ortho consulted. Underwent IM nail 8/27. Post op anemia- 1 unit PRBC. WBAT. Lovenox sq for DVT ppx. Leukocytosis w/up neg, UA neg. ARB held 2/2 hypotension. Evaluated by physical therapy/PMR and acute rehab recommended. Cleared for dc on 8/30.    CT Pelvis Bony Only No Cont (08.26.23 @ 19:57) >Acute impacted left femoral intertrochanteric fracture as described.       ROS/subjective:  Patient seen bedside, NAD in chair, mild pain at hip-prn Tramadol-tolerating therapy.  moved bowels   voiding well  Tylenol ATC, ICE , PRN Tramadol for pain   no dizziness, no headaches  no SOB, no chest pain  no nausea, no vomiting  using primafit at night- urine clear       MEDICATIONS  (STANDING):  acetaminophen     Tablet .. 975 milliGRAM(s) Oral every 8 hours  atorvastatin 10 milliGRAM(s) Oral at bedtime  cholecalciferol 400 Unit(s) Oral daily  enoxaparin Injectable 40 milliGRAM(s) SubCutaneous every 24 hours  gabapentin 300 milliGRAM(s) Oral three times a day  levothyroxine 112 MICROGram(s) Oral daily  metoprolol succinate  milliGRAM(s) Oral daily  polyethylene glycol 3350 17 Gram(s) Oral daily  senna 2 Tablet(s) Oral at bedtime    MEDICATIONS  (PRN):  oxyCODONE    IR 5 milliGRAM(s) Oral every 6 hours PRN Severe Pain (7 - 10)  traMADol 50 milliGRAM(s) Oral every 6 hours PRN Moderate Pain (4 - 6)                            9.1    11.17 )-----------( 186      ( 31 Aug 2023 06:43 )             27.7     08-31    142  |  107  |  19  ----------------------------<  103<H>  4.3   |  28  |  0.72    Ca    9.2      31 Aug 2023 06:43    TPro  6.8  /  Alb  2.7<L>  /  TBili  0.9  /  DBili  x   /  AST  34  /  ALT  22  /  AlkPhos  62  08-31    Urinalysis Basic - ( 31 Aug 2023 06:43 )    Color: x / Appearance: x / SG: x / pH: x  Gluc: 103 mg/dL / Ketone: x  / Bili: x / Urobili: x   Blood: x / Protein: x / Nitrite: x   Leuk Esterase: x / RBC: x / WBC x   Sq Epi: x / Non Sq Epi: x / Bacteria: x      Vital Signs Last 24 Hrs  T(C): 37.1 (01 Sep 2023 08:03), Max: 37.1 (01 Sep 2023 08:03)  T(F): 98.8 (01 Sep 2023 08:03), Max: 98.8 (01 Sep 2023 08:03)  HR: 60 (01 Sep 2023 08:03) (60 - 69)  BP: 144/65 (01 Sep 2023 08:03) (123/68 - 144/65)  BP(mean): --  RR: 16 (01 Sep 2023 08:03) (16 - 16)  SpO2: 95% (01 Sep 2023 08:03) (95% - 96%)    Parameters below as of 01 Sep 2023 08:03  Patient On (Oxygen Delivery Method): room air        PHYSICAL EXAM  Constitutional - NAD, Comfortable  HEENT - NCAT, EOMI  Neck - Supple, No limited ROM  Chest - Breathing comfortably, No wheezing, clear lungs  Cardiovascular - S1S2   Abdomen - Soft, rounded, hyperactive  Extremities - No C/C/E, No calf tenderness  Left hip incision bandaged  Skin on sacrum is blanchable  Neurologic Exam -                    Cognitive - AAO to self, place, date, year, situation     Communication - Fluent, No dysarthria     Cranial Nerves - CN 2-12 intact     Motor - No focal deficits                    LEFT    UE - ShAB 5/5, EF 5-/5, EE 5/5, WE 5/5,  5/5                    RIGHT UE - ShAB 5/5, EF 5/5, EE 5/5, WE 5/5,  5/5                    LEFT    LE - HF at least 2/5, KE 5/5, DF 5/5, PF 5/5                    RIGHT LE - HF 4/5, KE 5/5 DF 5/5, PF 5/5        Sensory - Intact to LT     Reflexes - DTR Intact     Coordination - FTN intact     Balance - impaired  Psychiatric - Mood stable, Affect WNL    PHYSICAL EXAM  Constitutional - NAD, Comfortable  HEENT - NCAT, EOMI  Neck - Supple, No limited ROM  Chest - Breathing comfortably, No wheezing, clear lungs  Cardiovascular - S1S2   Abdomen - Soft, rounded, hyperactive  Extremities - No C/C/E, No calf tenderness   Neurologic Exam -                    Cognitive - AAO to self, place, date, year, situation     Communication - Fluent, No dysarthria     Cranial Nerves - CN 2-12 intact     Motor - No focal deficits                    LEFT    UE - ShAB 5/5, EF 5-/5, EE 5/5, WE 5/5,  5/5                    RIGHT UE - ShAB 5/5, EF 5/5, EE 5/5, WE 5/5,  5/5                    LEFT    LE - HF at least 2/5, KE 5/5, DF 5/5, PF 5/5                    RIGHT LE - HF 4/5, KE 5/5 DF 5/5, PF 5/5        Sensory - Intact to LT     Reflexes - DTR Intact     Coordination - FTN intact     Balance - impaired  Psychiatric - Mood stable, Affect WNL    IDT meeting on 9/1    SW: Apt, ground level,     OT:  eating set up  grooming set up  UBD/LBD set up/min A  toileting min A  tub/shower min A  bathing min A    PT:  amb 40 min RW  transfers min A  stairs    SLP na    tentative dc home c HC 9/14      Continue comprehensive acute rehab program consisting of 3hrs/day of OT/PT.

## 2023-09-02 PROCEDURE — 99232 SBSQ HOSP IP/OBS MODERATE 35: CPT

## 2023-09-02 RX ADMIN — Medication 400 UNIT(S): at 13:01

## 2023-09-02 RX ADMIN — GABAPENTIN 300 MILLIGRAM(S): 400 CAPSULE ORAL at 05:24

## 2023-09-02 RX ADMIN — GABAPENTIN 300 MILLIGRAM(S): 400 CAPSULE ORAL at 15:03

## 2023-09-02 RX ADMIN — Medication 975 MILLIGRAM(S): at 21:25

## 2023-09-02 RX ADMIN — Medication 975 MILLIGRAM(S): at 05:23

## 2023-09-02 RX ADMIN — Medication 975 MILLIGRAM(S): at 13:02

## 2023-09-02 RX ADMIN — GABAPENTIN 300 MILLIGRAM(S): 400 CAPSULE ORAL at 21:24

## 2023-09-02 RX ADMIN — ATORVASTATIN CALCIUM 10 MILLIGRAM(S): 80 TABLET, FILM COATED ORAL at 21:26

## 2023-09-02 RX ADMIN — Medication 975 MILLIGRAM(S): at 22:25

## 2023-09-02 RX ADMIN — TRAMADOL HYDROCHLORIDE 50 MILLIGRAM(S): 50 TABLET ORAL at 09:49

## 2023-09-02 RX ADMIN — Medication 112 MICROGRAM(S): at 05:24

## 2023-09-02 RX ADMIN — ENOXAPARIN SODIUM 40 MILLIGRAM(S): 100 INJECTION SUBCUTANEOUS at 05:24

## 2023-09-02 RX ADMIN — SENNA PLUS 2 TABLET(S): 8.6 TABLET ORAL at 21:25

## 2023-09-02 RX ADMIN — POLYETHYLENE GLYCOL 3350 17 GRAM(S): 17 POWDER, FOR SOLUTION ORAL at 13:01

## 2023-09-02 RX ADMIN — TRAMADOL HYDROCHLORIDE 50 MILLIGRAM(S): 50 TABLET ORAL at 10:45

## 2023-09-02 RX ADMIN — Medication 975 MILLIGRAM(S): at 14:00

## 2023-09-02 RX ADMIN — Medication 975 MILLIGRAM(S): at 06:23

## 2023-09-02 RX ADMIN — Medication 100 MILLIGRAM(S): at 05:24

## 2023-09-02 NOTE — PROGRESS NOTE ADULT - SUBJECTIVE AND OBJECTIVE BOX
Pt. seen and examined at bedside.  No overnight events.      REVIEW OF SYSTEMS  Constitutional - No fever,  No fatigue  Neurological - No headaches, No loss of strength  Musculoskeletal - ++  joint pain, No joint swelling, No muscle pain    VITALS  T(C): 36.7 (09-02-23 @ 07:36), Max: 37.1 (09-01-23 @ 19:48)  HR: 55 (09-02-23 @ 07:36) (55 - 66)  BP: 128/75 (09-02-23 @ 07:36) (128/75 - 135/79)  RR: 16 (09-02-23 @ 07:36) (15 - 16)  SpO2: 94% (09-02-23 @ 07:36) (94% - 95%)  Wt(kg): --       MEDICATIONS   acetaminophen     Tablet .. 975 milliGRAM(s) every 8 hours  atorvastatin 10 milliGRAM(s) at bedtime  cholecalciferol 400 Unit(s) daily  enoxaparin Injectable 40 milliGRAM(s) every 24 hours  gabapentin 300 milliGRAM(s) three times a day  levothyroxine 112 MICROGram(s) daily  metoprolol succinate  milliGRAM(s) daily  oxyCODONE    IR 5 milliGRAM(s) every 6 hours PRN  polyethylene glycol 3350 17 Gram(s) daily  senna 2 Tablet(s) at bedtime  traMADol 50 milliGRAM(s) every 6 hours PRN      RECENT LABS/IMAGING                        ---------  PHYSICAL EXAM  Constitutional - NAD, Comfortable  Pulm - Breathing comfortably, No wheezing  Abd - Soft, NTND  Left Hip - incision C/D/I  Extremities - No edema, No calf tenderness  Neurologic Exam -                    Cognitive - Awake, Alert     Communication - Fluent     Motor - No focal deficits     Sensory - Intact to LT  Psychiatric - Mood WNL, Affect WNL    ASSESSMENT/PLAN  89y Female with functional deficits after left hip fx s/p ORIF.    Continue current medical management  Pain - Tylenol PRN; oxycodone; gabapentin  DVT PPX - enoxaparin Injectable 40 milliGRAM(s) every 24 hours  Active issues - none  Continue 3hrs a day of comprehensive rehab program.

## 2023-09-02 NOTE — PROGRESS NOTE ADULT - SUBJECTIVE AND OBJECTIVE BOX
Hospitalist: Eliseo Mims DO    CHIEF COMPLAINT: Patient is a 89y old  female who presents with a chief complaint of s/p left IT fx s/p repair (02 Sep 2023 11:21)      SUBJECTIVE / OVERNIGHT EVENTS: Patient seen and examined. No acute events overnight. Pain well controlled and patient without any complaints.    MEDICATIONS  (STANDING):  acetaminophen     Tablet .. 975 milliGRAM(s) Oral every 8 hours  atorvastatin 10 milliGRAM(s) Oral at bedtime  cholecalciferol 400 Unit(s) Oral daily  enoxaparin Injectable 40 milliGRAM(s) SubCutaneous every 24 hours  gabapentin 300 milliGRAM(s) Oral three times a day  levothyroxine 112 MICROGram(s) Oral daily  metoprolol succinate  milliGRAM(s) Oral daily  polyethylene glycol 3350 17 Gram(s) Oral daily  senna 2 Tablet(s) Oral at bedtime    MEDICATIONS  (PRN):  oxyCODONE    IR 5 milliGRAM(s) Oral every 6 hours PRN Severe Pain (7 - 10)  traMADol 50 milliGRAM(s) Oral every 6 hours PRN Moderate Pain (4 - 6)      VITALS:  T(F): 98.1 (09-02-23 @ 07:36), Max: 98.8 (09-01-23 @ 19:48)  HR: 55 (09-02-23 @ 07:36) (55 - 66)  BP: 128/75 (09-02-23 @ 07:36) (128/75 - 135/79)  RR: 16 (09-02-23 @ 07:36) (15 - 16)  SpO2: 94% (09-02-23 @ 07:36)      REVIEW OF SYSTEMS:  For ROV please refer back to H&P     PHYSICAL EXAM:  GENERAL: NAD, well-groomed, well-developed  HEAD:  Atraumatic, Normocephalic  EYES: EOMI, PERRLA, conjunctiva and sclera clear  ENMT: No tonsillar erythema, exudates, or enlargement; Moist mucous membranes, Good dentition, No lesions  NECK: Supple, No JVD, Normal thyroid  NERVOUS SYSTEM:  Alert & Oriented X3  CHEST/LUNG: Clear to percussion bilaterally; No rales, rhonchi, wheezing, or rubs  HEART: Regular rate and rhythm; No murmurs, rubs, or gallops  ABDOMEN: Soft, Nontender, Nondistended; Bowel sounds present  EXTREMITIES:  2+ Peripheral Pulses, No clubbing, cyanosis, or edema  LYMPH: No lymphadenopathy noted  SKIN: No rashes or lesions      RADIOLOGY & ADDITIONAL TESTS:    Imaging Personally Reviewed:    [X] Consultant(s) Notes Reviewed:  [X] Care Discussed with Consultants/Other Providers:

## 2023-09-03 PROCEDURE — 99232 SBSQ HOSP IP/OBS MODERATE 35: CPT

## 2023-09-03 RX ADMIN — Medication 975 MILLIGRAM(S): at 07:04

## 2023-09-03 RX ADMIN — GABAPENTIN 300 MILLIGRAM(S): 400 CAPSULE ORAL at 13:22

## 2023-09-03 RX ADMIN — Medication 400 UNIT(S): at 12:10

## 2023-09-03 RX ADMIN — Medication 975 MILLIGRAM(S): at 14:50

## 2023-09-03 RX ADMIN — Medication 975 MILLIGRAM(S): at 22:39

## 2023-09-03 RX ADMIN — Medication 975 MILLIGRAM(S): at 06:26

## 2023-09-03 RX ADMIN — Medication 975 MILLIGRAM(S): at 13:52

## 2023-09-03 RX ADMIN — Medication 975 MILLIGRAM(S): at 21:57

## 2023-09-03 RX ADMIN — Medication 112 MICROGRAM(S): at 05:02

## 2023-09-03 RX ADMIN — GABAPENTIN 300 MILLIGRAM(S): 400 CAPSULE ORAL at 21:57

## 2023-09-03 RX ADMIN — Medication 100 MILLIGRAM(S): at 06:26

## 2023-09-03 RX ADMIN — ENOXAPARIN SODIUM 40 MILLIGRAM(S): 100 INJECTION SUBCUTANEOUS at 06:27

## 2023-09-03 RX ADMIN — ATORVASTATIN CALCIUM 10 MILLIGRAM(S): 80 TABLET, FILM COATED ORAL at 21:58

## 2023-09-03 RX ADMIN — GABAPENTIN 300 MILLIGRAM(S): 400 CAPSULE ORAL at 06:32

## 2023-09-03 NOTE — PROGRESS NOTE ADULT - SUBJECTIVE AND OBJECTIVE BOX
Pt. seen and examined at bedside.  No overnight events.  Legs feel tight.        REVIEW OF SYSTEMS  Constitutional - No fever,  No fatigue  Neurological - No headaches, No loss of strength  Musculoskeletal - ++ joint pain, No joint swelling, No muscle pain    VITALS  T(C): 36.6 (09-03-23 @ 08:28), Max: 36.9 (09-02-23 @ 21:21)  HR: 78 (09-03-23 @ 08:28) (63 - 80)  BP: 104/70 (09-03-23 @ 08:28) (104/70 - 125/66)  RR: 16 (09-03-23 @ 08:28) (16 - 16)  SpO2: 94% (09-03-23 @ 08:28) (94% - 99%)  Wt(kg): --       MEDICATIONS   acetaminophen     Tablet .. 975 milliGRAM(s) every 8 hours  atorvastatin 10 milliGRAM(s) at bedtime  cholecalciferol 400 Unit(s) daily  enoxaparin Injectable 40 milliGRAM(s) every 24 hours  gabapentin 300 milliGRAM(s) three times a day  levothyroxine 112 MICROGram(s) daily  metoprolol succinate  milliGRAM(s) daily  oxyCODONE    IR 5 milliGRAM(s) every 6 hours PRN  polyethylene glycol 3350 17 Gram(s) daily  senna 2 Tablet(s) at bedtime  traMADol 50 milliGRAM(s) every 6 hours PRN      RECENT LABS/IMAGING                        ---------  PHYSICAL EXAM  Constitutional - NAD, Comfortable  Pulm - Breathing comfortably, No wheezing  Abd - Soft, NTND  Left Hip - incision C/D/I  Extremities - No edema, No calf tenderness  Neurologic Exam -                    Cognitive - Awake, Alert     Communication - Fluent     Motor - No focal deficits     Sensory - Intact to LT  Psychiatric - Mood WNL, Affect WNL    ASSESSMENT/PLAN  89y Female with functional deficits left hip IT fx s/p ORIF.    Continue current medical management  Pain - Tylenol PRN; KENN; TRAMADOL PRN.  DVT PPX - enoxaparin Injectable 40 milliGRAM(s) every 24 hours  Active issues - none  Continue 3hrs a day of comprehensive rehab program.

## 2023-09-03 NOTE — PROGRESS NOTE ADULT - SUBJECTIVE AND OBJECTIVE BOX
Hospitalist: Eliseo Mims DO    CHIEF COMPLAINT: Patient is a 89y old  female who presents with a chief complaint of s/p left IT fx s/p repair (03 Sep 2023 10:10)      SUBJECTIVE / OVERNIGHT EVENTS: Patient seen and examined. No acute events overnight. Pain well controlled and patient without any complaints.    MEDICATIONS  (STANDING):  acetaminophen     Tablet .. 975 milliGRAM(s) Oral every 8 hours  atorvastatin 10 milliGRAM(s) Oral at bedtime  cholecalciferol 400 Unit(s) Oral daily  enoxaparin Injectable 40 milliGRAM(s) SubCutaneous every 24 hours  gabapentin 300 milliGRAM(s) Oral three times a day  levothyroxine 112 MICROGram(s) Oral daily  metoprolol succinate  milliGRAM(s) Oral daily  polyethylene glycol 3350 17 Gram(s) Oral daily  senna 2 Tablet(s) Oral at bedtime    MEDICATIONS  (PRN):  oxyCODONE    IR 5 milliGRAM(s) Oral every 6 hours PRN Severe Pain (7 - 10)  traMADol 50 milliGRAM(s) Oral every 6 hours PRN Moderate Pain (4 - 6)      VITALS:  T(F): 97.9 (09-03-23 @ 08:28), Max: 98.4 (09-02-23 @ 21:21)  HR: 78 (09-03-23 @ 08:28) (63 - 80)  BP: 104/70 (09-03-23 @ 08:28) (104/70 - 125/66)  RR: 16 (09-03-23 @ 08:28) (16 - 16)  SpO2: 94% (09-03-23 @ 08:28)      REVIEW OF SYSTEMS:  For ROV please refer back to H&P     PHYSICAL EXAM:  GENERAL: NAD, well-groomed, well-developed  HEAD:  Atraumatic, Normocephalic  EYES: EOMI, PERRLA, conjunctiva and sclera clear  ENMT: No tonsillar erythema, exudates, or enlargement; Moist mucous membranes, Good dentition, No lesions  NECK: Supple, No JVD, Normal thyroid  NERVOUS SYSTEM:  Alert & Oriented X3  CHEST/LUNG: Clear to percussion bilaterally; No rales, rhonchi, wheezing, or rubs  HEART: Regular rate and rhythm; No murmurs, rubs, or gallops  ABDOMEN: Soft, Nontender, Nondistended; Bowel sounds present  EXTREMITIES:  2+ Peripheral Pulses, No clubbing, cyanosis, or edema  LYMPH: No lymphadenopathy noted  SKIN: No rashes or lesions    RADIOLOGY & ADDITIONAL TESTS:    Imaging Personally Reviewed:    [X] Consultant(s) Notes Reviewed:  [X] Care Discussed with Consultants/Other Providers:

## 2023-09-04 PROCEDURE — 99232 SBSQ HOSP IP/OBS MODERATE 35: CPT

## 2023-09-04 RX ADMIN — Medication 112 MICROGRAM(S): at 05:09

## 2023-09-04 RX ADMIN — Medication 975 MILLIGRAM(S): at 22:06

## 2023-09-04 RX ADMIN — Medication 975 MILLIGRAM(S): at 14:24

## 2023-09-04 RX ADMIN — Medication 975 MILLIGRAM(S): at 15:20

## 2023-09-04 RX ADMIN — ENOXAPARIN SODIUM 40 MILLIGRAM(S): 100 INJECTION SUBCUTANEOUS at 05:57

## 2023-09-04 RX ADMIN — ATORVASTATIN CALCIUM 10 MILLIGRAM(S): 80 TABLET, FILM COATED ORAL at 22:06

## 2023-09-04 RX ADMIN — POLYETHYLENE GLYCOL 3350 17 GRAM(S): 17 POWDER, FOR SOLUTION ORAL at 11:44

## 2023-09-04 RX ADMIN — Medication 100 MILLIGRAM(S): at 05:58

## 2023-09-04 RX ADMIN — TRAMADOL HYDROCHLORIDE 50 MILLIGRAM(S): 50 TABLET ORAL at 08:15

## 2023-09-04 RX ADMIN — Medication 975 MILLIGRAM(S): at 05:57

## 2023-09-04 RX ADMIN — SENNA PLUS 2 TABLET(S): 8.6 TABLET ORAL at 22:06

## 2023-09-04 RX ADMIN — GABAPENTIN 300 MILLIGRAM(S): 400 CAPSULE ORAL at 05:57

## 2023-09-04 RX ADMIN — GABAPENTIN 300 MILLIGRAM(S): 400 CAPSULE ORAL at 22:07

## 2023-09-04 RX ADMIN — GABAPENTIN 300 MILLIGRAM(S): 400 CAPSULE ORAL at 14:24

## 2023-09-04 RX ADMIN — Medication 400 UNIT(S): at 11:44

## 2023-09-04 RX ADMIN — TRAMADOL HYDROCHLORIDE 50 MILLIGRAM(S): 50 TABLET ORAL at 09:15

## 2023-09-04 NOTE — PROGRESS NOTE ADULT - SUBJECTIVE AND OBJECTIVE BOX
Pt. seen and examined at bedside.  No overnight events.      REVIEW OF SYSTEMS  Constitutional - No fever,  No fatigue  Neurological - No headaches, ++ loss of strength  Musculoskeletal - ++ joint pain, No joint swelling, No muscle pain    VITALS  T(C): 36.8 (09-04-23 @ 08:12), Max: 36.8 (09-04-23 @ 08:12)  HR: 84 (09-04-23 @ 08:12) (64 - 84)  BP: 121/69 (09-04-23 @ 08:12) (121/69 - 127/72)  RR: 16 (09-04-23 @ 08:12) (16 - 16)  SpO2: 96% (09-04-23 @ 08:12) (96% - 97%)  Wt(kg): --       MEDICATIONS   acetaminophen     Tablet .. 975 milliGRAM(s) every 8 hours  atorvastatin 10 milliGRAM(s) at bedtime  cholecalciferol 400 Unit(s) daily  enoxaparin Injectable 40 milliGRAM(s) every 24 hours  gabapentin 300 milliGRAM(s) three times a day  levothyroxine 112 MICROGram(s) daily  metoprolol succinate  milliGRAM(s) daily  oxyCODONE    IR 5 milliGRAM(s) every 6 hours PRN  polyethylene glycol 3350 17 Gram(s) daily  senna 2 Tablet(s) at bedtime  traMADol 50 milliGRAM(s) every 6 hours PRN      RECENT LABS/IMAGING                        ---------  PHYSICAL EXAM  Constitutional - NAD, Comfortable  Pulm - Breathing comfortably, No wheezing  Abd - Soft, NTND  Left hip - incision C/D/I  Extremities - No edema, No calf tenderness  Neurologic Exam -                    Cognitive - Awake, Alert     Communication - Fluent     Motor - No focal deficits     Sensory - Intact to LT  Psychiatric - Mood WNL, Affect WNL    ASSESSMENT/PLAN  89y Female with functional deficits after left hip IT fx s/p ORIF.    Continue current medical management  Pain - Tylenol PRN; oxycodone prn; mariluz  DVT PPX - enoxaparin Injectable 40 milliGRAM(s) every 24 hours  Active issues - none  Continue 3hrs a day of comprehensive rehab program.

## 2023-09-04 NOTE — PROGRESS NOTE ADULT - SUBJECTIVE AND OBJECTIVE BOX
Hospitalist: Eliseo Mims DO    CHIEF COMPLAINT: Patient is a 89y old  female who presents with a chief complaint of s/p left IT fx s/p repair (04 Sep 2023 09:20)      SUBJECTIVE / OVERNIGHT EVENTS: Patient seen and examined. No acute events overnight. Pain well controlled and patient without any complaints.    MEDICATIONS  (STANDING):  acetaminophen     Tablet .. 975 milliGRAM(s) Oral every 8 hours  atorvastatin 10 milliGRAM(s) Oral at bedtime  cholecalciferol 400 Unit(s) Oral daily  enoxaparin Injectable 40 milliGRAM(s) SubCutaneous every 24 hours  gabapentin 300 milliGRAM(s) Oral three times a day  levothyroxine 112 MICROGram(s) Oral daily  metoprolol succinate  milliGRAM(s) Oral daily  polyethylene glycol 3350 17 Gram(s) Oral daily  senna 2 Tablet(s) Oral at bedtime    MEDICATIONS  (PRN):  oxyCODONE    IR 5 milliGRAM(s) Oral every 6 hours PRN Severe Pain (7 - 10)  traMADol 50 milliGRAM(s) Oral every 6 hours PRN Moderate Pain (4 - 6)      VITALS:  T(F): 98.2 (09-04-23 @ 08:12), Max: 98.2 (09-04-23 @ 08:12)  HR: 84 (09-04-23 @ 08:12) (64 - 84)  BP: 121/69 (09-04-23 @ 08:12) (121/69 - 127/72)  RR: 16 (09-04-23 @ 08:12) (16 - 16)  SpO2: 96% (09-04-23 @ 08:12)      REVIEW OF SYSTEMS:  For ROV please refer back to H&P     PHYSICAL EXAM:  GENERAL: NAD, well-groomed, well-developed  HEAD:  Atraumatic, Normocephalic  EYES: EOMI, PERRLA, conjunctiva and sclera clear  ENMT: No tonsillar erythema, exudates, or enlargement; Moist mucous membranes, Good dentition, No lesions  NECK: Supple, No JVD, Normal thyroid  NERVOUS SYSTEM:  Alert & Oriented X3  CHEST/LUNG: Clear to percussion bilaterally; No rales, rhonchi, wheezing, or rubs  HEART: Regular rate and rhythm; No murmurs, rubs, or gallops  ABDOMEN: Soft, Nontender, Nondistended; Bowel sounds present  EXTREMITIES:  2+ Peripheral Pulses, No clubbing, cyanosis, or edema    RADIOLOGY & ADDITIONAL TESTS:    Imaging Personally Reviewed:    [X] Consultant(s) Notes Reviewed:  [X] Care Discussed with Consultants/Other Providers:

## 2023-09-04 NOTE — PROGRESS NOTE ADULT - ASSESSMENT
89yo Female s/p left IT fx s/p repair , now with decreased functional mobility, gait instability and ADL impairments.    #Mechanical fall/left hip fracture:  -s/p im nail, WBAT  -pain control w/Tylenol q8h, ice and prn Tramadol  -incentive spirometry  -bowel regimen.    #Acute blood loss anemia, stable  -blood loss, s/p 1u PRBC post op  -follow CBC   -Transfuse if Hg <7  -Will check iron/B12/folate with next lab draw    #Vit D insufficiency:  - supplement    #Hypothyroidism  -cont. Levothyroxine 112mcg    #Hypertension  -continue bb with hold parameters  -arb/ace on hold for borderline bp    #Hyperlipidemia  -continue statin.     #Peripheral Neuropathy  -cont. Gabapentin 300mg po tid    DVT PPX: lovenox

## 2023-09-05 LAB
ALBUMIN SERPL ELPH-MCNC: 2.9 G/DL — LOW (ref 3.3–5)
ALP SERPL-CCNC: 81 U/L — SIGNIFICANT CHANGE UP (ref 40–120)
ALT FLD-CCNC: 28 U/L — SIGNIFICANT CHANGE UP (ref 10–45)
ANION GAP SERPL CALC-SCNC: 7 MMOL/L — SIGNIFICANT CHANGE UP (ref 5–17)
AST SERPL-CCNC: 26 U/L — SIGNIFICANT CHANGE UP (ref 10–40)
BILIRUB SERPL-MCNC: 0.8 MG/DL — SIGNIFICANT CHANGE UP (ref 0.2–1.2)
BUN SERPL-MCNC: 25 MG/DL — HIGH (ref 7–23)
CALCIUM SERPL-MCNC: 9.3 MG/DL — SIGNIFICANT CHANGE UP (ref 8.4–10.5)
CHLORIDE SERPL-SCNC: 102 MMOL/L — SIGNIFICANT CHANGE UP (ref 96–108)
CO2 SERPL-SCNC: 28 MMOL/L — SIGNIFICANT CHANGE UP (ref 22–31)
CREAT SERPL-MCNC: 0.89 MG/DL — SIGNIFICANT CHANGE UP (ref 0.5–1.3)
EGFR: 61 ML/MIN/1.73M2 — SIGNIFICANT CHANGE UP
GLUCOSE SERPL-MCNC: 97 MG/DL — SIGNIFICANT CHANGE UP (ref 70–99)
HCT VFR BLD CALC: 29.5 % — LOW (ref 34.5–45)
HGB BLD-MCNC: 9.4 G/DL — LOW (ref 11.5–15.5)
MCHC RBC-ENTMCNC: 30.8 PG — SIGNIFICANT CHANGE UP (ref 27–34)
MCHC RBC-ENTMCNC: 31.9 GM/DL — LOW (ref 32–36)
MCV RBC AUTO: 96.7 FL — SIGNIFICANT CHANGE UP (ref 80–100)
NRBC # BLD: 0 /100 WBCS — SIGNIFICANT CHANGE UP (ref 0–0)
PLATELET # BLD AUTO: 302 K/UL — SIGNIFICANT CHANGE UP (ref 150–400)
POTASSIUM SERPL-MCNC: 4.7 MMOL/L — SIGNIFICANT CHANGE UP (ref 3.5–5.3)
POTASSIUM SERPL-SCNC: 4.7 MMOL/L — SIGNIFICANT CHANGE UP (ref 3.5–5.3)
PROT SERPL-MCNC: 6.6 G/DL — SIGNIFICANT CHANGE UP (ref 6–8.3)
RBC # BLD: 3.05 M/UL — LOW (ref 3.8–5.2)
RBC # FLD: 16.2 % — HIGH (ref 10.3–14.5)
SODIUM SERPL-SCNC: 137 MMOL/L — SIGNIFICANT CHANGE UP (ref 135–145)
WBC # BLD: 10.36 K/UL — SIGNIFICANT CHANGE UP (ref 3.8–10.5)
WBC # FLD AUTO: 10.36 K/UL — SIGNIFICANT CHANGE UP (ref 3.8–10.5)

## 2023-09-05 PROCEDURE — 99232 SBSQ HOSP IP/OBS MODERATE 35: CPT | Mod: GC

## 2023-09-05 PROCEDURE — 93971 EXTREMITY STUDY: CPT | Mod: 26,LT

## 2023-09-05 RX ADMIN — Medication 400 UNIT(S): at 11:02

## 2023-09-05 RX ADMIN — GABAPENTIN 300 MILLIGRAM(S): 400 CAPSULE ORAL at 05:27

## 2023-09-05 RX ADMIN — ATORVASTATIN CALCIUM 10 MILLIGRAM(S): 80 TABLET, FILM COATED ORAL at 22:26

## 2023-09-05 RX ADMIN — ENOXAPARIN SODIUM 40 MILLIGRAM(S): 100 INJECTION SUBCUTANEOUS at 05:26

## 2023-09-05 RX ADMIN — GABAPENTIN 300 MILLIGRAM(S): 400 CAPSULE ORAL at 22:25

## 2023-09-05 RX ADMIN — Medication 975 MILLIGRAM(S): at 22:26

## 2023-09-05 RX ADMIN — Medication 100 MILLIGRAM(S): at 05:26

## 2023-09-05 RX ADMIN — Medication 112 MICROGRAM(S): at 05:25

## 2023-09-05 RX ADMIN — GABAPENTIN 300 MILLIGRAM(S): 400 CAPSULE ORAL at 13:05

## 2023-09-05 RX ADMIN — Medication 975 MILLIGRAM(S): at 13:05

## 2023-09-05 RX ADMIN — Medication 975 MILLIGRAM(S): at 13:40

## 2023-09-05 RX ADMIN — TRAMADOL HYDROCHLORIDE 50 MILLIGRAM(S): 50 TABLET ORAL at 09:02

## 2023-09-05 RX ADMIN — Medication 975 MILLIGRAM(S): at 05:27

## 2023-09-05 RX ADMIN — SENNA PLUS 2 TABLET(S): 8.6 TABLET ORAL at 22:26

## 2023-09-05 RX ADMIN — TRAMADOL HYDROCHLORIDE 50 MILLIGRAM(S): 50 TABLET ORAL at 08:23

## 2023-09-05 NOTE — PROGRESS NOTE ADULT - ASSESSMENT
89yo Female s/p left IT fx s/p repair , now with decreased functional mobility, gait instability and ADL impairments.    COMORBIDITES/ACTIVE MEDICAL ISSUES     Gait Instability, ADL impairments and Functional impairments: Comprehensive Rehab Program of PT/OT       #Mechanical fall/left hip fracture:  -s/p im nail, WBAT  -pain control w/Tylenol q8h, ice and prn Tramadol  -incentive spirometry  -bowel regimen.  -fall precautions  - PT OT evaluations  -US LLE f/up edema r/o DVT    #Acute blood loss anemia:  -blood loss, s/p 1u PRBC post op  -Hgb stable    #Leukocytosis  -asymptomatic  -encourage PO fluids  no apparent source-? stress related- follow CBC    #Vit D insufficiency:  - supplement    #Hypothyroidism  -cont. Levothyroxine 112mcg    #Hypertension  -continue bb with hold parameters  -arb/ace on hold for borderline bp  BPs OK on current dose of Toprol    #Hyperlipidemia  -continue statin.     #Peripheral Neuropathy  -cont. Gabapentin 300mg po tid    #Vte ppx  -lovenox    Pain Mgmt - Tylenol PRN  GI/Bowel Mgmt -  Senna,  Miralax   /Bladder Mgmt - Voiding independently, PVRx1      Precautions / PROPHYLAXIS:   - Falls, Cardiac  - Ortho: Weight bearing status: WBAT   - Lungs: Aspiration, Incentive Spirometer   - Pressure injury/Skin: Turn Q2hrs while in bed, OOB to Chair, PT/OT    - DVT: Lovenox

## 2023-09-05 NOTE — PROGRESS NOTE ADULT - NS ATTEND AMEND GEN_ALL_CORE FT
Rehab Attending- Patient seen and examined by me - Case discussed, above note reviewed by me with modifications made    patient seen and evaluated bedside  OOB to chair  Left Hip pain controlled with tylenol, tramadol  moving bowels, voiding clear urine- Primafit at night  discussed in team- tenative Dc 9/14  to continue intensive rehab program    Vital Signs Last 24 Hrs  T(C): 37.1 (01 Sep 2023 08:03), Max: 37.1 (01 Sep 2023 08:03)  T(F): 98.8 (01 Sep 2023 08:03), Max: 98.8 (01 Sep 2023 08:03)  HR: 60 (01 Sep 2023 08:03) (60 - 69)  BP: 144/65 (01 Sep 2023 08:03) (123/68 - 144/65)  BP(mean): --  RR: 16 (01 Sep 2023 08:03) (16 - 16)  SpO2: 95% (01 Sep 2023 08:03) (95% - 96%)    Parameters below as of 01 Sep 2023 08:03  Patient On (Oxygen Delivery Method): room air      PHYSICAL EXAM  Constitutional - NAD, Comfortable  HEENT - NCAT, EOMI  Neck - Supple, No limited ROM  Chest - Breathing comfortably, No wheezing, clear lungs  Cardiovascular - S1S2   Abdomen - Soft, rounded, hyperactive  Extremities - No C/C/E, No calf tenderness  Left hip incision bandaged  Skin on sacrum is blanchable  Neurologic Exam -                    Cognitive - AAO to self, place, date, year, situation     Communication - Fluent, No dysarthria     Cranial Nerves - CN 2-12 intact     Motor - No focal deficits                    LEFT    UE - ShAB 5/5, EF 5-/5, EE 5/5, WE 5/5,  5/5                    RIGHT UE - ShAB 5/5, EF 5/5, EE 5/5, WE 5/5,  5/5                    LEFT    LE - HF at least 2/5, KE 5/5, DF 5/5, PF 5/5                    RIGHT LE - HF 4/5, KE 5/5 DF 5/5, PF 5/5        Sensory - Intact to LT     Reflexes - DTR Intact     Coordination - FTN intact     Balance - impaired  Psychiatric - Mood stable, Affect WNL Rehab Attending- Patient seen and examined by me - Case discussed, above note reviewed by me with modifications made    patient seen and evaluated bedside  OOB to chair  Left Hip pain controlled with tylenol, tramadol  moving bowels, voiding clear urine- Primafit at night  labs reviewed- stable   left LE with increased swelling- feels heavy- will duplex  to continue intensive rehab program    Vital Signs Last 24 Hrs  T(C): 36.5 (05 Sep 2023 08:20), Max: 36.7 (05 Sep 2023 06:35)  T(F): 97.7 (05 Sep 2023 08:20), Max: 98 (05 Sep 2023 06:35)  HR: 60 (05 Sep 2023 08:20) (60 - 80)  BP: 101/60 (05 Sep 2023 08:20) (101/60 - 123/74)  BP(mean): --  RR: 15 (05 Sep 2023 08:20) (14 - 15)  SpO2: 99% (05 Sep 2023 08:20) (95% - 99%)    Parameters below as of 05 Sep 2023 08:20  Patient On (Oxygen Delivery Method): room air      PHYSICAL EXAM  Constitutional - NAD, Comfortable  HEENT - NCAT, EOMI  Neck - Supple, No limited ROM  Chest - Breathing comfortably, No wheezing, clear lungs  Cardiovascular - S1S2   Abdomen - Soft, rounded, hyperactive  Extremities - 2+ edema LLE No calf tenderness  Left hip incision bandaged  Skin on sacrum is blanchable  Neurologic Exam -                    Cognitive - AAO to self, place, date, year, situation     Communication - Fluent, No dysarthria     Cranial Nerves - CN 2-12 intact     Motor - No focal deficits                    LEFT    UE - ShAB 5/5, EF 5-/5, EE 5/5, WE 5/5,  5/5                    RIGHT UE - ShAB 5/5, EF 5/5, EE 5/5, WE 5/5,  5/5                    LEFT    LE - HF at least 2/5, KE 5/5, DF 5/5, PF 5/5                    RIGHT LE - HF 4/5, KE 5/5 DF 5/5, PF 5/5        Sensory - Intact to LT     Reflexes - DTR Intact     Coordination - FTN intact     Balance - impaired  Psychiatric - Mood stable, Affect WNL

## 2023-09-05 NOTE — PROGRESS NOTE ADULT - SUBJECTIVE AND OBJECTIVE BOX
HPI:  89yo Female w/PMHx of HTN, hypothyroid/Graves, neuropathy, breast ca, BCC, community ambulator (walker) to ED HH on 8/26 from home s/p fall with severe left hip pain, inability to ambulate. XR hip shows left IT fracture. Ortho consulted. Underwent IM nail 8/27. Post op anemia- 1 unit PRBC. WBAT. Lovenox sq for DVT ppx. Leukocytosis w/up neg, UA neg. ARB held 2/2 hypotension. Evaluated by physical therapy/PMR and acute rehab recommended. Cleared for dc on 8/30.    CT Pelvis Bony Only No Cont (08.26.23 @ 19:57) >Acute impacted left femoral intertrochanteric fracture as described.            ROS/subjective:  Patient seen bedside, NAD in chair, mild pain at hip-prn Tramadol-tolerating therapy.  moved bowels   voiding well  Tylenol ATC, ICE , PRN Tramadol for pain   no dizziness, no headaches  no SOB, no chest pain  no nausea, no vomiting  using primafit at night  US LLE today for edema r/o DVT         MEDICATIONS  (STANDING):  acetaminophen     Tablet .. 975 milliGRAM(s) Oral every 8 hours  atorvastatin 10 milliGRAM(s) Oral at bedtime  cholecalciferol 400 Unit(s) Oral daily  enoxaparin Injectable 40 milliGRAM(s) SubCutaneous every 24 hours  gabapentin 300 milliGRAM(s) Oral three times a day  levothyroxine 112 MICROGram(s) Oral daily  metoprolol succinate  milliGRAM(s) Oral daily  polyethylene glycol 3350 17 Gram(s) Oral daily  senna 2 Tablet(s) Oral at bedtime    MEDICATIONS  (PRN):  oxyCODONE    IR 5 milliGRAM(s) Oral every 6 hours PRN Severe Pain (7 - 10)  traMADol 50 milliGRAM(s) Oral every 6 hours PRN Moderate Pain (4 - 6)                            9.4    10.36 )-----------( 302      ( 05 Sep 2023 06:53 )             29.5     09-05    137  |  102  |  25<H>  ----------------------------<  97  4.7   |  28  |  0.89    Ca    9.3      05 Sep 2023 06:53    TPro  6.6  /  Alb  2.9<L>  /  TBili  0.8  /  DBili  x   /  AST  26  /  ALT  28  /  AlkPhos  81  09-05    Urinalysis Basic - ( 05 Sep 2023 06:53 )    Color: x / Appearance: x / SG: x / pH: x  Gluc: 97 mg/dL / Ketone: x  / Bili: x / Urobili: x   Blood: x / Protein: x / Nitrite: x   Leuk Esterase: x / RBC: x / WBC x   Sq Epi: x / Non Sq Epi: x / Bacteria: x      Vital Signs Last 24 Hrs  T(C): 36.5 (05 Sep 2023 08:20), Max: 36.7 (05 Sep 2023 06:35)  T(F): 97.7 (05 Sep 2023 08:20), Max: 98 (05 Sep 2023 06:35)  HR: 60 (05 Sep 2023 08:20) (60 - 80)  BP: 101/60 (05 Sep 2023 08:20) (101/60 - 123/74)  BP(mean): --  RR: 15 (05 Sep 2023 08:20) (14 - 15)  SpO2: 99% (05 Sep 2023 08:20) (95% - 99%)    Parameters below as of 05 Sep 2023 08:20  Patient On (Oxygen Delivery Method): room air    PHYSICAL EXAM  Constitutional - NAD, Comfortable  HEENT - NCAT, EOMI  Neck - Supple, No limited ROM  Chest - Breathing comfortably, No wheezing, clear lungs  Cardiovascular - S1S2   Abdomen - Soft, rounded, hyperactive  Extremities - No C/C/E, No calf tenderness  Left hip incision bandaged  Skin on sacrum is blanchable  Neurologic Exam -                    Cognitive - AAO to self, place, date, year, situation     Communication - Fluent, No dysarthria     Cranial Nerves - CN 2-12 intact     Motor - No focal deficits                    LEFT    UE - ShAB 5/5, EF 5-/5, EE 5/5, WE 5/5,  5/5                    RIGHT UE - ShAB 5/5, EF 5/5, EE 5/5, WE 5/5,  5/5                    LEFT    LE - HF at least 2/5, KE 5/5, DF 5/5, PF 5/5                    RIGHT LE - HF 4/5, KE 5/5 DF 5/5, PF 5/5        Sensory - Intact to LT     Reflexes - DTR Intact     Coordination - FTN intact     Balance - impaired  Psychiatric - Mood stable, Affect WNL    IDT meeting on 9/1    SW: Apt, ground level,     OT:  eating set up  grooming set up  UBD/LBD set up/min A  toileting min A  tub/shower min A  bathing min A    PT:  amb 40 min RW  transfers min A  stairs    SLP na    tentative dc home c HC 9/14        Continue comprehensive acute rehab program consisting of 3hrs/day of OT/PT and SLP. HPI:  89yo Female w/PMHx of HTN, hypothyroid/Graves, neuropathy, breast ca, BCC, community ambulator (walker) to ED HH on 8/26 from home s/p fall with severe left hip pain, inability to ambulate. XR hip shows left IT fracture. Ortho consulted. Underwent IM nail 8/27. Post op anemia- 1 unit PRBC. WBAT. Lovenox sq for DVT ppx. Leukocytosis w/up neg, UA neg. ARB held 2/2 hypotension. Evaluated by physical therapy/PMR and acute rehab recommended. Cleared for dc on 8/30.    CT Pelvis Bony Only No Cont (08.26.23 @ 19:57) >Acute impacted left femoral intertrochanteric fracture as described.            ROS/subjective:  Patient seen bedside, NAD in chair, mild pain at hip-prn Tramadol-tolerating therapy.  moved bowels   voiding well  Tylenol ATC, ICE , PRN Tramadol for pain   no dizziness, no headaches  no SOB, no chest pain  no nausea, no vomiting  using primafit at night  US LLE today for edema r/o DVT- reports minimal Left LE pain but leg feels heavy         MEDICATIONS  (STANDING):  acetaminophen     Tablet .. 975 milliGRAM(s) Oral every 8 hours  atorvastatin 10 milliGRAM(s) Oral at bedtime  cholecalciferol 400 Unit(s) Oral daily  enoxaparin Injectable 40 milliGRAM(s) SubCutaneous every 24 hours  gabapentin 300 milliGRAM(s) Oral three times a day  levothyroxine 112 MICROGram(s) Oral daily  metoprolol succinate  milliGRAM(s) Oral daily  polyethylene glycol 3350 17 Gram(s) Oral daily  senna 2 Tablet(s) Oral at bedtime    MEDICATIONS  (PRN):  oxyCODONE    IR 5 milliGRAM(s) Oral every 6 hours PRN Severe Pain (7 - 10)  traMADol 50 milliGRAM(s) Oral every 6 hours PRN Moderate Pain (4 - 6)                            9.4    10.36 )-----------( 302      ( 05 Sep 2023 06:53 )             29.5     09-05    137  |  102  |  25<H>  ----------------------------<  97  4.7   |  28  |  0.89    Ca    9.3      05 Sep 2023 06:53    TPro  6.6  /  Alb  2.9<L>  /  TBili  0.8  /  DBili  x   /  AST  26  /  ALT  28  /  AlkPhos  81  09-05    Urinalysis Basic - ( 05 Sep 2023 06:53 )    Color: x / Appearance: x / SG: x / pH: x  Gluc: 97 mg/dL / Ketone: x  / Bili: x / Urobili: x   Blood: x / Protein: x / Nitrite: x   Leuk Esterase: x / RBC: x / WBC x   Sq Epi: x / Non Sq Epi: x / Bacteria: x      Vital Signs Last 24 Hrs  T(C): 36.5 (05 Sep 2023 08:20), Max: 36.7 (05 Sep 2023 06:35)  T(F): 97.7 (05 Sep 2023 08:20), Max: 98 (05 Sep 2023 06:35)  HR: 60 (05 Sep 2023 08:20) (60 - 80)  BP: 101/60 (05 Sep 2023 08:20) (101/60 - 123/74)  BP(mean): --  RR: 15 (05 Sep 2023 08:20) (14 - 15)  SpO2: 99% (05 Sep 2023 08:20) (95% - 99%)    Parameters below as of 05 Sep 2023 08:20  Patient On (Oxygen Delivery Method): room air    PHYSICAL EXAM  Constitutional - NAD, Comfortable  HEENT - NCAT, EOMI  Neck - Supple, No limited ROM  Chest - Breathing comfortably, No wheezing, clear lungs  Cardiovascular - S1S2   Abdomen - Soft, rounded, hyperactive  Extremities - No C/C/E, No calf tenderness  Left hip incision bandaged  Skin on sacrum is blanchable  Neurologic Exam -                    Cognitive - AAO to self, place, date, year, situation     Communication - Fluent, No dysarthria     Cranial Nerves - CN 2-12 intact     Motor - No focal deficits                    LEFT    UE - ShAB 5/5, EF 5-/5, EE 5/5, WE 5/5,  5/5                    RIGHT UE - ShAB 5/5, EF 5/5, EE 5/5, WE 5/5,  5/5                    LEFT    LE - HF at least 2/5, KE 5/5, DF 5/5, PF 5/5                    RIGHT LE - HF 4/5, KE 5/5 DF 5/5, PF 5/5        Sensory - Intact to LT     Reflexes - DTR Intact     Coordination - FTN intact     Balance - impaired  Psychiatric - Mood stable, Affect WNL    IDT meeting on 9/1    SW: Apt, ground level,     OT:  eating set up  grooming set up  UBD/LBD set up/min A  toileting min A  tub/shower min A  bathing min A    PT:  amb 40 min RW  transfers min A  stairs    SLP na    tentative dc home c HC 9/14        Continue comprehensive acute rehab program consisting of 3hrs/day of OT/PT and SLP.

## 2023-09-06 PROCEDURE — 99232 SBSQ HOSP IP/OBS MODERATE 35: CPT | Mod: GC

## 2023-09-06 RX ADMIN — Medication 400 UNIT(S): at 11:16

## 2023-09-06 RX ADMIN — GABAPENTIN 300 MILLIGRAM(S): 400 CAPSULE ORAL at 21:40

## 2023-09-06 RX ADMIN — Medication 975 MILLIGRAM(S): at 22:40

## 2023-09-06 RX ADMIN — GABAPENTIN 300 MILLIGRAM(S): 400 CAPSULE ORAL at 13:09

## 2023-09-06 RX ADMIN — TRAMADOL HYDROCHLORIDE 50 MILLIGRAM(S): 50 TABLET ORAL at 11:43

## 2023-09-06 RX ADMIN — ATORVASTATIN CALCIUM 10 MILLIGRAM(S): 80 TABLET, FILM COATED ORAL at 21:40

## 2023-09-06 RX ADMIN — Medication 975 MILLIGRAM(S): at 21:40

## 2023-09-06 RX ADMIN — Medication 975 MILLIGRAM(S): at 13:40

## 2023-09-06 RX ADMIN — Medication 975 MILLIGRAM(S): at 13:06

## 2023-09-06 RX ADMIN — Medication 100 MILLIGRAM(S): at 05:42

## 2023-09-06 RX ADMIN — ENOXAPARIN SODIUM 40 MILLIGRAM(S): 100 INJECTION SUBCUTANEOUS at 05:44

## 2023-09-06 RX ADMIN — Medication 975 MILLIGRAM(S): at 05:42

## 2023-09-06 RX ADMIN — GABAPENTIN 300 MILLIGRAM(S): 400 CAPSULE ORAL at 05:42

## 2023-09-06 RX ADMIN — Medication 112 MICROGRAM(S): at 05:43

## 2023-09-06 RX ADMIN — TRAMADOL HYDROCHLORIDE 50 MILLIGRAM(S): 50 TABLET ORAL at 12:20

## 2023-09-06 NOTE — PROGRESS NOTE ADULT - ASSESSMENT
89yo Female s/p left IT fx s/p repair , now with decreased functional mobility, gait instability and ADL impairments.    COMORBIDITES/ACTIVE MEDICAL ISSUES     Gait Instability, ADL impairments and Functional impairments: Comprehensive Rehab Program of PT/OT       #Mechanical fall/left hip fracture:  -s/p im nail, WBAT  -pain control w/Tylenol q8h, ice and prn Tramadol  -incentive spirometry  -bowel regimen.  -fall precautions  - PT OT evaluations  -US LLE f/up edema-neg DVT    #Acute blood loss anemia:  -blood loss, s/p 1u PRBC post op  -Hgb stable    #Leukocytosis  -asymptomatic  -encourage PO fluids  no apparent source-? stress related- follow CBC    #Vit D insufficiency:  - supplement    #Hypothyroidism  -cont. Levothyroxine 112mcg    #Hypertension  -continue bb with hold parameters  -arb/ace on hold for borderline bp  BPs OK on current dose of Toprol    #Hyperlipidemia  -continue statin.     #Peripheral Neuropathy  -cont. Gabapentin 300mg po tid    #Vte ppx  -lovenox    Pain Mgmt - Tylenol PRN  GI/Bowel Mgmt -  Senna,  Miralax   /Bladder Mgmt - Voiding independently, PVRx1      Precautions / PROPHYLAXIS:   - Falls, Cardiac  - Ortho: Weight bearing status: WBAT   - Lungs: Aspiration, Incentive Spirometer   - Pressure injury/Skin: Turn Q2hrs while in bed, OOB to Chair, PT/OT    - DVT: Lovenox

## 2023-09-06 NOTE — CHART NOTE - NSCHARTNOTEFT_GEN_A_CORE
Nutrition Follow Up Note  Hospital Course   (Per Electronic Medical Record)    Source:  Patient [X]  Medical Record [X]      Diet:   Regular Diet (IDDSI Level 7) w/ Thin Liquids (IDDSI Level 0)  Tolerates Diet Consistency Well  No Chewing/Swallowing Difficulties  No Recent Nausea, Vomiting, Diarrhea or Constipation (as Per Patient)  Consumes % of Meals (as Per Documentation) - States Good PO Intake/Appetite (Per Patient)     Enteral/Parenteral Nutrition: Not Applicable    Current Weight: 178.5lb on 8/30  Obtain New Weight  Obtain Weights Weekly     Pertinent Medications: MEDICATIONS  (STANDING):  acetaminophen     Tablet .. 975 milliGRAM(s) Oral every 8 hours  atorvastatin 10 milliGRAM(s) Oral at bedtime  cholecalciferol 400 Unit(s) Oral daily  enoxaparin Injectable 40 milliGRAM(s) SubCutaneous every 24 hours  gabapentin 300 milliGRAM(s) Oral three times a day  levothyroxine 112 MICROGram(s) Oral daily  metoprolol succinate  milliGRAM(s) Oral daily  polyethylene glycol 3350 17 Gram(s) Oral daily  senna 2 Tablet(s) Oral at bedtime    MEDICATIONS  (PRN):  oxyCODONE    IR 5 milliGRAM(s) Oral every 6 hours PRN Severe Pain (7 - 10)  traMADol 50 milliGRAM(s) Oral every 6 hours PRN Moderate Pain (4 - 6)      Pertinent Labs:  09-05 Na137 mmol/L Glu 97 mg/dL K+ 4.7 mmol/L Cr  0.89 mg/dL BUN 25 mg/dL<H> 09-05 Alb 2.9 g/dL<L>    Skin: No Pressure Ulcers  Surgical Incision on Left Hip  (as Per Nursing Flow Sheet)     Edema: None Noted Recently (as Per Documentation)     Last Bowel Movement: on 9/6    Estimated Needs:   [X] No Change Since Previous Assessment    Previous Nutrition Diagnosis:   Obese    Nutrition Diagnosis is [X] Ongoing - Continue Nutrition Plan of Care     New Nutrition Diagnosis: [X] Not Applicable    Interventions:   1. Recommend Continue Nutrition Plan of Care     Monitoring & Evaluation:   [X] Weights   [X] PO Intake   [X] Skin Integrity   [X] Follow Up (Per Protocol)  [X] Tolerance to Diet Prescription   [X] Other: Labs     Registered Dietitian/Nutritionist Remains Available.  Barry Zuniga, MONIKAN, CDN    Phone# [phone]

## 2023-09-06 NOTE — PROGRESS NOTE ADULT - NS ATTEND AMEND GEN_ALL_CORE FT
Rehab Attending- Patient seen and examined by me - Case discussed, above note reviewed by me with modifications made    patient seen and evaluated bedside  OOB to chair  Left Hip pain controlled with tylenol, tramadol  moving bowels, voiding clear urine- Primafit at night  labs reviewed- stable   left LE with increased swelling- feels heavy- will duplex  to continue intensive rehab program    Vital Signs Last 24 Hrs  T(C): 36.5 (05 Sep 2023 08:20), Max: 36.7 (05 Sep 2023 06:35)  T(F): 97.7 (05 Sep 2023 08:20), Max: 98 (05 Sep 2023 06:35)  HR: 60 (05 Sep 2023 08:20) (60 - 80)  BP: 101/60 (05 Sep 2023 08:20) (101/60 - 123/74)  BP(mean): --  RR: 15 (05 Sep 2023 08:20) (14 - 15)  SpO2: 99% (05 Sep 2023 08:20) (95% - 99%)    Parameters below as of 05 Sep 2023 08:20  Patient On (Oxygen Delivery Method): room air      PHYSICAL EXAM  Constitutional - NAD, Comfortable  HEENT - NCAT, EOMI  Neck - Supple, No limited ROM  Chest - Breathing comfortably, No wheezing, clear lungs  Cardiovascular - S1S2   Abdomen - Soft, rounded, hyperactive  Extremities - 2+ edema LLE No calf tenderness  Left hip incision bandaged  Skin on sacrum is blanchable  Neurologic Exam -                    Cognitive - AAO to self, place, date, year, situation     Communication - Fluent, No dysarthria     Cranial Nerves - CN 2-12 intact     Motor - No focal deficits                    LEFT    UE - ShAB 5/5, EF 5-/5, EE 5/5, WE 5/5,  5/5                    RIGHT UE - ShAB 5/5, EF 5/5, EE 5/5, WE 5/5,  5/5                    LEFT    LE - HF at least 2/5, KE 5/5, DF 5/5, PF 5/5                    RIGHT LE - HF 4/5, KE 5/5 DF 5/5, PF 5/5        Sensory - Intact to LT     Reflexes - DTR Intact     Coordination - FTN intact     Balance - impaired  Psychiatric - Mood stable, Affect WNL Rehab Attending- Patient seen and examined by me - Case discussed, above note reviewed by me with modifications made    patient seen and evaluated bedside  OOB to chair  Left Hip pain controlled with tylenol, tramadol  moving bowels, voiding clear urine- Primafit at night- ? DC  left LE heavy- duplex negative- no edema noted today  Some ecchymoses at hip- Post op changes expected  to continue intensive rehab program      Vital Signs Last 24 Hrs  T(C): 36.8 (06 Sep 2023 08:15), Max: 36.9 (05 Sep 2023 20:58)  T(F): 98.2 (06 Sep 2023 08:15), Max: 98.5 (05 Sep 2023 20:58)  HR: 57 (06 Sep 2023 08:15) (57 - 67)  BP: 121/70 (06 Sep 2023 08:15) (110/61 - 121/70)  BP(mean): --  RR: 15 (06 Sep 2023 08:15) (14 - 15)  SpO2: 94% (06 Sep 2023 08:15) (93% - 96%)    Parameters below as of 06 Sep 2023 08:15  Patient On (Oxygen Delivery Method): room air    PHYSICAL EXAM  Constitutional - NAD, Comfortable  HEENT - NCAT, EOMI  Neck - Supple, No limited ROM  Chest - Breathing comfortably, No wheezing, clear lungs  Cardiovascular - S1S2   Abdomen - Soft, rounded, hyperactive  Extremities - trace edema LLE No calf tenderness  Left hip incision bandaged  Skin on sacrum is blanchable  Neurologic Exam -                    Cognitive - AAO to self, place, date, year, situation     Communication - Fluent, No dysarthria     Cranial Nerves - CN 2-12 intact     Motor - No focal deficits                    LEFT    UE - ShAB 5/5, EF 5-/5, EE 5/5, WE 5/5,  5/5                    RIGHT UE - ShAB 5/5, EF 5/5, EE 5/5, WE 5/5,  5/5                    LEFT    LE - HF at least 2/5, KE 5/5, DF 5/5, PF 5/5                    RIGHT LE - HF 4/5, KE 5/5 DF 5/5, PF 5/5        Sensory - Intact to LT     Reflexes - DTR Intact     Coordination - FTN intact     Balance - impaired  Psychiatric - Mood stable, Affect WNL

## 2023-09-06 NOTE — PROGRESS NOTE ADULT - SUBJECTIVE AND OBJECTIVE BOX
HPI:  89yo Female w/PMHx of HTN, hypothyroid/Graves, neuropathy, breast ca, BCC, community ambulator (walker) to ED HH on 8/26 from home s/p fall with severe left hip pain, inability to ambulate. XR hip shows left IT fracture. Ortho consulted. Underwent IM nail 8/27. Post op anemia- 1 unit PRBC. WBAT. Lovenox sq for DVT ppx. Leukocytosis w/up neg, UA neg. ARB held 2/2 hypotension. Evaluated by physical therapy/PMR and acute rehab recommended. Cleared for dc on 8/30.    CT Pelvis Bony Only No Cont (08.26.23 @ 19:57) >Acute impacted left femoral intertrochanteric fracture as described.         ROS/subjective:  Patient seen bedside, NAD in chair, mild pain at hip-prn Tramadol-tolerating therapy.  moved bowels   voiding well  Tylenol ATC, ICE , PRN Tramadol for pain   no dizziness, no headaches  no SOB, no chest pain  no nausea, no vomiting  using primafit at night  US LLE neg DVT- reports minimal Left LE pain but leg feels heavy      MEDICATIONS  (STANDING):  acetaminophen     Tablet .. 975 milliGRAM(s) Oral every 8 hours  atorvastatin 10 milliGRAM(s) Oral at bedtime  cholecalciferol 400 Unit(s) Oral daily  enoxaparin Injectable 40 milliGRAM(s) SubCutaneous every 24 hours  gabapentin 300 milliGRAM(s) Oral three times a day  levothyroxine 112 MICROGram(s) Oral daily  metoprolol succinate  milliGRAM(s) Oral daily  polyethylene glycol 3350 17 Gram(s) Oral daily  senna 2 Tablet(s) Oral at bedtime    MEDICATIONS  (PRN):  oxyCODONE    IR 5 milliGRAM(s) Oral every 6 hours PRN Severe Pain (7 - 10)  traMADol 50 milliGRAM(s) Oral every 6 hours PRN Moderate Pain (4 - 6)                            9.4    10.36 )-----------( 302      ( 05 Sep 2023 06:53 )             29.5     09-05    137  |  102  |  25<H>  ----------------------------<  97  4.7   |  28  |  0.89    Ca    9.3      05 Sep 2023 06:53    TPro  6.6  /  Alb  2.9<L>  /  TBili  0.8  /  DBili  x   /  AST  26  /  ALT  28  /  AlkPhos  81  09-05    Urinalysis Basic - ( 05 Sep 2023 06:53 )    Color: x / Appearance: x / SG: x / pH: x  Gluc: 97 mg/dL / Ketone: x  / Bili: x / Urobili: x   Blood: x / Protein: x / Nitrite: x   Leuk Esterase: x / RBC: x / WBC x   Sq Epi: x / Non Sq Epi: x / Bacteria: x      Vital Signs Last 24 Hrs  T(C): 36.8 (06 Sep 2023 08:15), Max: 36.9 (05 Sep 2023 20:58)  T(F): 98.2 (06 Sep 2023 08:15), Max: 98.5 (05 Sep 2023 20:58)  HR: 57 (06 Sep 2023 08:15) (57 - 67)  BP: 121/70 (06 Sep 2023 08:15) (110/61 - 121/70)  BP(mean): --  RR: 15 (06 Sep 2023 08:15) (14 - 15)  SpO2: 94% (06 Sep 2023 08:15) (93% - 96%)    Parameters below as of 06 Sep 2023 08:15  Patient On (Oxygen Delivery Method): room air        PHYSICAL EXAM  Constitutional - NAD, Comfortable  HEENT - NCAT, EOMI  Neck - Supple, No limited ROM  Chest - Breathing comfortably, No wheezing, clear lungs  Cardiovascular - S1S2   Abdomen - Soft, rounded, hyperactive  Extremities - No C/C/E, No calf tenderness  Left hip incision bandaged  Skin on sacrum is blanchable  Neurologic Exam -                    Cognitive - AAO to self, place, date, year, situation     Communication - Fluent, No dysarthria     Cranial Nerves - CN 2-12 intact     Motor - No focal deficits                    LEFT    UE - ShAB 5/5, EF 5-/5, EE 5/5, WE 5/5,  5/5                    RIGHT UE - ShAB 5/5, EF 5/5, EE 5/5, WE 5/5,  5/5                    LEFT    LE - HF at least 2/5, KE 5/5, DF 5/5, PF 5/5                    RIGHT LE - HF 4/5, KE 5/5 DF 5/5, PF 5/5        Sensory - Intact to LT     Reflexes - DTR Intact     Coordination - FTN intact     Balance - impaired  Psychiatric - Mood stable, Affect WNL    IDT meeting on 9/1    SW: Apt, ground level,     OT:  eating set up  grooming set up  UBD/LBD set up/min A  toileting min A  tub/shower min A  bathing min A    PT:  amb 40 min RW  transfers min A  stairs    SLP na    tentative dc home c HC 9/14        Continue comprehensive acute rehab program consisting of 3hrs/day of OT/PT.

## 2023-09-07 LAB
ALBUMIN SERPL ELPH-MCNC: 3.1 G/DL — LOW (ref 3.3–5)
ALP SERPL-CCNC: 83 U/L — SIGNIFICANT CHANGE UP (ref 40–120)
ALT FLD-CCNC: 23 U/L — SIGNIFICANT CHANGE UP (ref 10–45)
ANION GAP SERPL CALC-SCNC: 3 MMOL/L — LOW (ref 5–17)
AST SERPL-CCNC: 22 U/L — SIGNIFICANT CHANGE UP (ref 10–40)
BILIRUB SERPL-MCNC: 0.8 MG/DL — SIGNIFICANT CHANGE UP (ref 0.2–1.2)
BUN SERPL-MCNC: 23 MG/DL — SIGNIFICANT CHANGE UP (ref 7–23)
CALCIUM SERPL-MCNC: 9.2 MG/DL — SIGNIFICANT CHANGE UP (ref 8.4–10.5)
CHLORIDE SERPL-SCNC: 102 MMOL/L — SIGNIFICANT CHANGE UP (ref 96–108)
CO2 SERPL-SCNC: 33 MMOL/L — HIGH (ref 22–31)
CREAT SERPL-MCNC: 0.86 MG/DL — SIGNIFICANT CHANGE UP (ref 0.5–1.3)
EGFR: 64 ML/MIN/1.73M2 — SIGNIFICANT CHANGE UP
GLUCOSE SERPL-MCNC: 96 MG/DL — SIGNIFICANT CHANGE UP (ref 70–99)
HCT VFR BLD CALC: 29.3 % — LOW (ref 34.5–45)
HGB BLD-MCNC: 9.4 G/DL — LOW (ref 11.5–15.5)
MCHC RBC-ENTMCNC: 31.3 PG — SIGNIFICANT CHANGE UP (ref 27–34)
MCHC RBC-ENTMCNC: 32.1 GM/DL — SIGNIFICANT CHANGE UP (ref 32–36)
MCV RBC AUTO: 97.7 FL — SIGNIFICANT CHANGE UP (ref 80–100)
NRBC # BLD: 0 /100 WBCS — SIGNIFICANT CHANGE UP (ref 0–0)
PLATELET # BLD AUTO: 286 K/UL — SIGNIFICANT CHANGE UP (ref 150–400)
POTASSIUM SERPL-MCNC: 4.5 MMOL/L — SIGNIFICANT CHANGE UP (ref 3.5–5.3)
POTASSIUM SERPL-SCNC: 4.5 MMOL/L — SIGNIFICANT CHANGE UP (ref 3.5–5.3)
PROT SERPL-MCNC: 6.7 G/DL — SIGNIFICANT CHANGE UP (ref 6–8.3)
RBC # BLD: 3 M/UL — LOW (ref 3.8–5.2)
RBC # FLD: 16.5 % — HIGH (ref 10.3–14.5)
SODIUM SERPL-SCNC: 138 MMOL/L — SIGNIFICANT CHANGE UP (ref 135–145)
WBC # BLD: 10.03 K/UL — SIGNIFICANT CHANGE UP (ref 3.8–10.5)
WBC # FLD AUTO: 10.03 K/UL — SIGNIFICANT CHANGE UP (ref 3.8–10.5)

## 2023-09-07 PROCEDURE — 99232 SBSQ HOSP IP/OBS MODERATE 35: CPT | Mod: GC

## 2023-09-07 RX ORDER — TRAMADOL HYDROCHLORIDE 50 MG/1
50 TABLET ORAL THREE TIMES A DAY
Refills: 0 | Status: DISCONTINUED | OUTPATIENT
Start: 2023-09-07 | End: 2023-09-08

## 2023-09-07 RX ADMIN — Medication 975 MILLIGRAM(S): at 21:36

## 2023-09-07 RX ADMIN — Medication 975 MILLIGRAM(S): at 22:36

## 2023-09-07 RX ADMIN — Medication 975 MILLIGRAM(S): at 05:26

## 2023-09-07 RX ADMIN — Medication 975 MILLIGRAM(S): at 14:40

## 2023-09-07 RX ADMIN — Medication 112 MICROGRAM(S): at 05:26

## 2023-09-07 RX ADMIN — GABAPENTIN 300 MILLIGRAM(S): 400 CAPSULE ORAL at 21:36

## 2023-09-07 RX ADMIN — ATORVASTATIN CALCIUM 10 MILLIGRAM(S): 80 TABLET, FILM COATED ORAL at 21:36

## 2023-09-07 RX ADMIN — ENOXAPARIN SODIUM 40 MILLIGRAM(S): 100 INJECTION SUBCUTANEOUS at 05:26

## 2023-09-07 RX ADMIN — GABAPENTIN 300 MILLIGRAM(S): 400 CAPSULE ORAL at 13:58

## 2023-09-07 RX ADMIN — Medication 400 UNIT(S): at 11:52

## 2023-09-07 RX ADMIN — TRAMADOL HYDROCHLORIDE 50 MILLIGRAM(S): 50 TABLET ORAL at 10:15

## 2023-09-07 RX ADMIN — GABAPENTIN 300 MILLIGRAM(S): 400 CAPSULE ORAL at 05:26

## 2023-09-07 RX ADMIN — POLYETHYLENE GLYCOL 3350 17 GRAM(S): 17 POWDER, FOR SOLUTION ORAL at 11:50

## 2023-09-07 RX ADMIN — Medication 100 MILLIGRAM(S): at 05:26

## 2023-09-07 RX ADMIN — Medication 975 MILLIGRAM(S): at 13:58

## 2023-09-07 RX ADMIN — TRAMADOL HYDROCHLORIDE 50 MILLIGRAM(S): 50 TABLET ORAL at 09:23

## 2023-09-07 RX ADMIN — Medication 975 MILLIGRAM(S): at 06:26

## 2023-09-07 NOTE — PROGRESS NOTE ADULT - NS ATTEND AMEND GEN_ALL_CORE FT
Rehab Attending- Patient seen and examined by me - Case discussed, above note reviewed by me with modifications made    patient seen and evaluated bedside  OOB to chair  Left Hip pain controlled with tylenol, tramadol  moving bowels, voiding clear urine- Primafit at night- ? DC  left LE heavy- duplex negative- no edema noted today  Some ecchymoses at hip- Post op changes expected  to continue intensive rehab program      Vital Signs Last 24 Hrs  T(C): 36.8 (06 Sep 2023 08:15), Max: 36.9 (05 Sep 2023 20:58)  T(F): 98.2 (06 Sep 2023 08:15), Max: 98.5 (05 Sep 2023 20:58)  HR: 57 (06 Sep 2023 08:15) (57 - 67)  BP: 121/70 (06 Sep 2023 08:15) (110/61 - 121/70)  BP(mean): --  RR: 15 (06 Sep 2023 08:15) (14 - 15)  SpO2: 94% (06 Sep 2023 08:15) (93% - 96%)    Parameters below as of 06 Sep 2023 08:15  Patient On (Oxygen Delivery Method): room air    PHYSICAL EXAM  Constitutional - NAD, Comfortable  HEENT - NCAT, EOMI  Neck - Supple, No limited ROM  Chest - Breathing comfortably, No wheezing, clear lungs  Cardiovascular - S1S2   Abdomen - Soft, rounded, hyperactive  Extremities - trace edema LLE No calf tenderness  Left hip incision bandaged  Skin on sacrum is blanchable  Neurologic Exam -                    Cognitive - AAO to self, place, date, year, situation     Communication - Fluent, No dysarthria     Cranial Nerves - CN 2-12 intact     Motor - No focal deficits                    LEFT    UE - ShAB 5/5, EF 5-/5, EE 5/5, WE 5/5,  5/5                    RIGHT UE - ShAB 5/5, EF 5/5, EE 5/5, WE 5/5,  5/5                    LEFT    LE - HF at least 2/5, KE 5/5, DF 5/5, PF 5/5                    RIGHT LE - HF 4/5, KE 5/5 DF 5/5, PF 5/5        Sensory - Intact to LT     Reflexes - DTR Intact     Coordination - FTN intact     Balance - impaired  Psychiatric - Mood stable, Affect WNL Rehab Attending- Patient seen and examined by me - Case discussed, above note reviewed by me with modifications made    patient seen and evaluated bedside  OOB to chair  reports didn't do as well in PT this morning- Left knee buckled- no change on exam- will follow  Left Hip pain controlled with tylenol, tramadol  moving bowels, voiding clear urine-   labs reviewed by me- stable  to continue intensive rehab program      Vital Signs Last 24 Hrs  T(C): 36.6 (07 Sep 2023 08:16), Max: 36.8 (07 Sep 2023 08:10)  T(F): 97.8 (07 Sep 2023 08:16), Max: 98.2 (07 Sep 2023 08:10)  HR: 76 (07 Sep 2023 08:16) (58 - 76)  BP: 104/72 (07 Sep 2023 08:16) (104/72 - 151/72)  BP(mean): --  RR: 16 (07 Sep 2023 08:16) (16 - 16)  SpO2: 99% (07 Sep 2023 08:16) (94% - 99%)    Parameters below as of 07 Sep 2023 08:16  Patient On (Oxygen Delivery Method): room air    PHYSICAL EXAM  Constitutional - NAD, Comfortable  HEENT - NCAT, EOMI  Neck - Supple, No limited ROM  Chest - Breathing comfortably, No wheezing, clear lungs  Cardiovascular - S1S2   Abdomen - Soft, rounded, hyperactive  Extremities - trace edema LLE No calf tenderness  Left hip incision bandaged  Skin on sacrum is blanchable  Neurologic Exam -                    Cognitive - AAO to self, place, date, year, situation     Communication - Fluent, No dysarthria     Cranial Nerves - CN 2-12 intact     Motor - No focal deficits                    LEFT    UE - ShAB 5/5, EF 5-/5, EE 5/5, WE 5/5,  5/5                    RIGHT UE - ShAB 5/5, EF 5/5, EE 5/5, WE 5/5,  5/5                    LEFT    LE - HF at least 2/5, KE 5/5, DF 5/5, PF 5/5                    RIGHT LE - HF 3/5, KE 5/5 DF 5/5, PF 5/5        Sensory - Intact to LT     Reflexes - DTR Intact     Coordination - FTN intact     Balance - impaired  Psychiatric - Mood stable, Affect WNL

## 2023-09-07 NOTE — PROGRESS NOTE ADULT - ASSESSMENT
89yo Female s/p left IT fx s/p repair , now with decreased functional mobility, gait instability and ADL impairments.    COMORBIDITES/ACTIVE MEDICAL ISSUES     Gait Instability, ADL impairments and Functional impairments: Comprehensive Rehab Program of PT/OT     #Mechanical fall/left hip fracture:  -s/p im nail, WBAT  -pain control w/Tylenol q8h, ice and prn Tramadol  -incentive spirometry  -bowel regimen.  - fall precautions  - PT OT evaluations  - US LLE f/up edema-neg DVT    #Acute blood loss anemia:  -blood loss, s/p 1u PRBC post op  -Hgb stable    #Leukocytosis  -asymptomatic  -encourage PO fluids  no apparent source-? stress related- follow CBC    #Vit D insufficiency:  - supplement    #Hypothyroidism  -cont. Levothyroxine 112mcg    #Hypertension  -continue bb with hold parameters  -arb/ace on hold for borderline bp  BPs OK on current dose of Toprol    #Hyperlipidemia  -continue statin.     #Peripheral Neuropathy  -cont. Gabapentin 300mg po tid    #Vte ppx  -lovenox    Pain Mgmt - Tylenol PRN  GI/Bowel Mgmt -  Senna,  Miralax   /Bladder Mgmt - Voiding independently, PVRx1      Precautions / PROPHYLAXIS:   - Falls, Cardiac  - Ortho: Weight bearing status: WBAT   - Lungs: Aspiration, Incentive Spirometer   - Pressure injury/Skin: Turn Q2hrs while in bed, OOB to Chair, PT/OT    - DVT: Lovenox

## 2023-09-07 NOTE — PROGRESS NOTE ADULT - SUBJECTIVE AND OBJECTIVE BOX
HPI:  89yo Female w/PMHx of HTN, hypothyroid/Graves, neuropathy, breast ca, BCC, community ambulator (walker) to ED HH on 8/26 from home s/p fall with severe left hip pain, inability to ambulate. XR hip shows left IT fracture. Ortho consulted. Underwent IM nail 8/27. Post op anemia- 1 unit PRBC. WBAT. Lovenox sq for DVT ppx. Leukocytosis w/up neg, UA neg. ARB held 2/2 hypotension. Evaluated by physical therapy/PMR and acute rehab recommended. Cleared for dc on 8/30.    CT Pelvis Bony Only No Cont (08.26.23 @ 19:57) >Acute impacted left femoral intertrochanteric fracture as described.       ROS/subjective:  Patient seen bedside, NAD in chair, mild pain at hip-prn Tramadol-tolerating therapy.  moved bowels   voiding well  Tylenol ATC, ICE , PRN Tramadol for pain   no dizziness, no headaches  no SOB, no chest pain  no nausea, no vomiting  using primafit at night  US LLE neg DVT- reports minimal Left LE pain but leg feels heavy      MEDICATIONS  (STANDING):  acetaminophen     Tablet .. 975 milliGRAM(s) Oral every 8 hours  atorvastatin 10 milliGRAM(s) Oral at bedtime  cholecalciferol 400 Unit(s) Oral daily  enoxaparin Injectable 40 milliGRAM(s) SubCutaneous every 24 hours  gabapentin 300 milliGRAM(s) Oral three times a day  levothyroxine 112 MICROGram(s) Oral daily  metoprolol succinate  milliGRAM(s) Oral daily  polyethylene glycol 3350 17 Gram(s) Oral daily  senna 2 Tablet(s) Oral at bedtime    MEDICATIONS  (PRN):  traMADol 50 milliGRAM(s) Oral three times a day PRN Severe Pain (7 - 10)                            9.4    10.03 )-----------( 286      ( 07 Sep 2023 06:00 )             29.3     09-07    138  |  102  |  23  ----------------------------<  96  4.5   |  33<H>  |  0.86    Ca    9.2      07 Sep 2023 06:00    TPro  6.7  /  Alb  3.1<L>  /  TBili  0.8  /  DBili  x   /  AST  22  /  ALT  23  /  AlkPhos  83  09-07    Urinalysis Basic - ( 07 Sep 2023 06:00 )    Color: x / Appearance: x / SG: x / pH: x  Gluc: 96 mg/dL / Ketone: x  / Bili: x / Urobili: x   Blood: x / Protein: x / Nitrite: x   Leuk Esterase: x / RBC: x / WBC x   Sq Epi: x / Non Sq Epi: x / Bacteria: x        CAPILLARY BLOOD GLUCOSE          Vital Signs Last 24 Hrs  T(C): 36.6 (07 Sep 2023 08:16), Max: 36.8 (07 Sep 2023 08:10)  T(F): 97.8 (07 Sep 2023 08:16), Max: 98.2 (07 Sep 2023 08:10)  HR: 76 (07 Sep 2023 08:16) (58 - 76)  BP: 104/72 (07 Sep 2023 08:16) (104/72 - 151/72)  BP(mean): --  RR: 16 (07 Sep 2023 08:16) (16 - 16)  SpO2: 99% (07 Sep 2023 08:16) (94% - 99%)    Parameters below as of 07 Sep 2023 08:16  Patient On (Oxygen Delivery Method): room air      PHYSICAL EXAM  Constitutional - NAD, Comfortable  HEENT - NCAT, EOMI  Neck - Supple, No limited ROM  Chest - Breathing comfortably, No wheezing, clear lungs  Cardiovascular - S1S2   Abdomen - Soft, rounded, hyperactive  Extremities - No C/C/E, No calf tenderness  Left hip incision bandaged  Skin on sacrum is blanchable  Neurologic Exam -                    Cognitive - AAO to self, place, date, year, situation     Communication - Fluent, No dysarthria     Cranial Nerves - CN 2-12 intact     Motor - No focal deficits                    LEFT    UE - ShAB 5/5, EF 5-/5, EE 5/5, WE 5/5,  5/5                    RIGHT UE - ShAB 5/5, EF 5/5, EE 5/5, WE 5/5,  5/5                    LEFT    LE - HF at least 2/5, KE 5/5, DF 5/5, PF 5/5                    RIGHT LE - HF 4/5, KE 5/5 DF 5/5, PF 5/5        Sensory - Intact to LT     Reflexes - DTR Intact     Coordination - FTN intact     Balance - impaired  Psychiatric - Mood stable, Affect WNL    IDT meeting on 9/1    SW: Apt, ground level,     OT:  eating set up  grooming set up  UBD/LBD set up/min A  toileting min A  tub/shower min A  bathing min A    PT:  amb 40 min RW  transfers min A  stairs    SLP na    tentative dc home c HC 9/14      Continue comprehensive acute rehab program consisting of 3hrs/day of OT/PT and SLP.

## 2023-09-08 PROCEDURE — 99232 SBSQ HOSP IP/OBS MODERATE 35: CPT

## 2023-09-08 PROCEDURE — 99233 SBSQ HOSP IP/OBS HIGH 50: CPT | Mod: GC

## 2023-09-08 RX ORDER — METOPROLOL TARTRATE 50 MG
75 TABLET ORAL DAILY
Refills: 0 | Status: DISCONTINUED | OUTPATIENT
Start: 2023-09-09 | End: 2023-09-12

## 2023-09-08 RX ADMIN — ENOXAPARIN SODIUM 40 MILLIGRAM(S): 100 INJECTION SUBCUTANEOUS at 05:34

## 2023-09-08 RX ADMIN — TRAMADOL HYDROCHLORIDE 50 MILLIGRAM(S): 50 TABLET ORAL at 13:36

## 2023-09-08 RX ADMIN — Medication 975 MILLIGRAM(S): at 05:34

## 2023-09-08 RX ADMIN — Medication 112 MICROGRAM(S): at 05:34

## 2023-09-08 RX ADMIN — TRAMADOL HYDROCHLORIDE 50 MILLIGRAM(S): 50 TABLET ORAL at 12:44

## 2023-09-08 RX ADMIN — GABAPENTIN 300 MILLIGRAM(S): 400 CAPSULE ORAL at 13:05

## 2023-09-08 RX ADMIN — Medication 975 MILLIGRAM(S): at 06:34

## 2023-09-08 RX ADMIN — Medication 100 MILLIGRAM(S): at 05:34

## 2023-09-08 RX ADMIN — Medication 400 UNIT(S): at 12:45

## 2023-09-08 RX ADMIN — Medication 975 MILLIGRAM(S): at 13:04

## 2023-09-08 RX ADMIN — ATORVASTATIN CALCIUM 10 MILLIGRAM(S): 80 TABLET, FILM COATED ORAL at 22:48

## 2023-09-08 RX ADMIN — Medication 975 MILLIGRAM(S): at 14:37

## 2023-09-08 RX ADMIN — GABAPENTIN 300 MILLIGRAM(S): 400 CAPSULE ORAL at 22:48

## 2023-09-08 RX ADMIN — Medication 975 MILLIGRAM(S): at 22:49

## 2023-09-08 RX ADMIN — GABAPENTIN 300 MILLIGRAM(S): 400 CAPSULE ORAL at 05:34

## 2023-09-08 RX ADMIN — Medication 975 MILLIGRAM(S): at 23:19

## 2023-09-08 NOTE — PROGRESS NOTE ADULT - NS ATTEND AMEND GEN_ALL_CORE FT
Rehab Attending- Patient seen and examined by me - Case discussed, above note reviewed by me with modifications made    patient seen and evaluated bedside  OOB to chair  reports didn't do as well in PT this morning- Left knee buckled- no change on exam- will follow  Left Hip pain controlled with tylenol, tramadol  moving bowels, voiding clear urine-   labs reviewed by me- stable  to continue intensive rehab program      Vital Signs Last 24 Hrs  T(C): 36.6 (07 Sep 2023 08:16), Max: 36.8 (07 Sep 2023 08:10)  T(F): 97.8 (07 Sep 2023 08:16), Max: 98.2 (07 Sep 2023 08:10)  HR: 76 (07 Sep 2023 08:16) (58 - 76)  BP: 104/72 (07 Sep 2023 08:16) (104/72 - 151/72)  BP(mean): --  RR: 16 (07 Sep 2023 08:16) (16 - 16)  SpO2: 99% (07 Sep 2023 08:16) (94% - 99%)    Parameters below as of 07 Sep 2023 08:16  Patient On (Oxygen Delivery Method): room air    PHYSICAL EXAM  Constitutional - NAD, Comfortable  HEENT - NCAT, EOMI  Neck - Supple, No limited ROM  Chest - Breathing comfortably, No wheezing, clear lungs  Cardiovascular - S1S2   Abdomen - Soft, rounded, hyperactive  Extremities - trace edema LLE No calf tenderness  Left hip incision bandaged  Skin on sacrum is blanchable  Neurologic Exam -                    Cognitive - AAO to self, place, date, year, situation     Communication - Fluent, No dysarthria     Cranial Nerves - CN 2-12 intact     Motor - No focal deficits                    LEFT    UE - ShAB 5/5, EF 5-/5, EE 5/5, WE 5/5,  5/5                    RIGHT UE - ShAB 5/5, EF 5/5, EE 5/5, WE 5/5,  5/5                    LEFT    LE - HF at least 2/5, KE 5/5, DF 5/5, PF 5/5                    RIGHT LE - HF 3/5, KE 5/5 DF 5/5, PF 5/5        Sensory - Intact to LT     Reflexes - DTR Intact     Coordination - FTN intact     Balance - impaired  Psychiatric - Mood stable, Affect WNL Rehab Attending- Patient seen and examined by me - Case discussed, above note reviewed by me with modifications made    patient seen and evaluated in OT  OOB to chair  reports no further buckling- left leg still feels heavy  Left Hip pain controlled with tylenol, tramadol  moving bowels, voiding clear urine-   discussed in team- tentative Dc 9/14- needs family training- HHA on Dc  soc service to provide patient and family with resources  would benefit from hospital bed on DC  to continue intensive rehab program      Vital Signs Last 24 Hrs  T(C): 36.4 (08 Sep 2023 07:47), Max: 36.6 (07 Sep 2023 20:47)  T(F): 97.5 (08 Sep 2023 07:47), Max: 97.9 (07 Sep 2023 20:47)  HR: 55 (08 Sep 2023 07:47) (55 - 63)  BP: 119/78 (08 Sep 2023 07:47) (115/64 - 122/60)  BP(mean): --  RR: 16 (08 Sep 2023 07:47) (16 - 16)  SpO2: 94% (08 Sep 2023 07:47) (94% - 97%)    Parameters below as of 08 Sep 2023 07:47  Patient On (Oxygen Delivery Method): room air      PHYSICAL EXAM  Constitutional - NAD, Comfortable  HEENT - NCAT, EOMI  Neck - Supple, No limited ROM  Chest - Breathing comfortably, No wheezing, clear lungs  Cardiovascular - S1S2   Abdomen - Soft, rounded, hyperactive  Extremities - trace edema LLE No calf tenderness  Left hip incision bandaged  Skin on sacrum is blanchable  Neurologic Exam -                    Cognitive - AAO to self, place, date, year, situation     Communication - Fluent, No dysarthria     Cranial Nerves - CN 2-12 intact     Motor - No focal deficits                    LEFT    UE - ShAB 5/5, EF 5-/5, EE 5/5, WE 5/5,  5/5                    RIGHT UE - ShAB 5/5, EF 5/5, EE 5/5, WE 5/5,  5/5                    LEFT    LE - HF at least 2/5, KE 5/5, DF 5/5, PF 5/5                    RIGHT LE - HF 3/5, KE 5/5 DF 5/5, PF 5/5        Sensory - Intact to LT     Reflexes - DTR Intact     Coordination - FTN intact     Balance - impaired  Psychiatric - Mood stable, Affect WNL Rehab Attending- Patient seen and examined by me - Case discussed, above note reviewed by me with modifications made    patient seen and evaluated in OT  OOB to chair  reports no further buckling- left leg still feels heavy  Left Hip pain controlled with tylenol, tramadol  moving bowels, voiding clear urine-   discussed in team- tentative Dc 9/14- needs family training- HHA on Dc  soc service to provide patient and family with resources  would benefit from hospital bed on DC  to continue intensive rehab program    20 additional minutes spent today on coordination of care including team conference as well as conversation with patient's daughter Virginia - OVI updated her on plan of care- All questions were answered by me as well.      Vital Signs Last 24 Hrs  T(C): 36.4 (08 Sep 2023 07:47), Max: 36.6 (07 Sep 2023 20:47)  T(F): 97.5 (08 Sep 2023 07:47), Max: 97.9 (07 Sep 2023 20:47)  HR: 55 (08 Sep 2023 07:47) (55 - 63)  BP: 119/78 (08 Sep 2023 07:47) (115/64 - 122/60)  BP(mean): --  RR: 16 (08 Sep 2023 07:47) (16 - 16)  SpO2: 94% (08 Sep 2023 07:47) (94% - 97%)    Parameters below as of 08 Sep 2023 07:47  Patient On (Oxygen Delivery Method): room air      PHYSICAL EXAM  Constitutional - NAD, Comfortable  HEENT - NCAT, EOMI  Neck - Supple, No limited ROM  Chest - Breathing comfortably, No wheezing, clear lungs  Cardiovascular - S1S2   Abdomen - Soft, rounded, hyperactive  Extremities - trace edema LLE No calf tenderness  Left hip incision bandaged  Skin on sacrum is blanchable  Neurologic Exam -                    Cognitive - AAO to self, place, date, year, situation     Communication - Fluent, No dysarthria     Cranial Nerves - CN 2-12 intact     Motor - No focal deficits                    LEFT    UE - ShAB 5/5, EF 5-/5, EE 5/5, WE 5/5,  5/5                    RIGHT UE - ShAB 5/5, EF 5/5, EE 5/5, WE 5/5,  5/5                    LEFT    LE - HF at least 2/5, KE 5/5, DF 5/5, PF 5/5                    RIGHT LE - HF 3/5, KE 5/5 DF 5/5, PF 5/5        Sensory - Intact to LT     Reflexes - DTR Intact     Coordination - FTN intact     Balance - impaired  Psychiatric - Mood stable, Affect WNL

## 2023-09-08 NOTE — PROGRESS NOTE ADULT - SUBJECTIVE AND OBJECTIVE BOX
chase is a 89y old  female who presents with a chief complaint of s/p left IT fx s/p repair      SUBJECTIVE / OVERNIGHT EVENTS: Patient seen and examined. No acute events overnight. Pain well controlled and patient without any complaints.    MEDICATIONS  (STANDING):  acetaminophen     Tablet .. 975 milliGRAM(s) Oral every 8 hours  atorvastatin 10 milliGRAM(s) Oral at bedtime  cholecalciferol 400 Unit(s) Oral daily  enoxaparin Injectable 40 milliGRAM(s) SubCutaneous every 24 hours  gabapentin 300 milliGRAM(s) Oral three times a day  levothyroxine 112 MICROGram(s) Oral daily  metoprolol succinate  milliGRAM(s) Oral daily  polyethylene glycol 3350 17 Gram(s) Oral daily  senna 2 Tablet(s) Oral at bedtime    MEDICATIONS  (PRN):  oxyCODONE    IR 5 milliGRAM(s) Oral every 6 hours PRN Severe Pain (7 - 10)  traMADol 50 milliGRAM(s) Oral every 6 hours PRN Moderate Pain (4 - 6)    Vital Signs Last 24 Hrs  T(C): 36.4 (08 Sep 2023 07:47), Max: 36.6 (07 Sep 2023 20:47)  T(F): 97.5 (08 Sep 2023 07:47), Max: 97.9 (07 Sep 2023 20:47)  HR: 55 (08 Sep 2023 07:47) (55 - 63)  BP: 119/78 (08 Sep 2023 07:47) (115/64 - 122/60)  RR: 16 (08 Sep 2023 07:47) (16 - 16)  SpO2: 94% (08 Sep 2023 07:47) (94% - 97%)    Parameters below as of 08 Sep 2023 07:47  Patient On (Oxygen Delivery Method): room air    REVIEW OF SYSTEMS:  For ROV please refer back to H&P     PHYSICAL EXAM:  GENERAL: NAD, well-groomed, well-developed  HEAD:  Atraumatic, Normocephalic  EYES: EOMI, PERRLA, conjunctiva and sclera clear  ENMT: No tonsillar erythema, exudates, or enlargement; Moist mucous membranes, Good dentition, No lesions  NECK: Supple, No JVD, Normal thyroid  NERVOUS SYSTEM:  Alert & Oriented X3  CHEST/LUNG: Clear to percussion bilaterally; No rales, rhonchi, wheezing, or rubs  HEART: Regular rate and rhythm; No murmurs, rubs, or gallops  ABDOMEN: Soft, Nontender, Nondistended; Bowel sounds present  EXTREMITIES:  2+ Peripheral Pulses, No clubbing, cyanosis, or edema      LABS    09-07    138  |  102  |  23  ----------------------------<  96  4.5   |  33<H>  |  0.86    Ca    9.2      07 Sep 2023 06:00    TPro  6.7  /  Alb  3.1<L>  /  TBili  0.8  /  DBili  x   /  AST  22  /  ALT  23  /  AlkPhos  83  09-07                        9.4    10.03 )-----------( 286      ( 07 Sep 2023 06:00 )             29.3

## 2023-09-08 NOTE — PROGRESS NOTE ADULT - SUBJECTIVE AND OBJECTIVE BOX
HPI:  89yo Female w/PMHx of HTN, hypothyroid/Graves, neuropathy, breast ca, BCC, community ambulator (walker) to ED HH on 8/26 from home s/p fall with severe left hip pain, inability to ambulate. XR hip shows left IT fracture. Ortho consulted. Underwent IM nail 8/27. Post op anemia- 1 unit PRBC. WBAT. Lovenox sq for DVT ppx. Leukocytosis w/up neg, UA neg. ARB held 2/2 hypotension. Evaluated by physical therapy/PMR and acute rehab recommended. Cleared for dc on 8/30.    CT Pelvis Bony Only No Cont (08.26.23 @ 19:57) >Acute impacted left femoral intertrochanteric fracture as described.       ROS/subjective:  Patient seen bedside, NAD in chair, mild pain at hip-prn Tramadol-tolerating therapy.  moved bowels   voiding well  Tylenol ATC, ICE , PRN Tramadol for pain   no dizziness, no headaches  no SOB, no chest pain  no nausea, no vomiting  using primafit at night  US LLE neg DVT- reports minimal Left LE pain but leg feels heavy      MEDICATIONS  (STANDING):  acetaminophen     Tablet .. 975 milliGRAM(s) Oral every 8 hours  atorvastatin 10 milliGRAM(s) Oral at bedtime  cholecalciferol 400 Unit(s) Oral daily  enoxaparin Injectable 40 milliGRAM(s) SubCutaneous every 24 hours  gabapentin 300 milliGRAM(s) Oral three times a day  levothyroxine 112 MICROGram(s) Oral daily  polyethylene glycol 3350 17 Gram(s) Oral daily  senna 2 Tablet(s) Oral at bedtime    MEDICATIONS  (PRN):  traMADol 50 milliGRAM(s) Oral three times a day PRN Severe Pain (7 - 10)                            9.4    10.03 )-----------( 286      ( 07 Sep 2023 06:00 )             29.3     09-07    138  |  102  |  23  ----------------------------<  96  4.5   |  33<H>  |  0.86    Ca    9.2      07 Sep 2023 06:00    TPro  6.7  /  Alb  3.1<L>  /  TBili  0.8  /  DBili  x   /  AST  22  /  ALT  23  /  AlkPhos  83  09-07    Urinalysis Basic - ( 07 Sep 2023 06:00 )    Color: x / Appearance: x / SG: x / pH: x  Gluc: 96 mg/dL / Ketone: x  / Bili: x / Urobili: x   Blood: x / Protein: x / Nitrite: x   Leuk Esterase: x / RBC: x / WBC x   Sq Epi: x / Non Sq Epi: x / Bacteria: x        Vital Signs Last 24 Hrs  T(C): 36.4 (08 Sep 2023 07:47), Max: 36.6 (07 Sep 2023 20:47)  T(F): 97.5 (08 Sep 2023 07:47), Max: 97.9 (07 Sep 2023 20:47)  HR: 55 (08 Sep 2023 07:47) (55 - 63)  BP: 119/78 (08 Sep 2023 07:47) (115/64 - 122/60)  BP(mean): --  RR: 16 (08 Sep 2023 07:47) (16 - 16)  SpO2: 94% (08 Sep 2023 07:47) (94% - 97%)    Parameters below as of 08 Sep 2023 07:47  Patient On (Oxygen Delivery Method): room air    PHYSICAL EXAM  Constitutional - NAD, Comfortable  HEENT - NCAT, EOMI  Neck - Supple, No limited ROM  Chest - Breathing comfortably, No wheezing, clear lungs  Cardiovascular - S1S2   Abdomen - Soft, rounded, hyperactive  Extremities - No C/C/E, No calf tenderness  Left hip incision bandaged  Skin on sacrum is blanchable  Neurologic Exam -                    Cognitive - AAO to self, place, date, year, situation     Communication - Fluent, No dysarthria     Cranial Nerves - CN 2-12 intact     Motor - No focal deficits                    LEFT    UE - ShAB 5/5, EF 5-/5, EE 5/5, WE 5/5,  5/5                    RIGHT UE - ShAB 5/5, EF 5/5, EE 5/5, WE 5/5,  5/5                    LEFT    LE - HF at least 2/5, KE 5/5, DF 5/5, PF 5/5                    RIGHT LE - HF 4/5, KE 5/5 DF 5/5, PF 5/5        Sensory - Intact to LT     Reflexes - DTR Intact     Coordination - FTN intact     Balance - impaired  Psychiatric - Mood stable, Affect WNL    IDT meeting on 9/8    SW: Apt, ground level,     OT:  eating set up  grooming set up  UBD/LBD set up/min A  toileting min A/CG  tub/shower min A/CG  bathing min A/CG    PT:  amb 65ft CG RW  transfers min A/CG  stairs    SLP na    tentative dc home c HC 9/14        Continue comprehensive acute rehab program consisting of 3hrs/day of OT/PT . HPI:  89yo Female w/PMHx of HTN, hypothyroid/Graves, neuropathy, breast ca, BCC, community ambulator (walker) to ED HH on 8/26 from home s/p fall with severe left hip pain, inability to ambulate. XR hip shows left IT fracture. Ortho consulted. Underwent IM nail 8/27. Post op anemia- 1 unit PRBC. WBAT. Lovenox sq for DVT ppx. Leukocytosis w/up neg, UA neg. ARB held 2/2 hypotension. Evaluated by physical therapy/PMR and acute rehab recommended. Cleared for dc on 8/30.    CT Pelvis Bony Only No Cont (08.26.23 @ 19:57) >Acute impacted left femoral intertrochanteric fracture as described.       ROS/subjective:  Patient seen in OT, NAD in chair, mild pain at hip-prn Tramadol-tolerating therapy.  leg remains heavy  moved bowels   voiding well  Tylenol ATC, ICE , PRN Tramadol for pain   no dizziness, no headaches  no SOB, no chest pain  no nausea, no vomiting  using primafit at night  US LLE neg DVT- reports minimal Left LE pain but leg feels heavy      MEDICATIONS  (STANDING):  acetaminophen     Tablet .. 975 milliGRAM(s) Oral every 8 hours  atorvastatin 10 milliGRAM(s) Oral at bedtime  cholecalciferol 400 Unit(s) Oral daily  enoxaparin Injectable 40 milliGRAM(s) SubCutaneous every 24 hours  gabapentin 300 milliGRAM(s) Oral three times a day  levothyroxine 112 MICROGram(s) Oral daily  polyethylene glycol 3350 17 Gram(s) Oral daily  senna 2 Tablet(s) Oral at bedtime    MEDICATIONS  (PRN):  traMADol 50 milliGRAM(s) Oral three times a day PRN Severe Pain (7 - 10)                            9.4    10.03 )-----------( 286      ( 07 Sep 2023 06:00 )             29.3     09-07    138  |  102  |  23  ----------------------------<  96  4.5   |  33<H>  |  0.86    Ca    9.2      07 Sep 2023 06:00    TPro  6.7  /  Alb  3.1<L>  /  TBili  0.8  /  DBili  x   /  AST  22  /  ALT  23  /  AlkPhos  83  09-07    Urinalysis Basic - ( 07 Sep 2023 06:00 )    Color: x / Appearance: x / SG: x / pH: x  Gluc: 96 mg/dL / Ketone: x  / Bili: x / Urobili: x   Blood: x / Protein: x / Nitrite: x   Leuk Esterase: x / RBC: x / WBC x   Sq Epi: x / Non Sq Epi: x / Bacteria: x        Vital Signs Last 24 Hrs  T(C): 36.4 (08 Sep 2023 07:47), Max: 36.6 (07 Sep 2023 20:47)  T(F): 97.5 (08 Sep 2023 07:47), Max: 97.9 (07 Sep 2023 20:47)  HR: 55 (08 Sep 2023 07:47) (55 - 63)  BP: 119/78 (08 Sep 2023 07:47) (115/64 - 122/60)  BP(mean): --  RR: 16 (08 Sep 2023 07:47) (16 - 16)  SpO2: 94% (08 Sep 2023 07:47) (94% - 97%)    Parameters below as of 08 Sep 2023 07:47  Patient On (Oxygen Delivery Method): room air    PHYSICAL EXAM  Constitutional - NAD, Comfortable  HEENT - NCAT, EOMI  Neck - Supple, No limited ROM  Chest - Breathing comfortably, No wheezing, clear lungs  Cardiovascular - S1S2   Abdomen - Soft, rounded, hyperactive  Extremities - No C/C/E, No calf tenderness  Left hip incision bandaged- Silverlon removed- with staples - dry,intact  Skin on sacrum is blanchable  Neurologic Exam -                    Cognitive - AAO to self, place, date, year, situation     Communication - Fluent, No dysarthria     Cranial Nerves - CN 2-12 intact     Motor - No focal deficits                    LEFT    UE - ShAB 5/5, EF 5-/5, EE 5/5, WE 5/5,  5/5                    RIGHT UE - ShAB 5/5, EF 5/5, EE 5/5, WE 5/5,  5/5                    LEFT    LE - HF at least 2/5, KE 5/5, DF 5/5, PF 5/5                    RIGHT LE - HF 4/5, KE 5/5 DF 5/5, PF 5/5        Sensory - Intact to LT     Reflexes - DTR Intact     Coordination - FTN intact     Balance - impaired  Psychiatric - Mood stable, Affect WNL    IDT meeting on 9/8    SW: Apt, ground level,     OT:  eating set up  grooming set up  UBD/LBD set up/min A  toileting min A/CG  tub/shower min A/CG  bathing min A/CG    PT:  amb 65ft CG RW  transfers min A/CG  stairs    SLP na    tentative dc home c HC 9/14        Continue comprehensive acute rehab program consisting of 3hrs/day of OT/PT .

## 2023-09-08 NOTE — PROGRESS NOTE ADULT - ASSESSMENT
89yo Female s/p left IT fx s/p repair , now with decreased functional mobility, gait instability and ADL impairments.    COMORBIDITES/ACTIVE MEDICAL ISSUES     Gait Instability, ADL impairments and Functional impairments: Comprehensive Rehab Program of PT/OT     #Mechanical fall/left hip fracture:  -s/p im nail, WBAT  -pain control w/Tylenol q8h, ice and prn Tramadol  -incentive spirometry  -bowel regimen.  - fall precautions  - PT OT evaluations  - US LLE f/up edema-neg DVT    #Acute blood loss anemia:  -blood loss, s/p 1u PRBC post op  -Hgb stable    #Leukocytosis  -asymptomatic  -encourage PO fluids  no apparent source-? stress related- follow CBC    #Vit D insufficiency:  - supplement    #Hypothyroidism  -cont. Levothyroxine 112mcg    #Hypertension  -continue bb with hold parameters  -arb/ace on hold for borderline bp  BPs borderline 100-110s on current dose of Toprol-will decrease to 75mg for bradycardia 50s in elderly    #Hyperlipidemia  -continue statin.     #Peripheral Neuropathy  -cont. Gabapentin 300mg po tid    #Vte ppx  -lovenox    Pain Mgmt - Tylenol PRN  GI/Bowel Mgmt -  Senna,  Miralax   /Bladder Mgmt - Voiding independently, PVRx1      Precautions / PROPHYLAXIS:   - Falls, Cardiac  - Ortho: Weight bearing status: WBAT   - Lungs: Aspiration, Incentive Spirometer   - Pressure injury/Skin: Turn Q2hrs while in bed, OOB to Chair, PT/OT    - DVT: Lovenox   89yo Female s/p left IT fx s/p repair , now with decreased functional mobility, gait instability and ADL impairments.    COMORBIDITES/ACTIVE MEDICAL ISSUES     Gait Instability, ADL impairments and Functional impairments: Comprehensive Rehab Program of PT/OT     #Mechanical fall/left hip fracture:  -s/p im nail, WBAT  -pain control w/Tylenol q8h, ice and prn Tramadol  -incentive spirometry  -bowel regimen.  - fall precautions  - PT OT evaluations  - US LLE f/up edema-neg DVT    #Acute blood loss anemia:  -blood loss, s/p 1u PRBC post op  -Hgb stable    #Leukocytosis  -asymptomatic  -encourage PO fluids  no apparent source-? stress related- follow CBC  WBC 10.03 on 9/7    #Vit D insufficiency:  - supplement    #Hypothyroidism  -cont. Levothyroxine 112mcg    #Hypertension  -continue bb with hold parameters  -arb/ace on hold for borderline bp  BPs borderline 100-110s on current dose of Toprol-will decrease to 75mg for bradycardia 50s in elderly    #Hyperlipidemia  -continue statin.     #Peripheral Neuropathy  -cont. Gabapentin 300mg po tid    #Vte ppx  -lovenox    Pain Mgmt - Tylenol PRN  GI/Bowel Mgmt -  Senna,  Miralax   /Bladder Mgmt - Voiding independently, PVRx1      Precautions / PROPHYLAXIS:   - Falls, Cardiac  - Ortho: Weight bearing status: WBAT   - Lungs: Aspiration, Incentive Spirometer   - Pressure injury/Skin: Turn Q2hrs while in bed, OOB to Chair, PT/OT    - DVT: Lovenox    PATIENT IS A GOOD CANDIDATE FOR [facility] BED 2/2 HER RECENT LEFT HIP FRACTURE . PATIENT REQUIRES FREQUENT POSITION CHANGES OR IMMEDIATE NEED FOR A CHANGE OF POSITION ,REQUIRES POSITIONING OF THE BODY TO ALLEVIATE PAIN, PREVENT CONTRACTURES AND AVOID RESPIRATORY INFECTIONS IN WAYS NOT FEASIBLE IN ORDINARY BED.

## 2023-09-08 NOTE — PROGRESS NOTE ADULT - ASSESSMENT
89yo Female s/p left IT fx s/p repair , now with decreased functional mobility, gait instability and ADL impairments.    #Mechanical fall/left hip fracture:  -s/p im nail, WBAT    #Acute blood loss anemia, likely post operative blood loss, stable now  -blood loss, s/p 1u PRBC post op  -Transfuse if Hg <7    #Vit D insufficiency:  - supplement    #Hypothyroidism  -cont. Levothyroxine 112mcg    #Hypertension  -c/w toprol XL 75mg daily    #Hyperlipidemia  -c/w lipitor 10mg qhs    #Peripheral Neuropathy  -cont. Gabapentin 300mg po tid    DVT PPX: lovenox
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
## 2023-09-09 DIAGNOSIS — E55.9 VITAMIN D DEFICIENCY, UNSPECIFIED: ICD-10-CM
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
## 2023-09-09 DIAGNOSIS — S72.142A DISPLACED INTERTROCHANTERIC FRACTURE OF LEFT FEMUR, INITIAL ENCOUNTER FOR CLOSED FRACTURE: ICD-10-CM

## 2023-09-09 DIAGNOSIS — Z92.21 PERSONAL HISTORY OF ANTINEOPLASTIC CHEMOTHERAPY: ICD-10-CM

## 2023-09-09 DIAGNOSIS — Z85.3 PERSONAL HISTORY OF MALIGNANT NEOPLASM OF BREAST: ICD-10-CM

## 2023-09-09 DIAGNOSIS — Z79.899 OTHER LONG TERM (CURRENT) DRUG THERAPY: ICD-10-CM

## 2023-09-09 DIAGNOSIS — G62.9 POLYNEUROPATHY, UNSPECIFIED: ICD-10-CM

## 2023-09-09 DIAGNOSIS — Z79.890 HORMONE REPLACEMENT THERAPY: ICD-10-CM

## 2023-09-09 DIAGNOSIS — Z92.3 PERSONAL HISTORY OF IRRADIATION: ICD-10-CM

## 2023-09-09 DIAGNOSIS — D62 ACUTE POSTHEMORRHAGIC ANEMIA: ICD-10-CM

## 2023-09-09 DIAGNOSIS — E78.5 HYPERLIPIDEMIA, UNSPECIFIED: ICD-10-CM

## 2023-09-09 DIAGNOSIS — Z85.828 PERSONAL HISTORY OF OTHER MALIGNANT NEOPLASM OF SKIN: ICD-10-CM

## 2023-09-09 PROCEDURE — 99232 SBSQ HOSP IP/OBS MODERATE 35: CPT

## 2023-09-09 RX ADMIN — Medication 975 MILLIGRAM(S): at 21:18

## 2023-09-09 RX ADMIN — Medication 975 MILLIGRAM(S): at 05:49

## 2023-09-09 RX ADMIN — GABAPENTIN 300 MILLIGRAM(S): 400 CAPSULE ORAL at 21:18

## 2023-09-09 RX ADMIN — Medication 112 MICROGRAM(S): at 05:49

## 2023-09-09 RX ADMIN — GABAPENTIN 300 MILLIGRAM(S): 400 CAPSULE ORAL at 13:06

## 2023-09-09 RX ADMIN — Medication 975 MILLIGRAM(S): at 13:52

## 2023-09-09 RX ADMIN — POLYETHYLENE GLYCOL 3350 17 GRAM(S): 17 POWDER, FOR SOLUTION ORAL at 13:04

## 2023-09-09 RX ADMIN — Medication 975 MILLIGRAM(S): at 06:00

## 2023-09-09 RX ADMIN — Medication 975 MILLIGRAM(S): at 13:05

## 2023-09-09 RX ADMIN — ENOXAPARIN SODIUM 40 MILLIGRAM(S): 100 INJECTION SUBCUTANEOUS at 05:49

## 2023-09-09 RX ADMIN — Medication 400 UNIT(S): at 13:05

## 2023-09-09 RX ADMIN — ATORVASTATIN CALCIUM 10 MILLIGRAM(S): 80 TABLET, FILM COATED ORAL at 21:18

## 2023-09-09 RX ADMIN — GABAPENTIN 300 MILLIGRAM(S): 400 CAPSULE ORAL at 05:49

## 2023-09-09 NOTE — PROGRESS NOTE ADULT - SUBJECTIVE AND OBJECTIVE BOX
chase is a 89y old  female who presents with a chief complaint of s/p left IT fx s/p repair      SUBJECTIVE / OVERNIGHT EVENTS: Patient seen and examined. No acute events overnight. Pain well controlled and patient without any complaints.    MEDICATIONS  (STANDING):  acetaminophen     Tablet .. 975 milliGRAM(s) Oral every 8 hours  atorvastatin 10 milliGRAM(s) Oral at bedtime  cholecalciferol 400 Unit(s) Oral daily  enoxaparin Injectable 40 milliGRAM(s) SubCutaneous every 24 hours  gabapentin 300 milliGRAM(s) Oral three times a day  levothyroxine 112 MICROGram(s) Oral daily  metoprolol succinate  milliGRAM(s) Oral daily  polyethylene glycol 3350 17 Gram(s) Oral daily  senna 2 Tablet(s) Oral at bedtime    MEDICATIONS  (PRN):  oxyCODONE    IR 5 milliGRAM(s) Oral every 6 hours PRN Severe Pain (7 - 10)  traMADol 50 milliGRAM(s) Oral every 6 hours PRN Moderate Pain (4 - 6)    Vital Signs Last 24 Hrs  T(C): 36.4 (09 Sep 2023 07:36), Max: 36.8 (08 Sep 2023 22:42)  T(F): 97.5 (09 Sep 2023 07:36), Max: 98.2 (08 Sep 2023 22:42)  HR: 55 (09 Sep 2023 07:36) (54 - 64)  BP: 119/78 (09 Sep 2023 07:36) (117/61 - 119/78)  RR: 14 (09 Sep 2023 07:36) (14 - 16)  SpO2: 94% (09 Sep 2023 07:36) (94% - 97%)    Parameters below as of 09 Sep 2023 07:36  Patient On (Oxygen Delivery Method): room air    REVIEW OF SYSTEMS:  For ROV please refer back to H&P     PHYSICAL EXAM:  GENERAL: NAD, well-groomed, well-developed  HEAD:  Atraumatic, Normocephalic  EYES: EOMI, PERRLA, conjunctiva and sclera clear  ENMT: No tonsillar erythema, exudates, or enlargement; Moist mucous membranes, Good dentition, No lesions  NECK: Supple, No JVD, Normal thyroid  NERVOUS SYSTEM:  Alert & Oriented X3  CHEST/LUNG: Clear to percussion bilaterally; No rales, rhonchi, wheezing, or rubs  HEART: Regular rate and rhythm; No murmurs, rubs, or gallops  ABDOMEN: Soft, Nontender, Nondistended; Bowel sounds present  EXTREMITIES:  2+ Peripheral Pulses, No clubbing, cyanosis, or edema      LABS    09-07    138  |  102  |  23  ----------------------------<  96  4.5   |  33<H>  |  0.86    Ca    9.2      07 Sep 2023 06:00    TPro  6.7  /  Alb  3.1<L>  /  TBili  0.8  /  DBili  x   /  AST  22  /  ALT  23  /  AlkPhos  83  09-07                        9.4    10.03 )-----------( 286      ( 07 Sep 2023 06:00 )             29.3

## 2023-09-09 NOTE — PROGRESS NOTE ADULT - ASSESSMENT
Imp: Patient with diagnosis of  s/p left IT fx s/p repair  admitted for comprehensive acute rehabilitation.    Plan:  - Continue PT/OT  - DVT prophylaxis - lovenox   - Skin- Turn q2h, check skin daily  - Continue current medications; patient medically stable.   - Patient is stable to continue current rehabilitation program.

## 2023-09-09 NOTE — PROGRESS NOTE ADULT - SUBJECTIVE AND OBJECTIVE BOX
Cc: s/p left IT fx s/p repair    HPI: Patient with no new medical issues today. Patient seen and examined at bedside. No acute overnight events. Slept well and is participating in therapy. No new complaints.   Pain controlled, no chest pain, no N/V, no Fevers/Chills. No other new ROS  Has been tolerating rehabilitation program.    acetaminophen     Tablet .. 975 milliGRAM(s) Oral every 8 hours  atorvastatin 10 milliGRAM(s) Oral at bedtime  cholecalciferol 400 Unit(s) Oral daily  enoxaparin Injectable 40 milliGRAM(s) SubCutaneous every 24 hours  gabapentin 300 milliGRAM(s) Oral three times a day  levothyroxine 112 MICROGram(s) Oral daily  metoprolol succinate ER 75 milliGRAM(s) Oral daily  polyethylene glycol 3350 17 Gram(s) Oral daily  senna 2 Tablet(s) Oral at bedtime  traMADol 50 milliGRAM(s) Oral three times a day PRN      T(C): 36.4 (09-09-23 @ 07:36), Max: 36.8 (09-08-23 @ 22:42)  HR: 55 (09-09-23 @ 07:36) (54 - 64)  BP: 119/78 (09-09-23 @ 07:36) (117/61 - 119/78)  RR: 14 (09-09-23 @ 07:36) (14 - 16)  SpO2: 94% (09-09-23 @ 07:36) (94% - 97%)    In NAD  HEENT- EOMI  Heart- RRR, S1S2  Lungs- CTA bl.  Abd- + BS, NT  Ext- +surgical dressing intact. no surrounding erythema, swelling or discharge.   Neuro- Exam unchanged

## 2023-09-10 PROCEDURE — 99232 SBSQ HOSP IP/OBS MODERATE 35: CPT

## 2023-09-10 RX ORDER — LIDOCAINE 4 G/100G
1 CREAM TOPICAL THREE TIMES A DAY
Refills: 0 | Status: DISCONTINUED | OUTPATIENT
Start: 2023-09-10 | End: 2023-09-14

## 2023-09-10 RX ADMIN — GABAPENTIN 300 MILLIGRAM(S): 400 CAPSULE ORAL at 21:14

## 2023-09-10 RX ADMIN — GABAPENTIN 300 MILLIGRAM(S): 400 CAPSULE ORAL at 05:54

## 2023-09-10 RX ADMIN — Medication 75 MILLIGRAM(S): at 05:53

## 2023-09-10 RX ADMIN — Medication 975 MILLIGRAM(S): at 05:53

## 2023-09-10 RX ADMIN — Medication 975 MILLIGRAM(S): at 14:00

## 2023-09-10 RX ADMIN — Medication 975 MILLIGRAM(S): at 21:13

## 2023-09-10 RX ADMIN — GABAPENTIN 300 MILLIGRAM(S): 400 CAPSULE ORAL at 13:28

## 2023-09-10 RX ADMIN — Medication 112 MICROGRAM(S): at 05:54

## 2023-09-10 RX ADMIN — Medication 400 UNIT(S): at 13:29

## 2023-09-10 RX ADMIN — Medication 975 MILLIGRAM(S): at 05:33

## 2023-09-10 RX ADMIN — ATORVASTATIN CALCIUM 10 MILLIGRAM(S): 80 TABLET, FILM COATED ORAL at 21:13

## 2023-09-10 RX ADMIN — ENOXAPARIN SODIUM 40 MILLIGRAM(S): 100 INJECTION SUBCUTANEOUS at 05:53

## 2023-09-10 RX ADMIN — Medication 975 MILLIGRAM(S): at 13:28

## 2023-09-10 NOTE — PROGRESS NOTE ADULT - ASSESSMENT
Imp: Patient with diagnosis of  s/p left IT fx s/p repair  admitted for comprehensive acute rehabilitation.    Plan:  - Continue PT/OT  - DVT prophylaxis - lovenox   - Skin- Turn q2h, check skin daily  - R foot neuroma pain - will order lidocaine. Consider increasing gabapentin. Outpt follow up with podiatrist. patient has received injections in the past.   - Continue current medications; patient medically stable.   - Patient is stable to continue current rehabilitation program.

## 2023-09-10 NOTE — PROGRESS NOTE ADULT - SUBJECTIVE AND OBJECTIVE BOX
Cc: s/p left IT fx s/p repair    HPI: Patient with no new medical issues today. Patient seen and examined at bedside. No acute overnight events. Poor sleep due to loud roommate. Has mild exacerbation of pain from R foot neuroma. No new complaints.   Pain controlled, no chest pain, no N/V, no Fevers/Chills. No other new ROS  Has been tolerating rehabilitation program.    acetaminophen     Tablet .. 975 milliGRAM(s) Oral every 8 hours  atorvastatin 10 milliGRAM(s) Oral at bedtime  cholecalciferol 400 Unit(s) Oral daily  enoxaparin Injectable 40 milliGRAM(s) SubCutaneous every 24 hours  gabapentin 300 milliGRAM(s) Oral three times a day  levothyroxine 112 MICROGram(s) Oral daily  metoprolol succinate ER 75 milliGRAM(s) Oral daily  polyethylene glycol 3350 17 Gram(s) Oral daily  senna 2 Tablet(s) Oral at bedtime  traMADol 50 milliGRAM(s) Oral three times a day PRN      T(C): 36.4 (09-09-23 @ 07:36), Max: 36.8 (09-08-23 @ 22:42)  HR: 55 (09-09-23 @ 07:36) (54 - 64)  BP: 119/78 (09-09-23 @ 07:36) (117/61 - 119/78)  RR: 14 (09-09-23 @ 07:36) (14 - 16)  SpO2: 94% (09-09-23 @ 07:36) (94% - 97%)    In NAD  HEENT- EOMI  Heart- RRR, S1S2  Lungs- CTA bl.  Abd- + BS, NT  Ext- +surgical dressing intact. no surrounding erythema, swelling or discharge. mild TTP at right foot 4th digit   Neuro- Exam unchanged

## 2023-09-11 LAB
ALBUMIN SERPL ELPH-MCNC: 3 G/DL — LOW (ref 3.3–5)
ALP SERPL-CCNC: 88 U/L — SIGNIFICANT CHANGE UP (ref 40–120)
ALT FLD-CCNC: 21 U/L — SIGNIFICANT CHANGE UP (ref 10–45)
ANION GAP SERPL CALC-SCNC: 6 MMOL/L — SIGNIFICANT CHANGE UP (ref 5–17)
AST SERPL-CCNC: 20 U/L — SIGNIFICANT CHANGE UP (ref 10–40)
BILIRUB SERPL-MCNC: 0.5 MG/DL — SIGNIFICANT CHANGE UP (ref 0.2–1.2)
BUN SERPL-MCNC: 24 MG/DL — HIGH (ref 7–23)
CALCIUM SERPL-MCNC: 9.1 MG/DL — SIGNIFICANT CHANGE UP (ref 8.4–10.5)
CHLORIDE SERPL-SCNC: 103 MMOL/L — SIGNIFICANT CHANGE UP (ref 96–108)
CO2 SERPL-SCNC: 29 MMOL/L — SIGNIFICANT CHANGE UP (ref 22–31)
CREAT SERPL-MCNC: 0.9 MG/DL — SIGNIFICANT CHANGE UP (ref 0.5–1.3)
EGFR: 60 ML/MIN/1.73M2 — SIGNIFICANT CHANGE UP
GLUCOSE SERPL-MCNC: 90 MG/DL — SIGNIFICANT CHANGE UP (ref 70–99)
HCT VFR BLD CALC: 30.5 % — LOW (ref 34.5–45)
HGB BLD-MCNC: 9.9 G/DL — LOW (ref 11.5–15.5)
MCHC RBC-ENTMCNC: 32 PG — SIGNIFICANT CHANGE UP (ref 27–34)
MCHC RBC-ENTMCNC: 32.5 GM/DL — SIGNIFICANT CHANGE UP (ref 32–36)
MCV RBC AUTO: 98.7 FL — SIGNIFICANT CHANGE UP (ref 80–100)
NRBC # BLD: 0 /100 WBCS — SIGNIFICANT CHANGE UP (ref 0–0)
PLATELET # BLD AUTO: 302 K/UL — SIGNIFICANT CHANGE UP (ref 150–400)
POTASSIUM SERPL-MCNC: 4.6 MMOL/L — SIGNIFICANT CHANGE UP (ref 3.5–5.3)
POTASSIUM SERPL-SCNC: 4.6 MMOL/L — SIGNIFICANT CHANGE UP (ref 3.5–5.3)
PROT SERPL-MCNC: 7.2 G/DL — SIGNIFICANT CHANGE UP (ref 6–8.3)
RBC # BLD: 3.09 M/UL — LOW (ref 3.8–5.2)
RBC # FLD: 16.9 % — HIGH (ref 10.3–14.5)
SODIUM SERPL-SCNC: 138 MMOL/L — SIGNIFICANT CHANGE UP (ref 135–145)
WBC # BLD: 10.46 K/UL — SIGNIFICANT CHANGE UP (ref 3.8–10.5)
WBC # FLD AUTO: 10.46 K/UL — SIGNIFICANT CHANGE UP (ref 3.8–10.5)

## 2023-09-11 PROCEDURE — 99232 SBSQ HOSP IP/OBS MODERATE 35: CPT | Mod: GC

## 2023-09-11 RX ADMIN — Medication 975 MILLIGRAM(S): at 21:19

## 2023-09-11 RX ADMIN — Medication 975 MILLIGRAM(S): at 13:35

## 2023-09-11 RX ADMIN — LIDOCAINE 1 APPLICATION(S): 4 CREAM TOPICAL at 21:21

## 2023-09-11 RX ADMIN — ENOXAPARIN SODIUM 40 MILLIGRAM(S): 100 INJECTION SUBCUTANEOUS at 05:51

## 2023-09-11 RX ADMIN — GABAPENTIN 300 MILLIGRAM(S): 400 CAPSULE ORAL at 13:00

## 2023-09-11 RX ADMIN — Medication 112 MICROGRAM(S): at 05:51

## 2023-09-11 RX ADMIN — Medication 975 MILLIGRAM(S): at 13:00

## 2023-09-11 RX ADMIN — Medication 975 MILLIGRAM(S): at 05:52

## 2023-09-11 RX ADMIN — ATORVASTATIN CALCIUM 10 MILLIGRAM(S): 80 TABLET, FILM COATED ORAL at 21:20

## 2023-09-11 RX ADMIN — GABAPENTIN 300 MILLIGRAM(S): 400 CAPSULE ORAL at 05:52

## 2023-09-11 RX ADMIN — GABAPENTIN 300 MILLIGRAM(S): 400 CAPSULE ORAL at 21:20

## 2023-09-11 RX ADMIN — Medication 400 UNIT(S): at 11:58

## 2023-09-11 RX ADMIN — Medication 75 MILLIGRAM(S): at 05:53

## 2023-09-11 RX ADMIN — Medication 975 MILLIGRAM(S): at 22:19

## 2023-09-11 NOTE — CHART NOTE - NSCHARTNOTEFT_GEN_A_CORE
Nutrition Follow Up Note  Hospital Course   (Per Electronic Medical Record)    Source:  Patient [X]  Medical Record [X]      Diet:   Regular Diet (IDDSI Level 7) w/ Thin Liquids (IDDSI Level 0)  Tolerates Diet Consistency Well  No Chewing/Swallowing Difficulties  No Recent Nausea, Vomiting, Diarrhea or Constipation (as Per Patient)  Consumes % of Meals (as Per Documentation) - State Good PO Intake/Appetite (Per Patient)  Obtained Food Preferences from Patient    Enteral/Parenteral Nutrition: Not Applicable    Current Weight: 178.3lb on 8/30  Obtain New Weight  Obtain Weights Weekly     Pertinent Medications: MEDICATIONS  (STANDING):  acetaminophen     Tablet .. 975 milliGRAM(s) Oral every 8 hours  atorvastatin 10 milliGRAM(s) Oral at bedtime  cholecalciferol 400 Unit(s) Oral daily  enoxaparin Injectable 40 milliGRAM(s) SubCutaneous every 24 hours  gabapentin 300 milliGRAM(s) Oral three times a day  levothyroxine 112 MICROGram(s) Oral daily  metoprolol succinate ER 75 milliGRAM(s) Oral daily  polyethylene glycol 3350 17 Gram(s) Oral daily  senna 2 Tablet(s) Oral at bedtime    MEDICATIONS  (PRN):  lidocaine 5% Ointment 1 Application(s) Topical three times a day PRN right foot neuroma pain  traMADol 50 milliGRAM(s) Oral three times a day PRN Severe Pain (7 - 10)    Pertinent Labs:  09-11 Na138 mmol/L Glu 90 mg/dL K+ 4.6 mmol/L Cr  0.90 mg/dL BUN 24 mg/dL<H> 09-11 Alb 3.0 g/dL<L>    Skin: No Pressure Ulcers  Surgical Incision on Left Hip  (as Per Nursing Flow Sheet)     Edema: None Noted Recently (as Per Documentation)     Last Bowel Movement: on 9/10    Estimated Needs:   [X] No Change Since Previous Assessment    Previous Nutrition Diagnosis:   Obese    Nutrition Diagnosis is [X] Ongoing - Continue Nutrition Plan of Care     New Nutrition Diagnosis: [X] Not Applicable    Interventions:   1. Recommend Continue Nutrition Plan of Care     Monitoring & Evaluation:   [X] Weights   [X] PO Intake   [X] Skin Integrity   [X] Follow Up (Per Protocol)  [X] Tolerance to Diet Prescription   [X] Other: Labs     Registered Dietitian/Nutritionist Remains Available.  Barry Zuniga, MONIKAN, CDN    Phone# [phone]

## 2023-09-11 NOTE — CHART NOTE - NSCHARTNOTEFT_GEN_A_CORE
IDT meeting on 9/11    SW: Apt, ground level,     OT:  eating set up  grooming set up  UBD/LBD set up/min A  toileting CG  tub/shower min A/CG  bathing min A/CG    PT:  amb 80ft CG RW  transfers CG  stairs 6 CG    SLP na    tentative dc home c  9/14

## 2023-09-11 NOTE — PROGRESS NOTE ADULT - SUBJECTIVE AND OBJECTIVE BOX
HPI:  89yo Female w/PMHx of HTN, hypothyroid/Graves, neuropathy, breast ca, BCC, community ambulator (walker) to ED HH on 8/26 from home s/p fall with severe left hip pain, inability to ambulate. XR hip shows left IT fracture. Ortho consulted. Underwent IM nail 8/27. Post op anemia- 1 unit PRBC. WBAT. Lovenox sq for DVT ppx. Leukocytosis w/up neg, UA neg. ARB held 2/2 hypotension. Evaluated by physical therapy/PMR and acute rehab recommended. Cleared for dc on 8/30.    CT Pelvis Bony Only No Cont (08.26.23 @ 19:57) >Acute impacted left femoral intertrochanteric fracture as described.       ROS/subjective:  Patient seen bedside this AM  no events over weekend  soome gluteal pain- will add lidoderm patch  moving bowels daily- small amount  voiding well  Tylenol ATC, ICE , PRN Tramadol for pain   no dizziness, no headaches  no SOB, no chest pain  no nausea, no vomiting  using primafit at night  US LLE neg DVT- reports minimal Left LE pain but leg feels heavy          MEDICATIONS  (STANDING):  acetaminophen     Tablet .. 975 milliGRAM(s) Oral every 8 hours  atorvastatin 10 milliGRAM(s) Oral at bedtime  cholecalciferol 400 Unit(s) Oral daily  enoxaparin Injectable 40 milliGRAM(s) SubCutaneous every 24 hours  gabapentin 300 milliGRAM(s) Oral three times a day  levothyroxine 112 MICROGram(s) Oral daily  metoprolol succinate ER 75 milliGRAM(s) Oral daily  polyethylene glycol 3350 17 Gram(s) Oral daily  senna 2 Tablet(s) Oral at bedtime    MEDICATIONS  (PRN):  lidocaine 5% Ointment 1 Application(s) Topical three times a day PRN right foot neuroma pain  traMADol 50 milliGRAM(s) Oral three times a day PRN Severe Pain (7 - 10)                            9.9    10.46 )-----------( 302      ( 11 Sep 2023 06:15 )             30.5     09-11    138  |  103  |  24<H>  ----------------------------<  90  4.6   |  29  |  0.90    Ca    9.1      11 Sep 2023 06:15    TPro  7.2  /  Alb  3.0<L>  /  TBili  0.5  /  DBili  x   /  AST  20  /  ALT  21  /  AlkPhos  88  09-11    Urinalysis Basic - ( 11 Sep 2023 06:15 )    Color: x / Appearance: x / SG: x / pH: x  Gluc: 90 mg/dL / Ketone: x  / Bili: x / Urobili: x   Blood: x / Protein: x / Nitrite: x   Leuk Esterase: x / RBC: x / WBC x   Sq Epi: x / Non Sq Epi: x / Bacteria: x        CAPILLARY BLOOD GLUCOSE          Vital Signs Last 24 Hrs  T(C): 36.8 (11 Sep 2023 08:09), Max: 36.8 (11 Sep 2023 08:09)  T(F): 98.2 (11 Sep 2023 08:09), Max: 98.2 (11 Sep 2023 08:09)  HR: 56 (11 Sep 2023 08:09) (56 - 68)  BP: 114/66 (11 Sep 2023 08:09) (114/66 - 121/75)  BP(mean): --  RR: 15 (11 Sep 2023 08:09) (15 - 15)  SpO2: 94% (11 Sep 2023 08:09) (94% - 99%)    Parameters below as of 11 Sep 2023 08:09  Patient On (Oxygen Delivery Method): room air            PHYSICAL EXAM  Constitutional - NAD, Comfortable  HEENT - NCAT, EOMI  Neck - Supple, No limited ROM  Chest - Breathing comfortably, No wheezing, clear lungs  Cardiovascular - S1S2   Abdomen - Soft, rounded, hyperactive  Extremities - No C/C/E, No calf tenderness  Left hip incision bandaged- Silverlon removed- staples Dcd today- incision dry and intact  Skin on sacrum is blanchable  Neurologic Exam -                    Cognitive - AAO to self, place, date, year, situation     Communication - Fluent, No dysarthria     Cranial Nerves - CN 2-12 intact     Motor - No focal deficits                    LEFT    UE - ShAB 5/5, EF 5-/5, EE 5/5, WE 5/5,  5/5                    RIGHT UE - ShAB 5/5, EF 5/5, EE 5/5, WE 5/5,  5/5                    LEFT    LE - HF at least 2/5, KE 5/5, DF 5/5, PF 5/5                    RIGHT LE - HF 4/5, KE 5/5 DF 5/5, PF 5/5        Sensory - Intact to LT     Reflexes - DTR Intact     Coordination - FTN intact     Balance - impaired  Psychiatric - Mood stable, Affect WNL    DT meeting on 9/11    SW: Apt, ground level,     OT:  eating set up  grooming set up  UBD/LBD set up/min A  toileting CG  tub/shower min A/CG  bathing min A/CG    PT:  amb 80ft CG RW  transfers CG  stairs 6 CG    SLP na    tentative dc home c HC 9/14.      Continue comprehensive acute rehab program consisting of 3hrs/day of OT/PT .

## 2023-09-11 NOTE — PROGRESS NOTE ADULT - ASSESSMENT
89yo Female s/p left IT fx s/p repair , now with decreased functional mobility, gait instability and ADL impairments.    COMORBIDITES/ACTIVE MEDICAL ISSUES     Gait Instability, ADL impairments and Functional impairments: Comprehensive Rehab Program of PT/OT     #Mechanical fall/left hip fracture:  -s/p im nail, WBAT  -pain control w/Tylenol q8h, ice and prn Tramadol  -incentive spirometry  -bowel regimen.  - fall precautions  - PT OT evaluations  - US LLE f/up edema-neg DVT  soc service to contact patient's daughter regarding family training, A on DC    #Acute blood loss anemia:  -blood loss, s/p 1u PRBC post op  -Hgb stable    #Leukocytosis  -asymptomatic  -encourage PO fluids  no apparent source-? stress related- follow CBC  WBC 10.03 on 9/7    #Vit D insufficiency:  - supplement    #Hypothyroidism  -cont. Levothyroxine 112mcg    #Hypertension  -continue bb with hold parameters  -arb/ace on hold for borderline bp  BPs borderline 100-110s on current dose of Toprol-will decrease to 75mg for bradycardia 50s in elderly  BPs [id number]    #Hyperlipidemia  -continue statin.     #Peripheral Neuropathy  -cont. Gabapentin 300mg po tid    #Vte ppx  -lovenox    Pain Mgmt - Tylenol PRN  GI/Bowel Mgmt -  Senna,  Miralax   /Bladder Mgmt - Voiding independently, PVRx1      Precautions / PROPHYLAXIS:   - Falls, Cardiac  - Ortho: Weight bearing status: WBAT   - Lungs: Aspiration, Incentive Spirometer   - Pressure injury/Skin: Turn Q2hrs while in bed, OOB to Chair, PT/OT    - DVT: Lovenox- will contact ortho ? switch to ASA on DC    PATIENT IS A GOOD CANDIDATE FOR [facility] HOSPITAL BED 2/2 HER RECENT LEFT HIP FRACTURE . PATIENT REQUIRES FREQUENT POSITION CHANGES OR IMMEDIATE NEED FOR A CHANGE OF POSITION ,REQUIRES POSITIONING OF THE BODY TO ALLEVIATE PAIN, PREVENT CONTRACTURES AND AVOID RESPIRATORY INFECTIONS IN WAYS NOT FEASIBLE IN ORDINARY BED.

## 2023-09-12 ENCOUNTER — TRANSCRIPTION ENCOUNTER (OUTPATIENT)
Age: 88
End: 2023-09-12

## 2023-09-12 PROCEDURE — 99232 SBSQ HOSP IP/OBS MODERATE 35: CPT | Mod: GC

## 2023-09-12 RX ORDER — METOPROLOL TARTRATE 50 MG
50 TABLET ORAL DAILY
Refills: 0 | Status: DISCONTINUED | OUTPATIENT
Start: 2023-09-13 | End: 2023-09-14

## 2023-09-12 RX ADMIN — Medication 75 MILLIGRAM(S): at 05:42

## 2023-09-12 RX ADMIN — GABAPENTIN 300 MILLIGRAM(S): 400 CAPSULE ORAL at 21:17

## 2023-09-12 RX ADMIN — GABAPENTIN 300 MILLIGRAM(S): 400 CAPSULE ORAL at 14:23

## 2023-09-12 RX ADMIN — ENOXAPARIN SODIUM 40 MILLIGRAM(S): 100 INJECTION SUBCUTANEOUS at 05:41

## 2023-09-12 RX ADMIN — Medication 112 MICROGRAM(S): at 05:42

## 2023-09-12 RX ADMIN — Medication 975 MILLIGRAM(S): at 05:42

## 2023-09-12 RX ADMIN — Medication 975 MILLIGRAM(S): at 15:00

## 2023-09-12 RX ADMIN — ATORVASTATIN CALCIUM 10 MILLIGRAM(S): 80 TABLET, FILM COATED ORAL at 21:18

## 2023-09-12 RX ADMIN — GABAPENTIN 300 MILLIGRAM(S): 400 CAPSULE ORAL at 05:41

## 2023-09-12 RX ADMIN — Medication 975 MILLIGRAM(S): at 21:18

## 2023-09-12 RX ADMIN — Medication 400 UNIT(S): at 12:35

## 2023-09-12 RX ADMIN — SENNA PLUS 2 TABLET(S): 8.6 TABLET ORAL at 21:18

## 2023-09-12 RX ADMIN — Medication 975 MILLIGRAM(S): at 14:23

## 2023-09-12 NOTE — PROGRESS NOTE ADULT - NS ATTEND AMEND GEN_ALL_CORE FT
Rehab Attending- Patient seen and examined by me- Case discussed, above note reviewed by me with modifications made    patient seen and evaluated in PT  no issues overnight  left leg heavy- minimal pain  last BM 9/11 voiding well  family training tomorrow- family to obtain HHA on DC  to continue intensive rehab program      Vital Signs Last 24 Hrs  T(C): 36.7 (12 Sep 2023 08:15), Max: 36.7 (12 Sep 2023 08:15)  T(F): 98.1 (12 Sep 2023 08:15), Max: 98.1 (12 Sep 2023 08:15)  HR: 64 (12 Sep 2023 08:15) (61 - 64)  BP: 113/57 (12 Sep 2023 08:15) (113/57 - 120/79)  BP(mean): --  RR: 14 (12 Sep 2023 08:15) (14 - 14)  SpO2: 99% (12 Sep 2023 08:15) (99% - 99%)    Parameters below as of 12 Sep 2023 08:15  Patient On (Oxygen Delivery Method): room air        PHYSICAL EXAM  Constitutional - NAD, Comfortable  HEENT - NCAT, EOMI  Neck - Supple, No limited ROM  Chest - Breathing comfortably, No wheezing, clear lungs  Cardiovascular - S1S2   Abdomen - Soft, rounded, hyperactive  Extremities - No C/C/E, No calf tenderness  Left hip incision bandaged- Silverlon removed- staples Dcd -incision dry and intact  Neurologic Exam -                    Cognitive - AAO to self, place, date, year, situation     Communication - Fluent, No dysarthria     Cranial Nerves - CN 2-12 intact     Motor - No focal deficits                    LEFT    UE - ShAB 5/5, EF 5-/5, EE 5/5, WE 5/5,  5/5                    RIGHT UE - ShAB 5/5, EF 5/5, EE 5/5, WE 5/5,  5/5                    LEFT    LE - HF at least 2/5, KE 5/5, DF 5/5, PF 5/5                    RIGHT LE - HF 4/5, KE 5/5 DF 5/5, PF 5/5        Sensory - Intact to LT     Reflexes - DTR Intact     Coordination - FTN intact     Balance - impaired  Psychiatric - Mood stable, Affect WNL

## 2023-09-12 NOTE — PROGRESS NOTE ADULT - ASSESSMENT
89yo Female s/p left IT fx s/p repair , now with decreased functional mobility, gait instability and ADL impairments.    COMORBIDITES/ACTIVE MEDICAL ISSUES     Gait Instability, ADL impairments and Functional impairments: Comprehensive Rehab Program of PT/OT     #Mechanical fall/left hip fracture:  -s/p im nail, WBAT  -pain control w/Tylenol q8h, ice and prn Tramadol  -incentive spirometry  -bowel regimen.  - fall precautions  - PT OT evaluations  - US LLE f/up edema-neg DVT  soc service to contact patient's daughter regarding family training, HHA on DC    #Acute blood loss anemia:  -blood loss, s/p 1u PRBC post op  -Hgb stable    #Leukocytosis  -asymptomatic  -encourage PO fluids  no apparent source-? stress related- follow CBC    #Vit D insufficiency:  - supplement    #Hypothyroidism  -cont. Levothyroxine 112mcg    #Hypertension  -continue bb with hold parameters  -arb/ace on hold for borderline bp  -BPs borderline 100-110s on current dose of Toprol-will decrease to 50mg  BPs [id number], HR 50-60s    #Hyperlipidemia  -continue statin.     #Peripheral Neuropathy  -cont. Gabapentin 300mg po tid    #Vte ppx  -lovenox    Pain Mgmt - Tylenol PRN  GI/Bowel Mgmt -  Senna,  Miralax   /Bladder Mgmt - Voiding independently, PVRx1      Precautions / PROPHYLAXIS:   - Falls, Cardiac  - Ortho: Weight bearing status: WBAT   - Lungs: Aspiration, Incentive Spirometer   - Pressure injury/Skin: Turn Q2hrs while in bed, OOB to Chair, PT/OT    - DVT: Lovenox- will contact ortho ? switch to ASA on DC, mssg sent to Dr Kramer on 9/11-awaiting response    PATIENT IS A GOOD CANDIDATE FOR [facility] BED 2/2 HER RECENT LEFT HIP FRACTURE . PATIENT REQUIRES FREQUENT POSITION CHANGES OR IMMEDIATE NEED FOR A CHANGE OF POSITION ,REQUIRES POSITIONING OF THE BODY TO ALLEVIATE PAIN, PREVENT CONTRACTURES AND AVOID RESPIRATORY INFECTIONS IN WAYS NOT FEASIBLE IN ORDINARY BED.

## 2023-09-12 NOTE — DISCHARGE NOTE NURSING/CASE MANAGEMENT/SOCIAL WORK - NSFLUVACAGEDISCH_IMM_ALL_CORE
Anesthesia Evaluation      No history of anesthetic complications     Airway   Mallampati: II  Neck ROM: full   Pulmonary     breath sounds clear to auscultation  (-) asthma, sleep apnea, not a smoker                         Cardiovascular   Exercise tolerance: > or = 4 METS  (+) hypertension well controlled, CAD, CABG/stent, ,     ECG reviewed  Rhythm: regular  Rate: normal,      ROS comment:  Echo 2019-07-30 11:07.     Mild to moderate LV dilatation.     Moderate to severely decreased LV systolic function.    LVEF 30%.     Anterior/septal/apical MI with 1.5 X 2 cm lobular thrombus seen.     Normal RV size and function.     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     Mild MR, TR.     Compared to the prior study, there have been no major changes.      Thrombus appears the same size or slightly larger--imaging     differences may be responsible.     Took home metoprolol this morning.      Neuro/Psych    (+) seizures well controlled,   (-) no CVA    Comments: Right subdural hematoma, on Coumadin, INR yesterday 1.0    Hx of seizure, took home Keppra this morning    Endo/Other    (-) no diabetes, hypothyroidism     GI/Hepatic/Renal    (+)   chronic renal disease,     Comments: Cr 1.9     Other findings: Hbg 16.4  Plt 157,000  INR 1.0  Cr 1.9      Dental - normal exam                        Anesthesia Plan  Planned anesthetic: general endotracheal  GETA (hx of easy DL, ETT 7.5)  2 PIV  Arterial line  Pre-operative midazolam  Induction: etomidate, rocuronium  Maintenance: sevoflurane   Pain mgmt: fentanyl, acetaminophen  Anti-emetic mgmt: dexamethasone 10 mg, ondansetron 4 mg    ASA 3   Induction: intravenous   Anesthetic plan and risks discussed with: patient  Anesthesia plan special considerations: antiemetics, arterial catheterization, IV therapy two IVs,   Post-op plan: routine recovery           Adult

## 2023-09-12 NOTE — DISCHARGE NOTE NURSING/CASE MANAGEMENT/SOCIAL WORK - PATIENT PORTAL LINK FT
You can access the FollowMyHealth Patient Portal offered by [facility] by registering at the following website: [email or web address]. By joining [employer]’s FollowMyHealth portal, you will also be able to view your health information using other applications (apps) compatible with our system.

## 2023-09-12 NOTE — DISCHARGE NOTE NURSING/CASE MANAGEMENT/SOCIAL WORK - NSDCDMETYPESERV_GEN_ALL_CORE_FT
Hospital bed, transport wheel chair, hip kit NOTE: Commode not covered as pt has one from 2019.   Hospital bed, transport wheel chair, hip kit NOTE: Commode not covered as pt has one from 2019.  Rolling Walker

## 2023-09-13 ENCOUNTER — TRANSCRIPTION ENCOUNTER (OUTPATIENT)
Age: 88
End: 2023-09-13

## 2023-09-13 PROCEDURE — 99232 SBSQ HOSP IP/OBS MODERATE 35: CPT | Mod: GC

## 2023-09-13 RX ORDER — PANTOPRAZOLE SODIUM 20 MG/1
1 TABLET, DELAYED RELEASE ORAL
Qty: 14 | Refills: 0
Start: 2023-09-13 | End: 2023-09-26

## 2023-09-13 RX ORDER — LEVOTHYROXINE SODIUM 125 MCG
1 TABLET ORAL
Qty: 30 | Refills: 0
Start: 2023-09-13 | End: 2023-10-12

## 2023-09-13 RX ORDER — TRAMADOL HYDROCHLORIDE 50 MG/1
1 TABLET ORAL
Qty: 0 | Refills: 0 | DISCHARGE

## 2023-09-13 RX ORDER — GABAPENTIN 400 MG/1
1 CAPSULE ORAL
Refills: 0 | DISCHARGE

## 2023-09-13 RX ORDER — GABAPENTIN 400 MG/1
1 CAPSULE ORAL
Qty: 90 | Refills: 0
Start: 2023-09-13 | End: 2023-10-12

## 2023-09-13 RX ORDER — LOSARTAN POTASSIUM 100 MG/1
1 TABLET, FILM COATED ORAL
Refills: 0 | DISCHARGE

## 2023-09-13 RX ORDER — ATORVASTATIN CALCIUM 80 MG/1
1 TABLET, FILM COATED ORAL
Qty: 0 | Refills: 0 | DISCHARGE

## 2023-09-13 RX ORDER — ACETAMINOPHEN 500 MG
2 TABLET ORAL
Qty: 0 | Refills: 0 | DISCHARGE

## 2023-09-13 RX ORDER — ASPIRIN/CALCIUM CARB/MAGNESIUM 324 MG
1 TABLET ORAL
Qty: 28 | Refills: 0
Start: 2023-09-13 | End: 2023-09-26

## 2023-09-13 RX ORDER — ATORVASTATIN CALCIUM 80 MG/1
1 TABLET, FILM COATED ORAL
Qty: 30 | Refills: 0
Start: 2023-09-13 | End: 2023-10-12

## 2023-09-13 RX ORDER — METOPROLOL TARTRATE 50 MG
1 TABLET ORAL
Refills: 0 | DISCHARGE

## 2023-09-13 RX ORDER — METOPROLOL TARTRATE 50 MG
1 TABLET ORAL
Qty: 30 | Refills: 0
Start: 2023-09-13 | End: 2023-10-12

## 2023-09-13 RX ORDER — LEVOTHYROXINE SODIUM 125 MCG
1 TABLET ORAL
Qty: 0 | Refills: 0 | DISCHARGE

## 2023-09-13 RX ORDER — TRAMADOL HYDROCHLORIDE 50 MG/1
50 TABLET ORAL THREE TIMES A DAY
Refills: 0 | Status: DISCONTINUED | OUTPATIENT
Start: 2023-09-15 | End: 2023-09-14

## 2023-09-13 RX ADMIN — SENNA PLUS 2 TABLET(S): 8.6 TABLET ORAL at 21:16

## 2023-09-13 RX ADMIN — Medication 975 MILLIGRAM(S): at 14:11

## 2023-09-13 RX ADMIN — GABAPENTIN 300 MILLIGRAM(S): 400 CAPSULE ORAL at 05:23

## 2023-09-13 RX ADMIN — ENOXAPARIN SODIUM 40 MILLIGRAM(S): 100 INJECTION SUBCUTANEOUS at 05:23

## 2023-09-13 RX ADMIN — GABAPENTIN 300 MILLIGRAM(S): 400 CAPSULE ORAL at 21:16

## 2023-09-13 RX ADMIN — Medication 975 MILLIGRAM(S): at 05:28

## 2023-09-13 RX ADMIN — Medication 975 MILLIGRAM(S): at 05:23

## 2023-09-13 RX ADMIN — Medication 50 MILLIGRAM(S): at 05:25

## 2023-09-13 RX ADMIN — Medication 975 MILLIGRAM(S): at 21:17

## 2023-09-13 RX ADMIN — Medication 112 MICROGRAM(S): at 05:23

## 2023-09-13 RX ADMIN — Medication 975 MILLIGRAM(S): at 21:16

## 2023-09-13 RX ADMIN — Medication 400 UNIT(S): at 12:30

## 2023-09-13 RX ADMIN — GABAPENTIN 300 MILLIGRAM(S): 400 CAPSULE ORAL at 14:11

## 2023-09-13 RX ADMIN — ATORVASTATIN CALCIUM 10 MILLIGRAM(S): 80 TABLET, FILM COATED ORAL at 21:16

## 2023-09-13 NOTE — PROGRESS NOTE ADULT - SUBJECTIVE AND OBJECTIVE BOX
HPI:  89yo Female w/PMHx of HTN, hypothyroid/Graves, neuropathy, breast ca, BCC, community ambulator (walker) to ED HH on 8/26 from home s/p fall with severe left hip pain, inability to ambulate. XR hip shows left IT fracture. Ortho consulted. Underwent IM nail 8/27. Post op anemia- 1 unit PRBC. WBAT. Lovenox sq for DVT ppx. Leukocytosis w/up neg, UA neg. ARB held 2/2 hypotension. Evaluated by physical therapy/PMR and acute rehab recommended. Cleared for dc on 8/30.    CT Pelvis Bony Only No Cont (08.26.23 @ 19:57) >Acute impacted left femoral intertrochanteric fracture as described.       ROS/subjective:  Patient seen in chair, NAD  leg feels heavy  moving bowels daily- small amount  voiding well  Tylenol ATC, ICE , PRN Tramadol for pain   no dizziness, no headaches  no SOB, no chest pain  no nausea, no vomiting  using primafit at night  US LLE neg DVT- reports minimal Left LE pain but leg feels heavy      MEDICATIONS  (STANDING):  acetaminophen     Tablet .. 975 milliGRAM(s) Oral every 8 hours  atorvastatin 10 milliGRAM(s) Oral at bedtime  cholecalciferol 400 Unit(s) Oral daily  enoxaparin Injectable 40 milliGRAM(s) SubCutaneous every 24 hours  gabapentin 300 milliGRAM(s) Oral three times a day  levothyroxine 112 MICROGram(s) Oral daily  metoprolol succinate ER 50 milliGRAM(s) Oral daily  polyethylene glycol 3350 17 Gram(s) Oral daily  senna 2 Tablet(s) Oral at bedtime    MEDICATIONS  (PRN):  lidocaine 5% Ointment 1 Application(s) Topical three times a day PRN right foot neuroma pain  traMADol 50 milliGRAM(s) Oral three times a day PRN Severe Pain (7 - 10)        Vital Signs Last 24 Hrs  T(C): 37.2 (13 Sep 2023 08:36), Max: 37.2 (13 Sep 2023 08:36)  T(F): 98.9 (13 Sep 2023 08:36), Max: 98.9 (13 Sep 2023 08:36)  HR: 64 (13 Sep 2023 08:36) (64 - 67)  BP: 100/54 (13 Sep 2023 08:36) (100/54 - 114/67)  BP(mean): --  RR: 16 (13 Sep 2023 08:36) (16 - 17)  SpO2: 96% (13 Sep 2023 08:36) (96% - 99%)    Parameters below as of 13 Sep 2023 08:36  Patient On (Oxygen Delivery Method): room air      PHYSICAL EXAM  Constitutional - NAD, Comfortable  HEENT - NCAT, EOMI  Neck - Supple, No limited ROM  Chest - Breathing comfortably, No wheezing, clear lungs  Cardiovascular - S1S2   Abdomen - Soft, rounded, hyperactive  Extremities - No C/C/E, No calf tenderness  Left hip incision bandaged- Silverlon removed- staples Dcd -incision dry and intact  Neurologic Exam -                    Cognitive - AAO to self, place, date, year, situation     Communication - Fluent, No dysarthria     Cranial Nerves - CN 2-12 intact     Motor - No focal deficits                    LEFT    UE - ShAB 5/5, EF 5-/5, EE 5/5, WE 5/5,  5/5                    RIGHT UE - ShAB 5/5, EF 5/5, EE 5/5, WE 5/5,  5/5                    LEFT    LE - HF at least 2/5, KE 5/5, DF 5/5, PF 5/5                    RIGHT LE - HF 4/5, KE 5/5 DF 5/5, PF 5/5        Sensory - Intact to LT     Reflexes - DTR Intact     Coordination - FTN intact     Balance - impaired  Psychiatric - Mood stable, Affect WNL    DT meeting on 9/11    SW: Apt, ground level,     OT:  eating set up  grooming set up  UBD/LBD set up/min A  toileting CG  tub/shower min A/CG  bathing min A/CG    PT:  amb 80ft CG RW  transfers CG  stairs 6 CG    SLP na    tentative dc home c HC 9/14.      Continue comprehensive acute rehab program consisting of 3hrs/day of OT/PT.

## 2023-09-13 NOTE — DISCHARGE NOTE PROVIDER - NSDCMRMEDTOKEN_GEN_ALL_CORE_FT
acetaminophen 500 mg oral tablet: 2 orally every 8 hours as needed for  mild pain  atorvastatin 10 mg oral tablet: 1 tab(s) orally once a day (at bedtime)  cholecalciferol oral tablet: 400 unit(s) orally once a day  enoxaparin: 40 milligram(s) subcutaneous once a day last date sept 25th to complete 4 weeks for dvt prophylaxis.  gabapentin 300 mg oral capsule: 1 cap(s) orally 3 times a day  losartan 100 mg oral tablet: 1 tab(s) orally once a day  metoprolol succinate 100 mg oral tablet, extended release: 1 tab(s) orally once a day  polyethylene glycol 3350 oral powder for reconstitution: 17 gram(s) orally once a day  senna leaf extract oral tablet: 2 tab(s) orally once a day (at bedtime)  Synthroid 112 mcg (0.112 mg) oral tablet: 1 tab(s) orally once a day  traMADol 50 mg oral tablet: 1 orally every 6 hours as needed for moderate to severe pain   acetaminophen 500 mg oral tablet: 2 tablet orally every 8 hours as needed for  moderate pain  aspirin 81 mg oral tablet: 1 tab(s) orally 2 times a day  aspirin 81 mg oral tablet: 1 tab(s) orally 2 times a day  atorvastatin 10 mg oral tablet: 1 tab(s) orally once a day (at bedtime)  cholecalciferol oral tablet: 400 unit(s) orally once a day  gabapentin 300 mg oral capsule: 1 cap(s) orally 3 times a day  metoprolol succinate 50 mg oral tablet, extended release: 1 tab(s) orally once a day  Protonix 20 mg oral delayed release tablet: 1 tab(s) orally once a day  senna leaf extract oral tablet: 2 tab(s) orally once a day (at bedtime)  Synthroid 112 mcg (0.112 mg) oral tablet: 1 tab(s) orally once a day   acetaminophen 500 mg oral tablet: 2 tablet orally every 8 hours as needed for  moderate pain  aspirin 81 mg oral tablet: 1 tab(s) orally 2 times a day  atorvastatin 10 mg oral tablet: 1 tab(s) orally once a day (at bedtime)  cholecalciferol oral tablet: 400 unit(s) orally once a day  gabapentin 300 mg oral capsule: 1 cap(s) orally 3 times a day  metoprolol succinate 50 mg oral tablet, extended release: 1 tab(s) orally once a day  Protonix 20 mg oral delayed release tablet: 1 tab(s) orally once a day  senna leaf extract oral tablet: 2 tab(s) orally once a day (at bedtime)  Synthroid 112 mcg (0.112 mg) oral tablet: 1 tab(s) orally once a day

## 2023-09-13 NOTE — PROGRESS NOTE ADULT - NS ATTEND AMEND GEN_ALL_CORE FT
Rehab Attending- Patient seen and examined by me- Case discussed, above note reviewed by me with modifications made    patient seen and evaluated in PT  no issues overnight  left leg heavy- minimal pain  last BM 9/11 voiding well  family training tomorrow- family to obtain HHA on DC  to continue intensive rehab program      Vital Signs Last 24 Hrs  T(C): 36.7 (12 Sep 2023 08:15), Max: 36.7 (12 Sep 2023 08:15)  T(F): 98.1 (12 Sep 2023 08:15), Max: 98.1 (12 Sep 2023 08:15)  HR: 64 (12 Sep 2023 08:15) (61 - 64)  BP: 113/57 (12 Sep 2023 08:15) (113/57 - 120/79)  BP(mean): --  RR: 14 (12 Sep 2023 08:15) (14 - 14)  SpO2: 99% (12 Sep 2023 08:15) (99% - 99%)    Parameters below as of 12 Sep 2023 08:15  Patient On (Oxygen Delivery Method): room air        PHYSICAL EXAM  Constitutional - NAD, Comfortable  HEENT - NCAT, EOMI  Neck - Supple, No limited ROM  Chest - Breathing comfortably, No wheezing, clear lungs  Cardiovascular - S1S2   Abdomen - Soft, rounded, hyperactive  Extremities - No C/C/E, No calf tenderness  Left hip incision bandaged- Silverlon removed- staples Dcd -incision dry and intact  Neurologic Exam -                    Cognitive - AAO to self, place, date, year, situation     Communication - Fluent, No dysarthria     Cranial Nerves - CN 2-12 intact     Motor - No focal deficits                    LEFT    UE - ShAB 5/5, EF 5-/5, EE 5/5, WE 5/5,  5/5                    RIGHT UE - ShAB 5/5, EF 5/5, EE 5/5, WE 5/5,  5/5                    LEFT    LE - HF at least 2/5, KE 5/5, DF 5/5, PF 5/5                    RIGHT LE - HF 4/5, KE 5/5 DF 5/5, PF 5/5        Sensory - Intact to LT     Reflexes - DTR Intact     Coordination - FTN intact     Balance - impaired  Psychiatric - Mood stable, Affect WNL Rehab Attending- Patient seen and examined by me- Case discussed, above note reviewed by me with modifications made    patient seen and evaluated bedside  spoke with patient's son  no family training- VN on Dc-   hospital bed delivered- family to obtain HHA  left leg heavy- minimal pain  last BM 9/11 voiding well  BPs /HR still low- toprol decreased to 50mg daily  Anticipate Dc in AM  to continue intensive rehab program        Vital Signs Last 24 Hrs  T(C): 37.2 (13 Sep 2023 08:36), Max: 37.2 (13 Sep 2023 08:36)  T(F): 98.9 (13 Sep 2023 08:36), Max: 98.9 (13 Sep 2023 08:36)  HR: 64 (13 Sep 2023 08:36) (64 - 67)  BP: 100/54 (13 Sep 2023 08:36) (100/54 - 114/67)  BP(mean): --  RR: 16 (13 Sep 2023 08:36) (16 - 17)  SpO2: 96% (13 Sep 2023 08:36) (96% - 99%)    Parameters below as of 13 Sep 2023 08:36  Patient On (Oxygen Delivery Method): room air      PHYSICAL EXAM  Constitutional - NAD, Comfortable  HEENT - NCAT, EOMI  Neck - Supple, No limited ROM  Chest - Breathing comfortably, No wheezing, clear lungs  Cardiovascular - S1S2   Abdomen - Soft, rounded, hyperactive  Extremities - No C/C/E, No calf tenderness  Left hip incision  Silverlon removed- staples Dcd -incision dry and intact  Neurologic Exam -                    Cognitive - AAO to self, place, date, year, situation     Communication - Fluent, No dysarthria     Cranial Nerves - CN 2-12 intact     Motor - No focal deficits                    LEFT    UE - ShAB 5/5, EF 5-/5, EE 5/5, WE 5/5,  5/5                    RIGHT UE - ShAB 5/5, EF 5/5, EE 5/5, WE 5/5,  5/5                    LEFT    LE - HF at least 2/5, KE 5/5, DF 5/5, PF 5/5                    RIGHT LE - HF 4/5, KE 5/5 DF 5/5, PF 5/5        Sensory - Intact to LT     Reflexes - DTR Intact     Coordination - FTN intact     Balance - impaired  Psychiatric - Mood stable, Affect WNL

## 2023-09-13 NOTE — DISCHARGE NOTE PROVIDER - CARE PROVIDER_API CALL
Gurdeep Kramer [place]  Orthopaedic Surgery  [address] [place] [address], [place] [address]  Phone: [phone]  Fax: [phone]  Follow Up Time:     Samuel Corley  Physical/Rehab Medicine  [address] [place] [address]  Phone: [phone]  Fax: [phone]  Follow Up Time:

## 2023-09-13 NOTE — PROGRESS NOTE ADULT - ASSESSMENT
89yo Female s/p left IT fx s/p repair , now with decreased functional mobility, gait instability and ADL impairments.    COMORBIDITES/ACTIVE MEDICAL ISSUES     Gait Instability, ADL impairments and Functional impairments: Comprehensive Rehab Program of PT/OT     #Mechanical fall/left hip fracture:  -s/p im nail, WBAT  -pain control w/Tylenol q8h, ice and prn Tramadol  -incentive spirometry  -bowel regimen.  - fall precautions  - PT OT evaluations  - US LLE f/up edema-neg DVT  - soc service to contact patient's daughter regarding family training, HHA on DC.   -DVT ppx with ASA BID per ortho.    #Acute blood loss anemia:  -blood loss, s/p 1u PRBC post op  -Hgb stable    #Leukocytosis  -asymptomatic  -encourage PO fluids  no apparent source-? stress related- follow CBC    #Vit D insufficiency:  - supplement    #Hypothyroidism  -cont. Levothyroxine 112mcg    #Hypertension  -continue bb with hold parameters  -arb/ace on hold for borderline bp  -BPs borderline 100-110s on current dose of Toprol- 50mg now  -BPs [id number], HR 50-60s    #Hyperlipidemia  -continue statin.     #Peripheral Neuropathy  -cont. Gabapentin 300mg po tid    #Vte ppx  -lovenox    Pain Mgmt - Tylenol PRN  GI/Bowel Mgmt -  Senna,  Miralax   /Bladder Mgmt - Voiding independently, PVRx1      Precautions / PROPHYLAXIS:   - Falls, Cardiac  - Ortho: Weight bearing status: WBAT   - Lungs: Aspiration, Incentive Spirometer   - Pressure injury/Skin: Turn Q2hrs while in bed, OOB to Chair, PT/OT    - DVT: Lovenox- will contact ortho ? switch to ASA on DC, mssg sent to Dr Kramer on 9/11-awaiting response    PATIENT IS A GOOD CANDIDATE FOR [facility] BED 2/2 HER RECENT LEFT HIP FRACTURE . PATIENT REQUIRES FREQUENT POSITION CHANGES OR IMMEDIATE NEED FOR A CHANGE OF POSITION ,REQUIRES POSITIONING OF THE BODY TO ALLEVIATE PAIN, PREVENT CONTRACTURES AND AVOID RESPIRATORY INFECTIONS IN WAYS NOT FEASIBLE IN ORDINARY BED.

## 2023-09-13 NOTE — DISCHARGE NOTE PROVIDER - NSDCCPCAREPLAN_GEN_ALL_CORE_FT
PRINCIPAL DISCHARGE DIAGNOSIS  Diagnosis: H/O fracture of femur  Assessment and Plan of Treatment:       SECONDARY DISCHARGE DIAGNOSES  Diagnosis: HTN (hypertension)  Assessment and Plan of Treatment:      PRINCIPAL DISCHARGE DIAGNOSIS  Diagnosis: H/O fracture of femur  Assessment and Plan of Treatment: continue therapy, follow up with ortho in 1 week. ASA 81mg every 12h x 2 weeks with GI prophylaxis-Protonix. Pain regimen with Tylenol as needed. Use ice, elevate Left extremity as able.      SECONDARY DISCHARGE DIAGNOSES  Diagnosis: HTN (hypertension)  Assessment and Plan of Treatment: BP regimen decreased for BPs 100s and HR 50s. Follow up PCP

## 2023-09-14 VITALS
OXYGEN SATURATION: 94 % | TEMPERATURE: 98 F | SYSTOLIC BLOOD PRESSURE: 117 MMHG | DIASTOLIC BLOOD PRESSURE: 56 MMHG | RESPIRATION RATE: 16 BRPM | HEART RATE: 70 BPM

## 2023-09-14 LAB
ALBUMIN SERPL ELPH-MCNC: 3.5 G/DL — SIGNIFICANT CHANGE UP (ref 3.3–5)
ALP SERPL-CCNC: 115 U/L — SIGNIFICANT CHANGE UP (ref 40–120)
ALT FLD-CCNC: 23 U/L — SIGNIFICANT CHANGE UP (ref 10–45)
ANION GAP SERPL CALC-SCNC: 6 MMOL/L — SIGNIFICANT CHANGE UP (ref 5–17)
AST SERPL-CCNC: 22 U/L — SIGNIFICANT CHANGE UP (ref 10–40)
BILIRUB SERPL-MCNC: 0.7 MG/DL — SIGNIFICANT CHANGE UP (ref 0.2–1.2)
BUN SERPL-MCNC: 23 MG/DL — SIGNIFICANT CHANGE UP (ref 7–23)
CALCIUM SERPL-MCNC: 9.5 MG/DL — SIGNIFICANT CHANGE UP (ref 8.4–10.5)
CHLORIDE SERPL-SCNC: 100 MMOL/L — SIGNIFICANT CHANGE UP (ref 96–108)
CO2 SERPL-SCNC: 30 MMOL/L — SIGNIFICANT CHANGE UP (ref 22–31)
CREAT SERPL-MCNC: 0.95 MG/DL — SIGNIFICANT CHANGE UP (ref 0.5–1.3)
EGFR: 56 ML/MIN/1.73M2 — LOW
GLUCOSE SERPL-MCNC: 102 MG/DL — HIGH (ref 70–99)
HCT VFR BLD CALC: 34.7 % — SIGNIFICANT CHANGE UP (ref 34.5–45)
HGB BLD-MCNC: 10.9 G/DL — LOW (ref 11.5–15.5)
MCHC RBC-ENTMCNC: 31.3 PG — SIGNIFICANT CHANGE UP (ref 27–34)
MCHC RBC-ENTMCNC: 31.4 GM/DL — LOW (ref 32–36)
MCV RBC AUTO: 99.7 FL — SIGNIFICANT CHANGE UP (ref 80–100)
NRBC # BLD: 0 /100 WBCS — SIGNIFICANT CHANGE UP (ref 0–0)
PLATELET # BLD AUTO: 317 K/UL — SIGNIFICANT CHANGE UP (ref 150–400)
POTASSIUM SERPL-MCNC: 4.3 MMOL/L — SIGNIFICANT CHANGE UP (ref 3.5–5.3)
POTASSIUM SERPL-SCNC: 4.3 MMOL/L — SIGNIFICANT CHANGE UP (ref 3.5–5.3)
PROT SERPL-MCNC: 7.7 G/DL — SIGNIFICANT CHANGE UP (ref 6–8.3)
RBC # BLD: 3.48 M/UL — LOW (ref 3.8–5.2)
RBC # FLD: 16.8 % — HIGH (ref 10.3–14.5)
SODIUM SERPL-SCNC: 136 MMOL/L — SIGNIFICANT CHANGE UP (ref 135–145)
WBC # BLD: 8.87 K/UL — SIGNIFICANT CHANGE UP (ref 3.8–10.5)
WBC # FLD AUTO: 8.87 K/UL — SIGNIFICANT CHANGE UP (ref 3.8–10.5)

## 2023-09-14 PROCEDURE — 99238 HOSP IP/OBS DSCHRG MGMT 30/<: CPT | Mod: GC

## 2023-09-14 RX ADMIN — Medication 50 MILLIGRAM(S): at 06:05

## 2023-09-14 RX ADMIN — Medication 975 MILLIGRAM(S): at 15:03

## 2023-09-14 RX ADMIN — GABAPENTIN 300 MILLIGRAM(S): 400 CAPSULE ORAL at 06:06

## 2023-09-14 RX ADMIN — GABAPENTIN 300 MILLIGRAM(S): 400 CAPSULE ORAL at 15:03

## 2023-09-14 RX ADMIN — Medication 975 MILLIGRAM(S): at 06:05

## 2023-09-14 RX ADMIN — Medication 975 MILLIGRAM(S): at 06:06

## 2023-09-14 RX ADMIN — Medication 112 MICROGRAM(S): at 06:06

## 2023-09-14 RX ADMIN — ENOXAPARIN SODIUM 40 MILLIGRAM(S): 100 INJECTION SUBCUTANEOUS at 06:06

## 2023-09-14 NOTE — PROGRESS NOTE ADULT - SUBJECTIVE AND OBJECTIVE BOX
HPI:  89yo Female w/PMHx of HTN, hypothyroid/Graves, neuropathy, breast ca, BCC, community ambulator (walker) to ED HH on 8/26 from home s/p fall with severe left hip pain, inability to ambulate. XR hip shows left IT fracture. Ortho consulted. Underwent IM nail 8/27. Post op anemia- 1 unit PRBC. WBAT. Lovenox sq for DVT ppx. Leukocytosis w/up neg, UA neg. ARB held 2/2 hypotension. Evaluated by physical therapy/PMR and acute rehab recommended. Cleared for dc on 8/30.    CT Pelvis Bony Only No Cont (08.26.23 @ 19:57) >Acute impacted left femoral intertrochanteric fracture as described.         ROS/subjective:  Patient seen in chair, NAD  leg feels heavy  moving bowels daily- small amount  voiding well  no dizziness, no headaches  no SOB, no chest pain  no nausea, no vomiting  US LLE neg DVT- reports minimal Left LE pain but leg feels heavy        MEDICATIONS  (STANDING):  acetaminophen     Tablet .. 975 milliGRAM(s) Oral every 8 hours  atorvastatin 10 milliGRAM(s) Oral at bedtime  cholecalciferol 400 Unit(s) Oral daily  enoxaparin Injectable 40 milliGRAM(s) SubCutaneous every 24 hours  gabapentin 300 milliGRAM(s) Oral three times a day  levothyroxine 112 MICROGram(s) Oral daily  metoprolol succinate ER 50 milliGRAM(s) Oral daily  polyethylene glycol 3350 17 Gram(s) Oral daily  senna 2 Tablet(s) Oral at bedtime    MEDICATIONS  (PRN):  lidocaine 5% Ointment 1 Application(s) Topical three times a day PRN right foot neuroma pain  traMADol 50 milliGRAM(s) Oral three times a day PRN Severe Pain (7 - 10)                            10.9   8.87  )-----------( 317      ( 14 Sep 2023 07:22 )             34.7     09-14    136  |  100  |  23  ----------------------------<  102<H>  4.3   |  30  |  0.95    Ca    9.5      14 Sep 2023 07:22    TPro  7.7  /  Alb  3.5  /  TBili  0.7  /  DBili  x   /  AST  22  /  ALT  23  /  AlkPhos  115  09-14    Urinalysis Basic - ( 14 Sep 2023 07:22 )    Color: x / Appearance: x / SG: x / pH: x  Gluc: 102 mg/dL / Ketone: x  / Bili: x / Urobili: x   Blood: x / Protein: x / Nitrite: x   Leuk Esterase: x / RBC: x / WBC x   Sq Epi: x / Non Sq Epi: x / Bacteria: x        Vital Signs Last 24 Hrs  T(C): 36.9 (14 Sep 2023 08:53), Max: 36.9 (14 Sep 2023 08:53)  T(F): 98.5 (14 Sep 2023 08:53), Max: 98.5 (14 Sep 2023 08:53)  HR: 70 (14 Sep 2023 08:53) (70 - 77)  BP: 117/56 (14 Sep 2023 08:53) (115/60 - 127/76)  BP(mean): --  RR: 16 (14 Sep 2023 08:53) (16 - 18)  SpO2: 94% (14 Sep 2023 08:53) (94% - 98%)    Parameters below as of 14 Sep 2023 08:53  Patient On (Oxygen Delivery Method): room air      PHYSICAL EXAM  Constitutional - NAD, Comfortable  HEENT - NCAT, EOMI  Neck - Supple, No limited ROM  Chest - Breathing comfortably, No wheezing, clear lungs  Cardiovascular - S1S2   Abdomen - Soft, rounded, hyperactive  Extremities - No C/C/E, No calf tenderness  Left hip incision bandaged- Silverlon removed- staples Dcd -incision dry and intact  Neurologic Exam -                    Cognitive - AAO to self, place, date, year, situation     Communication - Fluent, No dysarthria     Cranial Nerves - CN 2-12 intact     Motor - No focal deficits                    LEFT    UE - ShAB 5/5, EF 5-/5, EE 5/5, WE 5/5,  5/5                    RIGHT UE - ShAB 5/5, EF 5/5, EE 5/5, WE 5/5,  5/5                    LEFT    LE - HF at least 2/5, KE 5/5, DF 5/5, PF 5/5                    RIGHT LE - HF 4/5, KE 5/5 DF 5/5, PF 5/5        Sensory - Intact to LT     Reflexes - DTR Intact     Coordination - FTN intact     Balance - impaired  Psychiatric - Mood stable, Affect WNL    DT meeting on 9/11    SW: Apt, ground level,     OT:  eating set up  grooming set up  UBD/LBD set up/min A  toileting CG  tub/shower min A/CG  bathing min A/CG    PT:  amb 80ft CG RW  transfers CG  stairs 6 CG    SLP na    dc home c HC 9/14.    Completed comprehensive acute rehab program consisting of 3hrs/day of OT/PT. HPI:  89yo Female w/PMHx of HTN, hypothyroid/Graves, neuropathy, breast ca, BCC, community ambulator (walker) to ED HH on 8/26 from home s/p fall with severe left hip pain, inability to ambulate. XR hip shows left IT fracture. Ortho consulted. Underwent IM nail 8/27. Post op anemia- 1 unit PRBC. WBAT. Lovenox sq for DVT ppx. Leukocytosis w/up neg, UA neg. ARB held 2/2 hypotension. Evaluated by physical therapy/PMR and acute rehab recommended. Cleared for dc on 8/30.    CT Pelvis Bony Only No Cont (08.26.23 @ 19:57) >Acute impacted left femoral intertrochanteric fracture as described.         ROS/subjective:  Patient seen in chair, NAD  leg feels heavy  moving bowels daily- small amount  voiding well  no dizziness, no headaches  no SOB, no chest pain  no nausea, no vomiting  US LLE neg DVT- reports minimal Left LE pain but leg feels heavy        MEDICATIONS  (STANDING):  acetaminophen     Tablet .. 975 milliGRAM(s) Oral every 8 hours  atorvastatin 10 milliGRAM(s) Oral at bedtime  cholecalciferol 400 Unit(s) Oral daily  enoxaparin Injectable 40 milliGRAM(s) SubCutaneous every 24 hours  gabapentin 300 milliGRAM(s) Oral three times a day  levothyroxine 112 MICROGram(s) Oral daily  metoprolol succinate ER 50 milliGRAM(s) Oral daily  polyethylene glycol 3350 17 Gram(s) Oral daily  senna 2 Tablet(s) Oral at bedtime    MEDICATIONS  (PRN):  lidocaine 5% Ointment 1 Application(s) Topical three times a day PRN right foot neuroma pain  traMADol 50 milliGRAM(s) Oral three times a day PRN Severe Pain (7 - 10)                            10.9   8.87  )-----------( 317      ( 14 Sep 2023 07:22 )             34.7     09-14    136  |  100  |  23  ----------------------------<  102<H>  4.3   |  30  |  0.95    Ca    9.5      14 Sep 2023 07:22    TPro  7.7  /  Alb  3.5  /  TBili  0.7  /  DBili  x   /  AST  22  /  ALT  23  /  AlkPhos  115  09-14    Urinalysis Basic - ( 14 Sep 2023 07:22 )    Color: x / Appearance: x / SG: x / pH: x  Gluc: 102 mg/dL / Ketone: x  / Bili: x / Urobili: x   Blood: x / Protein: x / Nitrite: x   Leuk Esterase: x / RBC: x / WBC x   Sq Epi: x / Non Sq Epi: x / Bacteria: x        Vital Signs Last 24 Hrs  T(C): 36.9 (14 Sep 2023 08:53), Max: 36.9 (14 Sep 2023 08:53)  T(F): 98.5 (14 Sep 2023 08:53), Max: 98.5 (14 Sep 2023 08:53)  HR: 70 (14 Sep 2023 08:53) (70 - 77)  BP: 117/56 (14 Sep 2023 08:53) (115/60 - 127/76)  BP(mean): --  RR: 16 (14 Sep 2023 08:53) (16 - 18)  SpO2: 94% (14 Sep 2023 08:53) (94% - 98%)    Parameters below as of 14 Sep 2023 08:53  Patient On (Oxygen Delivery Method): room air      PHYSICAL EXAM  Constitutional - NAD, Comfortable  HEENT - NCAT, EOMI  Neck - Supple, No limited ROM  Chest - Breathing comfortably, No wheezing, clear lungs  Cardiovascular - S1S2   Abdomen - Soft, rounded, hyperactive  Extremities - No C/C/E, No calf tenderness  Left hip incision bandaged- Silverlon removed- staples Dcd -incision dry and intact  Neurologic Exam -                    Cognitive - AAO to self, place, date, year, situation     Communication - Fluent, No dysarthria     Cranial Nerves - CN 2-12 intact     Motor - No focal deficits                    LEFT    UE - ShAB 5/5, EF 5-/5, EE 5/5, WE 5/5,  5/5                    RIGHT UE - ShAB 5/5, EF 5/5, EE 5/5, WE 5/5,  5/5                    LEFT    LE - HF  3+/5, KE 5/5, DF 5/5, PF 5/5                    RIGHT LE - HF 4/5, KE 5/5 DF 5/5, PF 5/5        Sensory - Intact to LT     Reflexes - DTR Intact     Coordination - FTN intact     Balance - impaired  Psychiatric - Mood stable, Affect WNL    DT meeting on 9/11    SW: Apt, ground level,     OT:  eating set up  grooming set up  UBD/LBD set up/min A  toileting CG  tub/shower min A/CG  bathing min A/CG    PT:  amb 80ft CG RW  transfers CG  stairs 6 CG    SLP na    dc home c HC 9/14.    Completed comprehensive acute rehab program consisting of 3hrs/day of OT/PT.

## 2023-09-14 NOTE — PROGRESS NOTE ADULT - NS ATTEND AMEND GEN_ALL_CORE FT
Rehab Attending- Patient seen and examined by me- Case discussed, above note reviewed by me with modifications made    patient seen and evaluated bedside  spoke with patient's son  no family training- VN on Dc-   hospital bed delivered- family to obtain HHA  left leg heavy- minimal pain  last BM 9/11 voiding well  BPs /HR still low- toprol decreased to 50mg daily  Anticipate Dc in AM  to continue intensive rehab program        Vital Signs Last 24 Hrs  T(C): 37.2 (13 Sep 2023 08:36), Max: 37.2 (13 Sep 2023 08:36)  T(F): 98.9 (13 Sep 2023 08:36), Max: 98.9 (13 Sep 2023 08:36)  HR: 64 (13 Sep 2023 08:36) (64 - 67)  BP: 100/54 (13 Sep 2023 08:36) (100/54 - 114/67)  BP(mean): --  RR: 16 (13 Sep 2023 08:36) (16 - 17)  SpO2: 96% (13 Sep 2023 08:36) (96% - 99%)    Parameters below as of 13 Sep 2023 08:36  Patient On (Oxygen Delivery Method): room air      PHYSICAL EXAM  Constitutional - NAD, Comfortable  HEENT - NCAT, EOMI  Neck - Supple, No limited ROM  Chest - Breathing comfortably, No wheezing, clear lungs  Cardiovascular - S1S2   Abdomen - Soft, rounded, hyperactive  Extremities - No C/C/E, No calf tenderness  Left hip incision  Silverlon removed- staples Dcd -incision dry and intact  Neurologic Exam -                    Cognitive - AAO to self, place, date, year, situation     Communication - Fluent, No dysarthria     Cranial Nerves - CN 2-12 intact     Motor - No focal deficits                    LEFT    UE - ShAB 5/5, EF 5-/5, EE 5/5, WE 5/5,  5/5                    RIGHT UE - ShAB 5/5, EF 5/5, EE 5/5, WE 5/5,  5/5                    LEFT    LE - HF at least 2/5, KE 5/5, DF 5/5, PF 5/5                    RIGHT LE - HF 4/5, KE 5/5 DF 5/5, PF 5/5        Sensory - Intact to LT     Reflexes - DTR Intact     Coordination - FTN intact     Balance - impaired  Psychiatric - Mood stable, Affect WNL Rehab Attending- Patient seen and examined by me- Case discussed, above note reviewed by me with modifications made    patient seen and evaluated bedside  DC to home with VN services  hospital bed delivered- family to obtain HHA  left leg heavy- minimal pain  last BM 9/12 voiding well  BPs better on lower dose Toprol  FU Ortho Dr adams- 2 weeks  FU PMD 7-10 days  FU PMR Dr Corley 4-6 weeeks  Continue baby ASA 2x/day for minimum 2 weeks- ortho to decide on DC      Vital Signs Last 24 Hrs  T(C): 36.9 (14 Sep 2023 08:53), Max: 36.9 (14 Sep 2023 08:53)  T(F): 98.5 (14 Sep 2023 08:53), Max: 98.5 (14 Sep 2023 08:53)  HR: 70 (14 Sep 2023 08:53) (70 - 77)  BP: 117/56 (14 Sep 2023 08:53) (115/60 - 127/76)  BP(mean): --  RR: 16 (14 Sep 2023 08:53) (16 - 18)  SpO2: 94% (14 Sep 2023 08:53) (94% - 98%)    Parameters below as of 14 Sep 2023 08:53  Patient On (Oxygen Delivery Method): room air      PHYSICAL EXAM  Constitutional - NAD, Comfortable  HEENT - NCAT, EOMI  Neck - Supple, No limited ROM  Chest - Breathing comfortably, No wheezing, clear lungs  Cardiovascular - S1S2   Abdomen - Soft, rounded, hyperactive  Extremities - No C/C/E, No calf tenderness  Left hip incision  Silverlon removed- staples Dcd -incision dry and intact  Neurologic Exam -                    Cognitive - AAO to self, place, date, year, situation     Communication - Fluent, No dysarthria     Cranial Nerves - CN 2-12 intact     Motor - No focal deficits                    LEFT    UE - ShAB 5/5, EF 5-/5, EE 5/5, WE 5/5,  5/5                    RIGHT UE - ShAB 5/5, EF 5/5, EE 5/5, WE 5/5,  5/5                    LEFT    LE - HF at least 3+/5, KE 5/5, DF 5/5, PF 5/5                    RIGHT LE - HF 4/5, KE 5/5 DF 5/5, PF 5/5        Sensory - Intact to LT     Reflexes - DTR Intact     Coordination - FTN intact     Balance - impaired  Psychiatric - Mood stable, Affect WNL

## 2023-09-14 NOTE — PROGRESS NOTE ADULT - REASON FOR ADMISSION
s/p left IT fx s/p repair

## 2023-09-14 NOTE — PROGRESS NOTE ADULT - PROVIDER SPECIALTY LIST ADULT
Hospitalist
Hospitalist
Physiatry
Physiatry
Hospitalist
Hospitalist
Physiatry
Physiatry
Hospitalist
Hospitalist
Physiatry
Rehab Medicine
Physiatry

## 2023-09-14 NOTE — PROGRESS NOTE ADULT - ASSESSMENT
89yo Female s/p left IT fx s/p repair , now with decreased functional mobility, gait instability and ADL impairments.    COMORBIDITES/ACTIVE MEDICAL ISSUES     Gait Instability, ADL impairments and Functional impairments: Comprehensive Rehab Program of PT/OT     #Mechanical fall/left hip fracture:  -s/p im nail, WBAT  -pain control w/Tylenol q8h, ice and prn Tramadol  -incentive spirometry  -bowel regimen.  - fall precautions  - PT OT evaluations  - US LLE f/up edema-neg DVT  - DVT ppx with ASA BID per ortho.    #Acute blood loss anemia:  -blood loss, s/p 1u PRBC post op  -Hgb stable    #Leukocytosis  -asymptomatic  -encourage PO fluids    #Vit D insufficiency:  - supplement    #Hypothyroidism  -cont. Levothyroxine 112mcg at home-PCP in community    #Hypertension  -continue bb with hold parameters  -arb/ace on hold for borderline bp  -BPs borderline 100-110s on current dose of Toprol- 50mg now, PCP follow up    #Hyperlipidemia  -continue statin.     #Peripheral Neuropathy  -cont. Gabapentin 300mg po tid    Pain Mgmt - Tylenol PRN  GI/Bowel Mgmt -  Senna,  Miralax   /Bladder Mgmt - Voiding independently, PVRx1    PATIENT IS A GOOD CANDIDATE FOR [facility] 2/2 HER RECENT LEFT HIP FRACTURE . PATIENT REQUIRES FREQUENT POSITION CHANGES OR IMMEDIATE NEED FOR A CHANGE OF POSITION ,REQUIRES POSITIONING OF THE BODY TO ALLEVIATE PAIN, PREVENT CONTRACTURES AND AVOID RESPIRATORY INFECTIONS IN WAYS NOT FEASIBLE IN ORDINARY BED.

## 2023-09-27 DIAGNOSIS — Z98.890 OTHER SPECIFIED POSTPROCEDURAL STATES: ICD-10-CM

## 2023-09-27 DIAGNOSIS — Z87.81 OTHER SPECIFIED POSTPROCEDURAL STATES: ICD-10-CM

## 2023-09-27 NOTE — ED ADULT NURSE NOTE - EXTENSIONS OF SELF_ADULT
What Is The Reason For Today's Visit?: History of Melanoma Breslow Depth?: 0.5 mm None Year Excised?: 2020

## 2023-10-09 ENCOUNTER — APPOINTMENT (OUTPATIENT)
Dept: ORTHOPEDIC SURGERY | Facility: CLINIC | Age: 88
End: 2023-10-09
Payer: MEDICARE

## 2023-10-09 VITALS — HEIGHT: 63 IN | WEIGHT: 170 LBS | BODY MASS INDEX: 30.12 KG/M2

## 2023-10-09 PROCEDURE — 73502 X-RAY EXAM HIP UNI 2-3 VIEWS: CPT

## 2023-10-09 PROCEDURE — 99024 POSTOP FOLLOW-UP VISIT: CPT

## 2023-10-18 ENCOUNTER — APPOINTMENT (OUTPATIENT)
Dept: PHYSICAL MEDICINE AND REHAB | Facility: CLINIC | Age: 88
End: 2023-10-18

## 2023-11-02 PROCEDURE — 80053 COMPREHEN METABOLIC PANEL: CPT

## 2023-11-02 PROCEDURE — 97112 NEUROMUSCULAR REEDUCATION: CPT

## 2023-11-02 PROCEDURE — 97116 GAIT TRAINING THERAPY: CPT

## 2023-11-02 PROCEDURE — 97530 THERAPEUTIC ACTIVITIES: CPT

## 2023-11-02 PROCEDURE — 85025 COMPLETE CBC W/AUTO DIFF WBC: CPT

## 2023-11-02 PROCEDURE — 97167 OT EVAL HIGH COMPLEX 60 MIN: CPT

## 2023-11-02 PROCEDURE — 97535 SELF CARE MNGMENT TRAINING: CPT

## 2023-11-02 PROCEDURE — 85027 COMPLETE CBC AUTOMATED: CPT

## 2023-11-02 PROCEDURE — 93971 EXTREMITY STUDY: CPT

## 2023-11-02 PROCEDURE — 36415 COLL VENOUS BLD VENIPUNCTURE: CPT

## 2023-11-02 PROCEDURE — 97110 THERAPEUTIC EXERCISES: CPT

## 2023-11-02 PROCEDURE — 97163 PT EVAL HIGH COMPLEX 45 MIN: CPT

## 2023-11-12 NOTE — DISCHARGE NOTE PROVIDER - HOSPITAL COURSE
Parent 89yo Female w/PMHx of HTN, hypothyroid/Graves, neuropathy, breast ca, BCC, community ambulator (walker) to ED HH on 8/26 from home s/p fall with severe left hip pain, inability to ambulate. XR hip shows left IT fracture. Ortho consulted. Underwent IM nail 8/27. Post op anemia- 1 unit PRBC. WBAT. Lovenox sq for DVT ppx. Leukocytosis w/up neg, UA neg. ARB held 2/2 hypotension. Evaluated by physical therapy/PMR and acute rehab recommended. Cleared for dc on 8/30.     89yo Female w/PMHx of HTN, hypothyroid/Graves, neuropathy, breast ca, BCC, community ambulator (walker) to ED HH on 8/26 from home s/p fall with severe left hip pain, inability to ambulate. XR hip shows left IT fracture. Ortho consulted. Underwent IM nail 8/27. Post op anemia- 1 unit PRBC. WBAT. Lovenox sq for DVT ppx. Leukocytosis w/up neg, UA neg. ARB held 2/2 hypotension. Evaluated by physical therapy/PMR and acute rehab recommended. Cleared for dc on 8/30.  At [facility] rehab patient completed comprehensive PT OT program and reached her rehab goals on 9/14. While at rehab patient evaluated by medicine. Bowel and pain regimen adjusted. Wound healing well, staples removed. Toprol decreased for BPs 100s. and HR 50s. Cleared for dc home w/HC on 9/14.

## 2023-12-11 ENCOUNTER — APPOINTMENT (OUTPATIENT)
Dept: ORTHOPEDIC SURGERY | Facility: CLINIC | Age: 88
End: 2023-12-11
Payer: MEDICARE

## 2023-12-11 VITALS — BODY MASS INDEX: 29.23 KG/M2 | HEIGHT: 63 IN | WEIGHT: 165 LBS

## 2023-12-11 PROCEDURE — 73503 X-RAY EXAM HIP UNI 4/> VIEWS: CPT | Mod: LT

## 2023-12-11 PROCEDURE — 99213 OFFICE O/P EST LOW 20 MIN: CPT

## 2023-12-12 NOTE — HISTORY OF PRESENT ILLNESS
[] : no [FreeTextEntry1] : Lt. Hip [FreeTextEntry5] : pt here for PO#2 states recovery is ok but still in pain. currently walking with a walker. PT 2x a week  DOS 08/27/23 [FreeTextEntry7] : Lt. shivamin [de-identified] : 8/27/23 [de-identified] : Dr. Kramer [de-identified] : 8/27/23 [de-identified] : PT 2x weekly [de-identified] : 08/27/23 [de-identified] : hip replacement

## 2023-12-12 NOTE — ASSESSMENT
[FreeTextEntry1] : The patient comes in today for follow-up on her left hip.  She is now 3 and half months out from her open reduction internal fixation of a left intertrochanteric hip fracture.  She still has tightness in the hip and groin pain.  She uses the walker to get around.  She takes ibuprofen which definitely is helpful.  The pain has not gotten better recently.  Examination of the left lower extremity reveals normal neurovascular exam and equal leg lengths.  She has slight limitation of range of motion with pain at the extremes.  Her scar is well-healed there is no point tenderness.  There is no redness or rashes.  X-rays done in the office today of the left hip 2 views and the AP pelvis shows the fracture to be healing in acceptable position.  There is only a small amount of callus formation around the area.  The right hip is normal.  Plan at this point is activity modification.  Will see her back in the office in February and get x-rays 2 views of the left hip and the AP pelvis 1 view.  Will decide on whether a CAT scan is warranted based off of her pain.

## 2024-02-21 ENCOUNTER — APPOINTMENT (OUTPATIENT)
Dept: ORTHOPEDIC SURGERY | Facility: CLINIC | Age: 89
End: 2024-02-21
Payer: MEDICARE

## 2024-02-21 VITALS — WEIGHT: 165 LBS | HEIGHT: 63 IN | BODY MASS INDEX: 29.23 KG/M2

## 2024-02-21 DIAGNOSIS — S72.142A DISPLACED INTERTROCHANTERIC FRACTURE OF LEFT FEMUR, INITIAL ENCOUNTER FOR CLOSED FRACTURE: ICD-10-CM

## 2024-02-21 PROCEDURE — 99213 OFFICE O/P EST LOW 20 MIN: CPT

## 2024-02-21 PROCEDURE — 73503 X-RAY EXAM HIP UNI 4/> VIEWS: CPT | Mod: LT

## 2024-02-23 PROBLEM — S72.142A CLOSED DISPLACED INTERTROCHANTERIC FRACTURE OF LEFT FEMUR, INITIAL ENCOUNTER: Status: ACTIVE | Noted: 2023-10-09

## 2024-02-23 NOTE — HISTORY OF PRESENT ILLNESS
[FreeTextEntry1] : Lt. Hip [] : no [FreeTextEntry5] : pt here for PO#3 states recovery is going well  with minimal on and off pain. currently walking with a walker. Finished PT but wants to restart.   DOS 08/27/23 [FreeTextEntry7] : Lt. shivamin [de-identified] : 8/27/23 [de-identified] : Dr. Kramer [de-identified] : 8/27/23 [de-identified] : hip replacement [de-identified] : 08/27/23

## 2024-02-23 NOTE — ASSESSMENT
[FreeTextEntry1] : The patient comes in today for follow-up on her left hip.  She is now 6 months out from her open reduction and internal fixation of a left intertrochanteric hip fracture.  Overall she is doing well.  She is ambulating with a walker.  Prior to surgery she was ambulating with a cane.  She now mostly has stiffness in the morning.  Her walking is improving.  She still has some mild groin pain.  She denies night pain.  She has pain also over the hardware laterally.  She has no problems putting on her shoes and socks.  Examination left lower extremity reveals normal neurovascular exam.  Examination left hip reveals well-healed scar.  She has slight decreased range of motion in her left leg is slightly shorter than the right by approximately quarter of an inch.  She has tenderness over the greater trochanter.  There is no obvious weakness of hip flexion or abduction.  There is no redness or rashes.  X-rays done in the office today the AP pelvis 1 view of the left hip 2 views shows a healed intertrochanteric hip fracture on the left side with no signs of nonunion.  There is minimal collapse.  There is slight prominence of the lateral portion of the proximal interlocking screw.  The right hip is normal with no arthritis.  There were no obvious tumors or masses seen.  Plan at this time is physical therapy for the left hip.  Will see her back in the office as needed.  Will consider a cortisone injection over the greater trochanter if her lateral pain persists and is unbearable.

## 2024-04-10 ENCOUNTER — APPOINTMENT (OUTPATIENT)
Dept: ORTHOPEDIC SURGERY | Facility: CLINIC | Age: 89
End: 2024-04-10

## 2024-05-16 NOTE — DISCHARGE NOTE PROVIDER - EXTENDED VTE YES NO FOR MLM ENOXAPARIN
Patient presents for weekly paracentesis. Familiar with unit. Tolerated procedure well VSS throughout. 1650cc of red/karma fluid removed. Specimen sent off for testing. Patient ok to d/c home    ,

## 2024-08-12 ENCOUNTER — APPOINTMENT (OUTPATIENT)
Dept: ORTHOPEDIC SURGERY | Facility: CLINIC | Age: 89
End: 2024-08-12
Payer: MEDICARE

## 2024-08-12 VITALS — BODY MASS INDEX: 28.35 KG/M2 | WEIGHT: 160 LBS | HEIGHT: 63 IN

## 2024-08-12 DIAGNOSIS — Z87.81 OTHER SPECIFIED POSTPROCEDURAL STATES: ICD-10-CM

## 2024-08-12 DIAGNOSIS — S72.142D DISPLACED INTERTROCHANTERIC FRACTURE OF LEFT FEMUR, SUBSEQUENT ENCOUNTER FOR CLOSED FRACTURE WITH ROUTINE HEALING: ICD-10-CM

## 2024-08-12 DIAGNOSIS — Z98.890 OTHER SPECIFIED POSTPROCEDURAL STATES: ICD-10-CM

## 2024-08-12 PROCEDURE — 73502 X-RAY EXAM HIP UNI 2-3 VIEWS: CPT

## 2024-08-12 PROCEDURE — 99213 OFFICE O/P EST LOW 20 MIN: CPT

## 2024-08-14 PROBLEM — S72.142D CLOSED DISPLACED INTERTROCHANTERIC FRACTURE OF LEFT FEMUR WITH ROUTINE HEALING, SUBSEQUENT ENCOUNTER: Status: ACTIVE | Noted: 2024-08-14

## 2024-08-14 NOTE — HISTORY OF PRESENT ILLNESS
[4] : 4 [2] : 2 [Dull/Aching] : dull/aching [Intermittent] : intermittent [Rest] : rest [Meds] : meds [Walking] : walking [] : yes [FreeTextEntry5] : 89 y/o F presents for f/u eval of her Lt. hip today. Pt reports of increased pain especially in her groin and down her Lt. leg. Numbness when walking. [FreeTextEntry7] : Groin and down leg

## 2024-08-14 NOTE — HISTORY OF PRESENT ILLNESS
[4] : 4 [2] : 2 [Dull/Aching] : dull/aching [Intermittent] : intermittent [Rest] : rest [Meds] : meds [Walking] : walking [] : yes [FreeTextEntry5] : 89 y/o F presents for f/u eval of her Lt. hip today. Pt reports of increased pain especially in her groin and down her Lt. leg. Numbness when walking. [FreeTextEntry7] : Groin and down leg

## 2024-08-14 NOTE — ASSESSMENT
[FreeTextEntry1] : The patient comes in today for follow-up on her left hip.  The patient is approx a year from her open reduction and internal fixation of a left intertrochanteric hip fracture on 8/27/23.  Overall she is doing well.  She is ambulating with a walker.  Prior to surgery she was ambulating with a cane.  She now mostly has stiffness in the morning.  Her walking is improving.  She still has some mild groin pain.  She denies night pain. The patient has reported a slight increase in groin pain with overuse and she becomes more active. She has pain with ADLs as well.   Left hip exam: Neurovascularly intact. Sensation intact. Operative incision is well healed. She has slight decreased range of motion in her left leg is slightly shorter than the right by approximately quarter of an inch.  She has tenderness over the greater trochanter.  There is no obvious weakness of hip flexion or abduction. 4+/5 abductor strength. + SLR with slight pain.  X-rays done in the office today of the left hip 2 views and AP pelvis 1 view shows a healed intertrochanteric hip fracture with hardware in good position.  There does not appear to be any secondary arthritis or prominence of the hardware.  There are no obvious tumors, mass or calcifications seen.  There is no evidence of avascular necrosis.  The right hip shows mild degenerative changes.  The plan at this time is for physical therapy for the left hip.  Will consider an MRI to rule out AVN.  Will also consider an intra-articular injection under fluoroscopy if necessary.  I will see her back in the office as needed.

## 2024-08-22 ENCOUNTER — RESULT REVIEW (OUTPATIENT)
Age: 89
End: 2024-08-22

## 2024-08-28 ENCOUNTER — NON-APPOINTMENT (OUTPATIENT)
Age: 89
End: 2024-08-28

## 2024-09-10 NOTE — PATIENT PROFILE ADULT - LOCATION #1
[FreeTextEntry1] :  Pt is a 62 year female s/p vulvar reconstruction with gluteal fold v to y advancement flap DOS 5/6/24. Patient accompanied by family member. Pt doing well. She is doing wound care with [facility]. Family member reports wound to be healing well. No further complaints at this time. Denies fever, chills, pain, rash. sacrum intergluteal cleft

## 2024-10-29 NOTE — DISCHARGE NOTE NURSING/CASE MANAGEMENT/SOCIAL WORK - NSDCPETBCESMAN_GEN_ALL_CORE
Detail Level: Detailed Quality 431: Preventive Care And Screening: Unhealthy Alcohol Use - Screening: Patient not identified as an unhealthy alcohol user when screened for unhealthy alcohol use using a systematic screening method Quality 226: Preventive Care And Screening: Tobacco Use: Screening And Cessation Intervention: Patient screened for tobacco use and is an ex/non-smoker If you are a smoker, it is important for your health to stop smoking. Please be aware that second hand smoke is also harmful. Quality Measures: Hospice Services Provided To Patient Any Time During The Measurement Period.: Hospice services provided to patient any time during the measurement period.

## 2024-12-13 NOTE — DIETITIAN INITIAL EVALUATION ADULT - REASON FOR ADMISSION
Anesthesia Pre Eval Note    Anesthesia ROS/Med Hx        Anesthetic Complication History:    Patient does not have a history of anesthetic complications      Pulmonary Review:    Positive for asthma, well controlled    Neuro/Psych Review:  Patient does not have a neuro/psych history         Cardiovascular Review:   Exercise tolerance: good (>4 METS)  Positive for dysrhythmias (palpitations)    GI/HEPATIC/RENAL Review:     Positive for GERD    End/Other Review:    Positive for hypothyroidism  Additional Results:      ALLERGIES:  No Known Allergies   Past Medical History:  04/05/2010: Abnormal Pap smear of cervix      Comment:  ascus, neg hpv  12/03/2024: Age-related osteoporosis without current pathological   fracture  No date: Arthritis, rheumatoid (CMD)  No date: Asthma (CMD)      Comment:  No issues with asthma now  No date: Chronic pain  02/06/2020: Fatigue  No date: Gallstones  No date: Gastroesophageal reflux disease  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  02/06/2020: Hypothyroidism  03/02/2021: Lateral epicondylitis of right elbow  02/06/2020: Palpitations  03/02/2021: Right elbow pain  No date: Seasonal allergies  02/06/2020: SOB (shortness of breath)  No date: Thyroid disease  Past Surgical History:  No date: Cholecystectomy      Comment:  taken out during [place]  No date: Colposcopy,loop electrd cervix excis  No date: Cyst removal  No date: Jejunostomy  No date: Lasik  2012: Leep  No date: Oral surgery procedure  No date: Removal gallbladder  2010: Tubal ligation   Prior to Admission medications :  Medication levothyroxine 88 MCG tablet, Sig TAKE ONE TABLET BY MOUTH DAILY, Start Date 12/12/24, End Date , Taking? Yes, Authorizing Provider Edwina Olivia MD    Medication cholecalciferol (VITAMIN D) 25 mcg(1,000 units) tablet, Sig Take 250 mcg by mouth daily., Start Date , End Date , Taking? , Authorizing Provider Provider, Outside    Medication vitamin B-12 (CYANOCOBALAMIN) 1000 MCG tablet, Sig Take 1,000  mcg by mouth daily., Start Date , End Date , Taking? , Authorizing Provider Provider, Outside    Medication meloxicam (Mobic) 15 MG tablet, Sig Take 1 tablet by mouth daily., Start Date 10/11/24, End Date , Taking? , Authorizing Provider Corine oM MD    Medication pantoprazole (Protonix) 40 MG tablet, Sig Take 1 tablet by mouth daily., Start Date 4/29/24, End Date , Taking? , Authorizing Provider Ewdina Olivia MD    Medication ursodiol (ACTIGALL) 300 MG capsule, Sig Take 1 capsule (300 mg total) by mouth 3 (three) times a day., Start Date , End Date , Taking? , Authorizing Provider Provider, Outside         Patient Vitals for the past 24 hrs:   BP Temp Pulse Resp SpO2 Height Weight   12/13/24 1304 (!) 143/81 37 °C (98.6 °F) 83 18 96 % 5' 1\" (1.549 m) 59 kg (130 lb 1.1 oz)       Social history reviewed:  Social History     Tobacco Use   Smoking Status Former    Current packs/day: 0.00    Types: Cigarettes   Smokeless Tobacco Current   Tobacco Comments    Vapes daily        E-Cigarette/Vaping Substances & Devices    E-Cigarette/Vaping Use Current Every Day User     Nicotine Yes        Social History     Substance and Sexual Activity   Alcohol Use Yes    Alcohol/week: 1.0 standard drink of alcohol    Types: 1 Glasses of wine per week           Relevant Problems   No relevant active problems       Physical Exam     Airway   Mallampati: II  TM Distance: >3 FB  Neck ROM: Full  Neck: Non-tender and Able to place in sniff position  TMJ Mobility: Good    Cardiovascular  Cardiovascular exam normal  Cardio Rhythm: Regular  Cardio Rate: Normal    Head Assessment  Head assessment: Normocephalic and Atraumatic    General Assessment  General Assessment: Alert and oriented and No acute distress    Dental Exam  Dental exam normal    Pulmonary Exam  Pulmonary exam normal  Breath sounds clear to auscultation:  Yes    Abdominal Exam  Abdominal exam normal      Anesthesia Plan:    ASA Status: 2  Anesthesia Type:  MAC    Induction: Intravenous  Maintenance: TIVA    Post-op Pain Management: Per Surgeon      Checklist  Reviewed: NPO Status, Allergies, Medications, Problem list, Past Med History and Patient Summary  Consent/Risks Discussed Statement:  The proposed anesthetic plan, including its risks and benefits, have been discussed with the Patient along with the risks and benefits of alternatives. Questions were encouraged and answered and the patient and/or representative understands and agrees to proceed.        I discussed with the patient (and/or patient's legal representative) the risks and benefits of the proposed anesthesia plan, MAC, which may include services performed by other anesthesia providers.    Alternative anesthesia plans, if available, were reviewed with the patient (and/or patient's legal representative). Discussion has been held with the patient (and/or patient's legal representative) regarding risks of anesthesia, which include Intra-operative Awareness and emergent situations that may require change in anesthesia plan.    The patient (and/or patient's legal representative) has indicated understanding, his/her questions have been answered, and he/she wishes to proceed with the planned anesthetic.    Blood Products: Not Anticipated     Nondisplaced intertrochanteric fracture of unspecified femur, initial encounter for closed fracture

## 2024-12-28 NOTE — ED ADULT NURSE NOTE - NS ED PATIENT SAFETY CONCERN
Problem: At Risk for Falls  Goal: Patient does not fall  Outcome: Adequate for discharge  Goal: Patient takes action to control fall-related risks  Outcome: Adequate for discharge     Problem: Breathing Pattern Ineffective  Goal: Air exchange is effective, demonstrated by Sp02 sat of greater then or = 92% (or as ordered)  Outcome: Adequate for discharge  Goal: Respiratory pattern is quiet and regular without report of SOB  Outcome: Adequate for discharge  Goal: Breathing pattern demonstrates minimal apnea during sleep with appropriate use of airway pressure support devices  Outcome: Adequate for discharge  Goal: Verbalizes/demonstrates effective breathing management strategies  Description: Document education using the patient education activity.   Outcome: Adequate for discharge  Goal: Minimize respiratory effort related to dyspnea/shortness of breath (Hospice)  Outcome: Adequate for discharge     Problem: Pain  Goal: Acceptable pain level achieved/maintained at rest using appropriate pain scale for the patient  Outcome: Adequate for discharge  Goal: Acceptable pain level achieved/maintained with activity using appropriate pain scale for the patient  Outcome: Adequate for discharge  Goal: Acceptable pain level achieved/maintained without oversedation  Outcome: Adequate for discharge     Problem: At Risk for Injury Due to Fall  Goal: Patient does not fall  Outcome: Adequate for discharge  Goal: Takes action to control condition specific risks  Outcome: Adequate for discharge  Goal: Verbalizes understanding of fall-related injury personal risks  Description: Document education using the patient education activity  Outcome: Adequate for discharge      No

## 2025-04-01 NOTE — ED STATDOCS - CPE ED RESP NORM
[de-identified] : 04/01/2025: ASIYA MELARA is here for evaluation of her right ankle pain. Pain started after no injury 11.15.24 she noticed the pain in the foot she felt aching pain and stiffness. Denies any prior injury or trauma. Pain is radiating from the lateral ankle and heel. WB in Shoes. Hx lymphedema and using compression stockings.  [FreeTextEntry1] : right ankle pain  normal...